# Patient Record
Sex: MALE | Race: WHITE | Employment: OTHER | URBAN - METROPOLITAN AREA
[De-identification: names, ages, dates, MRNs, and addresses within clinical notes are randomized per-mention and may not be internally consistent; named-entity substitution may affect disease eponyms.]

---

## 2017-07-13 DIAGNOSIS — I10 ESSENTIAL HYPERTENSION WITH GOAL BLOOD PRESSURE LESS THAN 140/90: ICD-10-CM

## 2017-07-13 DIAGNOSIS — I48.20 CHRONIC ATRIAL FIBRILLATION (HCC): ICD-10-CM

## 2017-07-17 RX ORDER — LISINOPRIL 5 MG/1
TABLET ORAL
Qty: 30 TAB | Refills: 10 | Status: SHIPPED | OUTPATIENT
Start: 2017-07-17 | End: 2017-11-09

## 2017-07-17 RX ORDER — NEBIVOLOL HYDROCHLORIDE 10 MG/1
TABLET ORAL
Qty: 30 TAB | Refills: 10 | Status: SHIPPED | OUTPATIENT
Start: 2017-07-17 | End: 2018-08-01 | Stop reason: SDUPTHER

## 2017-11-09 ENCOUNTER — OFFICE VISIT (OUTPATIENT)
Dept: FAMILY MEDICINE CLINIC | Age: 77
End: 2017-11-09

## 2017-11-09 VITALS
BODY MASS INDEX: 24 KG/M2 | DIASTOLIC BLOOD PRESSURE: 94 MMHG | SYSTOLIC BLOOD PRESSURE: 160 MMHG | HEART RATE: 54 BPM | WEIGHT: 187 LBS | OXYGEN SATURATION: 97 % | RESPIRATION RATE: 18 BRPM | TEMPERATURE: 97.9 F | HEIGHT: 74 IN

## 2017-11-09 DIAGNOSIS — Z13.39 SCREENING FOR ALCOHOLISM: ICD-10-CM

## 2017-11-09 DIAGNOSIS — R73.01 ELEVATED FASTING GLUCOSE: ICD-10-CM

## 2017-11-09 DIAGNOSIS — Z51.81 MEDICATION MONITORING ENCOUNTER: ICD-10-CM

## 2017-11-09 DIAGNOSIS — Z23 ENCOUNTER FOR IMMUNIZATION: ICD-10-CM

## 2017-11-09 DIAGNOSIS — Z00.00 WELCOME TO MEDICARE PREVENTIVE VISIT: Primary | ICD-10-CM

## 2017-11-09 DIAGNOSIS — I10 ESSENTIAL HYPERTENSION: ICD-10-CM

## 2017-11-09 DIAGNOSIS — I48.20 CHRONIC ATRIAL FIBRILLATION (HCC): ICD-10-CM

## 2017-11-09 RX ORDER — ASPIRIN 81 MG/1
81 TABLET ORAL DAILY
Status: ON HOLD | COMMUNITY
Start: 2017-11-09 | End: 2018-10-01 | Stop reason: CLARIF

## 2017-11-09 NOTE — MR AVS SNAPSHOT
Visit Information Date & Time Provider Department Dept. Phone Encounter #  
 11/9/2017  8:00 AM Ana Maria Prudence, Thee Haines [phone] [id number] Follow-up Instructions Return in about 6 months (around 5/9/2018), or if symptoms worsen or fail to improve. Upcoming Health Maintenance Date Due  
 GLAUCOMA SCREENING Q2Y 3/30/2004 Pneumococcal 65+ Low/Medium Risk (2 of 2 - PPSV23) 7/13/2017 MEDICARE YEARLY EXAM 7/14/2017 Influenza Age 5 to Adult 8/1/2017 DTaP/Tdap/Td series (2 - Td) 11/9/2027 Allergies as of 11/9/2017  Review Complete On: 11/9/2017 By: Ana Maria Foss, DO No Known Allergies Current Immunizations  Reviewed on 7/13/2016 Name Date Influenza Vaccine (Quad) PF  Incomplete Pneumococcal Conjugate (PCV-13) 7/13/2016 Pneumococcal Polysaccharide (PPSV-23)  Incomplete Zoster Vaccine, Live 10/1/2015 Not reviewed this visit You Were Diagnosed With   
  
 Codes Comments Welcome to Medicare preventive visit    -  Primary ICD-10-CM: Z00.00 ICD-9-CM: V70.0 Screening for alcoholism     ICD-10-CM: Z13.89 ICD-9-CM: V79.1 Encounter for immunization     ICD-10-CM: X30 ICD-9-CM: V03.89 Essential hypertension     ICD-10-CM: I10 
ICD-9-CM: 401.9 Medication monitoring encounter     ICD-10-CM: Z51.81 
ICD-9-CM: V58.83 Elevated fasting glucose     ICD-10-CM: R73.01 
ICD-9-CM: 790.21 Chronic atrial fibrillation (HCC)     ICD-10-CM: H85.6 ICD-9-CM: 427.31 Vitals BP Pulse Temp Resp Height(growth percentile) Weight(growth percentile) (!) 160/94 (!) 54 97.9 °F (36.6 °C) (Oral) 18 6' 2\" (1.88 m) 187 lb (84.8 kg) SpO2 BMI Smoking Status 97% 24.01 kg/m2 Never Smoker Vitals History BMI and BSA Data Body Mass Index Body Surface Area 24.01 kg/m 2 2.1 m 2 Preferred Pharmacy Pharmacy Name Phone  [facility] [address] [phone] Your Updated Medication List  
  
   
This list is accurate as of: 11/9/17  8:28 AM.  Always use your most recent med list.  
  
  
  
  
 aspirin delayed-release 81 mg tablet Take 1 Tab by mouth daily. BYSTOLIC 10 mg tablet Generic drug:  nebivolol TAKE ONE TABLET BY MOUTH DAILY COMPLETE SENIOR 0.4-300-250 mg-mcg-mcg Tab Generic drug:  multivit-min-FA-lycopen-lutein Take 1 Tab by mouth daily. We Performed the Following CBC WITH AUTOMATED DIFF [17449 CPT(R)] HEMOGLOBIN A1C WITH EAG [01023 CPT(R)] INFLUENZA VIRUS VAC QUAD,SPLIT,PRESV FREE SYRINGE IM [id number] CPT(R)] METABOLIC PANEL, COMPREHENSIVE [82685 CPT(R)] PNEUMOCOCCAL POLYSACCHARIDE VACCINE, 23-VALENT, ADULT OR IMMUNOSUPPRESSED PT DOSE, [37359 CPT(R)] [place] ANNUAL ALCOHOL SCREEN 15 MIN [id number] [facility]] [place] IMMUNIZ ADMIN,1 SINGLE/COMB VAC/TOXOID [id number] CPT(R)] TSH 3RD GENERATION [83298 CPT(R)] Follow-up Instructions Return in about 6 months (around 5/9/2018), or if symptoms worsen or fail to improve. Patient Instructions Medicare Wellness Visit, Male The best way to live healthy is to have a healthy lifestyle by eating a well-balanced diet, exercising regularly, limiting alcohol and stopping smoking. Regular physical exams and screening tests are another way to keep healthy. Preventive exams provided by your health care provider can find health problems before they become diseases or illnesses. Preventive services including immunizations, screening tests, monitoring and exams can help you take care of your own health. All people over age 72 should have a pneumovax  and and a prevnar shot to prevent pneumonia. These are once in a lifetime unless you and your provider decide differently. All people over 65 should have a yearly flu shot and a tetanus vaccine every 10 years.  
 
Screening for diabetes mellitus with a blood sugar test should be done every year. Glaucoma is a disease of the eye due to increased ocular pressure that can lead to blindness and it should be done every year by an eye professional. 
 
Cardiovascular screening tests that check for elevated lipids (fatty part of blood) which can lead to heart disease and strokes should be done every 5 years. Colorectal screening that evaluates for blood or polyps in your colon should be done yearly as a stool test or every five years as a flexible sigmoidoscope or every 10 years as a colonoscopy up to age 76. Men up to age 76 may need a screening blood test for prostate cancer at certain intervals, depending on their personal and family history. This decision is between the patient and his provider. If you have been a smoker or had family history of abdominal aortic aneurysms, you and your provider may decide to schedule an ultrasound test of your aorta. Hepatitis C screening is also recommended for anyone born between [birth year] through [address]. A shingles vaccine is also recommended once in a lifetime after age 61. Your Medicare Wellness Exam is recommended annually. Here is a list of your current Health Maintenance items with a due date: 
Health Maintenance Due Topic Date Due  Glaucoma Screening   03/30/2004  Pneumococcal Vaccine (2 of 2 - PPSV23) 07/13/2017 [address] [place] [address] Annual Well Visit  07/14/2017  Flu Vaccine  08/01/2017 Introducing [facility]! Jay Jay Zarco introduces [employer] patient portal. Now you can access parts of your medical record, email your doctor's office, and request medication refills online. 1. In your internet browser, go to [email or web address] 2. Click on the First Time User? Click Here link in the Sign In box. You will see the New Member Sign Up page. 3. Enter your [employer] Access Code exactly as it appears below. You will not need to use this code after youve completed the sign-up process.  If you do not sign up before the expiration date, you must request a new code. · [employer] Access Code: [email or web address] Expires: 2/7/2018  8:06 AM 
 
4. Enter the last four digits of your Social Security Number (xxxx) and Date of Birth (mm/dd/yyyy) as indicated and click Submit. You will be taken to the next sign-up page. 5. Create a [employer] ID. This will be your [employer] login ID and cannot be changed, so think of one that is secure and easy to remember. 6. Create a [employer] password. You can change your password at any time. 7. Enter your Password Reset Question and Answer. This can be used at a later time if you forget your password. 8. Enter your e-mail address. You will receive e-mail notification when new information is available in [address] 9. Click Sign Up. You can now view and download portions of your medical record. 10. Click the Download Summary menu link to download a portable copy of your medical information. If you have questions, please visit the Frequently Asked Questions section of the [employer] website. Remember, [employer] is NOT to be used for urgent needs. For medical emergencies, dial 911. Now available from your iPhone and Android! Please provide this summary of care documentation to your next provider. Your primary care clinician is listed as Adilene Thacker. If you have any questions after today's visit, please call [phone].

## 2017-11-09 NOTE — PROGRESS NOTES
Lorna Henderson is a 65 y.o. male   Chief Complaint   Patient presents with    Complete Physical    Pt here for BP check and his medicare wellness visit. Pt states he stopped the lisinopril felt like it was making him weak. Does continue to take the bystolic. [facility]. Pt is otherwise feeling well and no other complaints. [facility]. he is a 65y.o. year old male who presents for evalution. Reviewed PmHx, RxHx, FmHx, SocHx, AllgHx and updated and dated in the chart. Review of Systems - negative except as listed above in the HPI    Objective:     Vitals:    11/09/17 0807   BP: (!) 160/94   Pulse: (!) 54   Resp: 18   Temp: 97.9 °F (36.6 °C)   TempSrc: Oral   SpO2: 97%   Weight: 187 lb (84.8 kg)   Height: 6' 2\" (1.88 m)       Current Outpatient Prescriptions   Medication Sig    aspirin delayed-release 81 mg tablet Take 1 Tab by mouth daily.  BYSTOLIC 10 mg tablet TAKE ONE TABLET BY MOUTH DAILY    multivit-min-FA-lycopen-lutein (COMPLETE SENIOR) 0.4-300-250 mg-mcg-mcg tab Take 1 Tab by mouth daily. No current facility-administered medications for this visit. Physical Examination: General appearance - alert, well appearing, and in no distress  Mental status - alert, oriented to person, place, and time  Chest - clear to auscultation, no wheezes, rales or rhonchi, symmetric air entry  Heart - normal rate, regular rhythm, normal S1, S2, no murmurs, rubs, clicks or gallops, pt pulse feels to be in regular rhythm  Extremities - peripheral pulses normal, no pedal edema, no clubbing or cyanosis  Skin - normal coloration and turgor, no rashes, no suspicious skin lesions noted      Assessment/ Plan:   Diagnoses and all orders for this visit:    1. Welcome to Medicare preventive visit  -     Annual  Alcohol Screen 15 min ()    2. Screening for alcoholism  -     Annual  Alcohol Screen 15 min ()    3. Encounter for immunization  -     Pneumococcal polysaccharide vaccine, 23-valent, adult or immunosuppressed pt dose  -     [place] IMMUNIZ ADMIN,1 SINGLE/COMB VAC/TOXOID  -     Influenza virus vaccine (FLUZONE HIGH-DOSE) 65 years and older    4. Essential hypertension  -     METABOLIC PANEL, COMPREHENSIVE  -     TSH 3RD GENERATION    5. Medication monitoring encounter  -     CBC WITH AUTOMATED DIFF  -     METABOLIC PANEL, COMPREHENSIVE  -     TSH 3RD GENERATION    6. Elevated fasting glucose  -     METABOLIC PANEL, COMPREHENSIVE  -     HEMOGLOBIN A1C WITH EAG    7. Chronic atrial fibrillation (HCC)       Follow-up Disposition:  Return in about 6 months (around 5/9/2018), or if symptoms worsen or fail to improve. I have discussed the diagnosis with the patient and the intended plan as seen in the above orders. The patient has received an after-visit summary and questions were answered concerning future plans. Pt conveyed understanding of plan. Medication Side Effects and Warnings were discussed with patient      Toshia Jose, DO       This is a Subsequent Medicare Annual Wellness Exam (AWV) (Performed 12 months after IPPE or effective date of Medicare Part B enrollment)    I have reviewed the patient's medical history in detail and updated the computerized patient record. History     Past Medical History:   Diagnosis Date    A-fib [facility])     discovered 2008    Hypertension     Pacemaker     placed  Nov 2011 ([address])    Sick sinus syndrome [facility])     heart monitor showed long pauses (> 4 sec) in 2011      Past Surgical History:   Procedure Laterality Date    HX HERNIA REPAIR      HX PACEMAKER       Current Outpatient Prescriptions   Medication Sig Dispense Refill    aspirin delayed-release 81 mg tablet Take 1 Tab by mouth daily.       BYSTOLIC 10 mg tablet TAKE ONE TABLET BY MOUTH DAILY 30 Tab 10    multivit-min-FA-lycopen-lutein (COMPLETE SENIOR) 0.4-300-250 mg-mcg-mcg tab Take 1 Tab by mouth daily. No Known Allergies  Family History   Problem Relation Age of Onset    Diabetes Maternal Aunt      Social History   Substance Use Topics    Smoking status: Never Smoker    Smokeless tobacco: Never Used    Alcohol use No     Patient Active Problem List   Diagnosis Code    Essential hypertension I10    Chronic atrial fibrillation (HCC) I48.2    Pacemaker Z95.0    ACP (advance care planning) Z71.89       Depression Risk Factor Screening:     PHQ over the last two weeks 11/9/2017   Little interest or pleasure in doing things Not at all   Feeling down, depressed or hopeless Not at all   Total Score PHQ 2 0     Alcohol Risk Factor Screening: You do not drink alcohol or very rarely. Functional Ability and Level of Safety:   Hearing Loss  Hearing is good. Activities of Daily Living  The home contains: no safety equipment. Patient does total self care    Fall Risk  Fall Risk Assessment, last 12 mths 11/9/2017   Able to walk? Yes   Fall in past 12 months? No       Abuse Screen  Patient is not abused    Cognitive Screening   Evaluation of Cognitive Function:  Has your family/caregiver stated any concerns about your memory: no  Normal    Patient Care Team   Patient Care Team:  Hair Fields DO as PCP - General (Family Practice)  Xu Dennis MD (Cardiology)  Arnold Rees MD as Surgeon (Cardiothoracic Surgery)    Assessment/Plan   Education and counseling provided:  Are appropriate based on today's review and evaluation  End-of-Life planning (with patient's consent)  Pneumococcal Vaccine  Influenza Vaccine    Diagnoses and all orders for this visit:    1. Welcome to Medicare preventive visit  -     Annual  Alcohol Screen 15 min ()    2. Screening for alcoholism  -     Annual  Alcohol Screen 15 min ()    3.  Encounter for immunization  -     Pneumococcal polysaccharide vaccine, 23-valent, adult or immunosuppressed pt dose  -     [place] IMMUNIZ ADMIN,1 SINGLE/COMB VAC/TOXOID  -     Influenza virus vaccine (FLUZONE HIGH-DOSE) 72 years and older    4. Essential hypertension  -     METABOLIC PANEL, COMPREHENSIVE  -     TSH 3RD GENERATION    5. Medication monitoring encounter  -     CBC WITH AUTOMATED DIFF  -     METABOLIC PANEL, COMPREHENSIVE  -     TSH 3RD GENERATION    6. Elevated fasting glucose  -     METABOLIC PANEL, COMPREHENSIVE  -     HEMOGLOBIN A1C WITH EAG    7. Chronic atrial fibrillation [facility])        Health Maintenance Due   Topic Date Due    GLAUCOMA SCREENING Q2Y  03/30/2004    Pneumococcal 65+ Low/Medium Risk (2 of 2 - PPSV23) 07/13/2017    MEDICARE YEARLY EXAM  07/14/2017    Influenza Age 9 to Adult  08/01/2017     I have discussed the diagnosis with the patient and the intended plan as seen in the above orders. The patient has received an after-visit summary and questions were answered concerning future plans. Pt conveyed understanding of plan.       Dr Melanie Pérez

## 2017-11-09 NOTE — PATIENT INSTRUCTIONS

## 2017-11-10 LAB
ALBUMIN SERPL-MCNC: 4.5 G/DL (ref 3.5–4.8)
ALBUMIN/GLOB SERPL: 1.8 {RATIO} (ref 1.2–2.2)
ALP SERPL-CCNC: 85 IU/L (ref 39–117)
ALT SERPL-CCNC: 22 IU/L (ref 0–44)
AST SERPL-CCNC: 26 IU/L (ref 0–40)
BASOPHILS # BLD AUTO: 0 X10E3/UL (ref 0–0.2)
BASOPHILS NFR BLD AUTO: 0 %
BILIRUB SERPL-MCNC: 1.9 MG/DL (ref 0–1.2)
BUN SERPL-MCNC: 21 MG/DL (ref 8–27)
BUN/CREAT SERPL: 18 (ref 10–24)
CALCIUM SERPL-MCNC: 9.3 MG/DL (ref 8.6–10.2)
CHLORIDE SERPL-SCNC: 101 MMOL/L (ref 96–106)
CO2 SERPL-SCNC: 20 MMOL/L (ref 18–29)
CREAT SERPL-MCNC: 1.15 MG/DL (ref 0.76–1.27)
EOSINOPHIL # BLD AUTO: 0.2 X10E3/UL (ref 0–0.4)
EOSINOPHIL NFR BLD AUTO: 4 %
ERYTHROCYTE [DISTWIDTH] IN BLOOD BY AUTOMATED COUNT: 14.5 % (ref 12.3–15.4)
EST. AVERAGE GLUCOSE BLD GHB EST-MCNC: 126 MG/DL
GFR SERPLBLD CREATININE-BSD FMLA CKD-EPI: 61 ML/MIN/1.73
GFR SERPLBLD CREATININE-BSD FMLA CKD-EPI: 70 ML/MIN/1.73
GLOBULIN SER CALC-MCNC: 2.5 G/DL (ref 1.5–4.5)
GLUCOSE SERPL-MCNC: 119 MG/DL (ref 65–99)
HBA1C MFR BLD: 6 % (ref 4.8–5.6)
HCT VFR BLD AUTO: 44.5 % (ref 37.5–51)
HGB BLD-MCNC: 15.1 G/DL (ref 12.6–17.7)
IMM GRANULOCYTES # BLD: 0 X10E3/UL (ref 0–0.1)
IMM GRANULOCYTES NFR BLD: 0 %
LYMPHOCYTES # BLD AUTO: 1.7 X10E3/UL (ref 0.7–3.1)
LYMPHOCYTES NFR BLD AUTO: 31 %
MCH RBC QN AUTO: 31.6 PG (ref 26.6–33)
MCHC RBC AUTO-ENTMCNC: 33.9 G/DL (ref 31.5–35.7)
MCV RBC AUTO: 93 FL (ref 79–97)
MONOCYTES # BLD AUTO: 0.5 X10E3/UL (ref 0.1–0.9)
MONOCYTES NFR BLD AUTO: 9 %
NEUTROPHILS # BLD AUTO: 3 X10E3/UL (ref 1.4–7)
NEUTROPHILS NFR BLD AUTO: 56 %
PLATELET # BLD AUTO: 174 X10E3/UL (ref 150–379)
POTASSIUM SERPL-SCNC: 4.5 MMOL/L (ref 3.5–5.2)
PROT SERPL-MCNC: 7 G/DL (ref 6–8.5)
RBC # BLD AUTO: 4.78 X10E6/UL (ref 4.14–5.8)
SODIUM SERPL-SCNC: 140 MMOL/L (ref 134–144)
TSH SERPL DL<=0.005 MIU/L-ACNC: 1.85 UIU/ML (ref 0.45–4.5)
WBC # BLD AUTO: 5.3 X10E3/UL (ref 3.4–10.8)

## 2018-04-27 ENCOUNTER — DOCUMENTATION ONLY (OUTPATIENT)
Dept: CARDIOLOGY CLINIC | Age: 78
End: 2018-04-27

## 2018-04-27 NOTE — PROGRESS NOTES
Mailed pt a letter to inform him he is overdue for his pacer check and appt to see Dr Kalpana Dockery. Informed to ask for Candiss Sol to bere both appts together.

## 2018-05-10 ENCOUNTER — TELEPHONE (OUTPATIENT)
Dept: CARDIOLOGY CLINIC | Age: 78
End: 2018-05-10

## 2018-06-29 ENCOUNTER — OFFICE VISIT (OUTPATIENT)
Dept: CARDIOLOGY CLINIC | Age: 78
End: 2018-06-29

## 2018-06-29 ENCOUNTER — CLINICAL SUPPORT (OUTPATIENT)
Dept: CARDIOLOGY CLINIC | Age: 78
End: 2018-06-29

## 2018-06-29 VITALS
OXYGEN SATURATION: 99 % | HEIGHT: 74 IN | SYSTOLIC BLOOD PRESSURE: 140 MMHG | WEIGHT: 185.6 LBS | HEART RATE: 59 BPM | DIASTOLIC BLOOD PRESSURE: 98 MMHG | RESPIRATION RATE: 14 BRPM | BODY MASS INDEX: 23.82 KG/M2

## 2018-06-29 DIAGNOSIS — I48.20 CHRONIC ATRIAL FIBRILLATION (HCC): Primary | ICD-10-CM

## 2018-06-29 DIAGNOSIS — Z95.0 CARDIAC PACEMAKER IN SITU: Primary | ICD-10-CM

## 2018-06-29 NOTE — PROGRESS NOTES
HISTORY OF PRESENTING ILLNESS      Ruth Ann Saucedo is a 77 y.o. 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 Device interrogation reveals normal functioning. Denies chest pain, SOB, syncope, edema. ACTIVE PROBLEM LIST     Patient Active Problem List    Diagnosis Date Noted    ACP (advance care planning) 07/13/2016    Pacemaker 11/02/2015    Essential hypertension 07/20/2015    Chronic atrial fibrillation ([address] 07/20/2015           PAST MEDICAL HISTORY     Past Medical History:   Diagnosis Date    A-fib [facility])     discovered 2008    Hypertension     Pacemaker     placed  Nov 2011 ([address])    Sick sinus syndrome ([address]     heart monitor showed long pauses (> 4 sec) in 2011           PAST SURGICAL HISTORY     Past Surgical History:   Procedure Laterality Date    HX HERNIA REPAIR      HX PACEMAKER            ALLERGIES     No Known Allergies       FAMILY HISTORY     Family History   Problem Relation Age of Onset    Diabetes Maternal Aunt     negative for cardiac disease       SOCIAL HISTORY     Social History     Social History    Marital status:      Spouse name: N/A    Number of children: N/A    Years of education: N/A     Social History Main Topics    Smoking status: Never Smoker    Smokeless tobacco: Never Used    Alcohol use No    Drug use: No    Sexual activity: Not Asked     Other Topics Concern    None     Social History Narrative         MEDICATIONS     Current Outpatient Prescriptions   Medication Sig    aspirin delayed-release 81 mg tablet Take 1 Tab by mouth daily.  BYSTOLIC 10 mg tablet TAKE ONE TABLET BY MOUTH DAILY    multivit-min-FA-lycopen-lutein (COMPLETE SENIOR) 0.4-300-250 mg-mcg-mcg tab Take 1 Tab by mouth daily. No current facility-administered medications for this visit.         I have reviewed the nurses notes, vitals, problem list, allergy list, medical history, family, social history and medications. REVIEW OF SYMPTOMS      General: Pt denies excessive weight gain or loss. Pt is able to conduct ADL's  HEENT: Denies blurred vision, headaches, hearing loss, epistaxis and difficulty swallowing. Respiratory: Denies cough, congestion, shortness of breath, LOMELI, wheezing or stridor. Cardiovascular: Denies precordial pain, palpitations, edema or PND  Gastrointestinal: Denies poor appetite, indigestion, abdominal pain or blood in stool  Genitourinary: Denies hematuria, dysuria, increased urinary frequency  Musculoskeletal: Denies joint pain or swelling from muscles or joints  Neurologic: Denies tremor, paresthesias, headache, or sensory motor disturbance  Psychiatric: Denies confusion, insomnia, depression  Integumentray: Denies rash, itching or ulcers. Hematologic: Denies easy bruising, bleeding       PHYSICAL EXAMINATION      Vitals:    06/29/18 0924   BP: (!) 140/98   Pulse: (!) 59   Resp: 14   SpO2: 99%   Weight: 185 lb 9.6 oz (84.2 kg)   Height: 6' 2\" (1.88 m)     General: Well developed, in no acute distress. HEENT: No jaundice, oral mucosa moist, no oral ulcers  Neck: Supple, no stiffness, no lymphadenopathy, supple  Heart:  Normal S1/S2 negative S3 or S4. Regular, no murmur, gallop or rub, no jugular venous distention  Respiratory: Clear bilaterally x 4, no wheezing or rales  Abdomen:   Soft, non-tender, bowel sounds are active.   Extremities:  No edema, normal cap refill, no cyanosis. Musculoskeletal: No clubbing, no deformities  Neuro: A&Ox3, speech clear, gait stable, cooperative, no focal neurologic deficits  Skin: Skin color is normal. No rashes or lesions.  Non diaphoretic, moist.  Vascular: 2+ pulses symmetric in all extremities       DIAGNOSTIC DATA      EKG:        LABORATORY DATA      Lab Results   Component Value Date/Time    WBC 5.3 11/09/2017 08:24 AM    HGB 15.1 11/09/2017 08:24 AM    HCT 44.5 11/09/2017 08:24 AM PLATELET 295 56/10/7153 08:24 AM    MCV 93 11/09/2017 08:24 AM      Lab Results   Component Value Date/Time    Sodium 140 11/09/2017 08:24 AM    Potassium 4.5 11/09/2017 08:24 AM    Chloride 101 11/09/2017 08:24 AM    CO2 20 11/09/2017 08:24 AM    Glucose 119 (H) 11/09/2017 08:24 AM    BUN 21 11/09/2017 08:24 AM    Creatinine 1.15 11/09/2017 08:24 AM    BUN/Creatinine ratio 18 11/09/2017 08:24 AM    GFR est AA 70 11/09/2017 08:24 AM    GFR est non-AA 61 11/09/2017 08:24 AM    Calcium 9.3 11/09/2017 08:24 AM    Bilirubin, total 1.9 (H) 11/09/2017 08:24 AM    AST (SGOT) 26 11/09/2017 08:24 AM    Alk. phosphatase 85 11/09/2017 08:24 AM    Protein, total 7.0 11/09/2017 08:24 AM    Albumin 4.5 11/09/2017 08:24 AM    A-G Ratio 1.8 11/09/2017 08:24 AM    ALT (SGPT) 22 11/09/2017 08:24 AM           ASSESSMENT      1. Hypertension  2. Atrial fibrillation              A. Long-standing persistent              B. Declined anticoagulation  3. PPM                A. Single chamber              B. [place] [facility]  4. Lead malfunction              A. Insulation breath       PLAN     He wishes to defer WATCHMAN. Continue follow up in device clinic       FOLLOW-UP     1 year      Thank you, Denzel Epperson DO for allowing me to participate in the care of this extraordinarily pleasant male. Please do not hesitate to contact me for further questions/concerns.          Ignacio Zaragoza MD  Cardiac Electrophysiology / Cardiology    [address]  [address] [facility], [facility], [address] [place], [place] [address], [address]    [address]  [phone] / [phone] Fax   [phone] / [phone] Fax

## 2018-06-29 NOTE — PROGRESS NOTES
Chief Complaint   Patient presents with    Follow-up    Pacemaker Check    Irregular Heart Beat     1. Have you been to the ER, urgent care clinic since your last visit? Hospitalized since your last visit? No    2. Have you seen or consulted any other health care providers outside of the [address] [place] [address] since your last visit? Include any pap smears or colon screening.  No    Visit Vitals    BP (!) 140/98 (BP 1 Location: Left arm, BP Patient Position: Sitting)    Pulse (!) 59    Resp 14    Ht 6' 2\" (1.88 m)    Wt 185 lb 9.6 oz (84.2 kg)    SpO2 99%    BMI 23.83 kg/m2

## 2018-07-05 ENCOUNTER — TELEPHONE (OUTPATIENT)
Dept: CARDIOLOGY CLINIC | Age: 78
End: 2018-07-05

## 2018-07-05 NOTE — TELEPHONE ENCOUNTER
Patient has decided to go ahead with the procedure that he & Dr Kemal Stewart discussed. Patient would like a call back to set up.   Phone [phone]  Leyla Boyd

## 2018-07-09 NOTE — TELEPHONE ENCOUNTER
Spoke with patient and spouse. SUSHMA for this Wednesday, July 11th 9:30am arrival.  Reviewed preop over phone with spouse as patient was not available.

## 2018-07-11 ENCOUNTER — HOSPITAL ENCOUNTER (OUTPATIENT)
Dept: NON INVASIVE DIAGNOSTICS | Age: 78
Discharge: HOME | End: 2018-07-11
Attending: INTERNAL MEDICINE | Admitting: INTERNAL MEDICINE
Payer: MEDICARE

## 2018-07-11 VITALS
SYSTOLIC BLOOD PRESSURE: 136 MMHG | TEMPERATURE: 97.8 F | WEIGHT: 182.98 LBS | HEART RATE: 60 BPM | HEIGHT: 74 IN | BODY MASS INDEX: 23.48 KG/M2 | DIASTOLIC BLOOD PRESSURE: 74 MMHG | RESPIRATION RATE: 20 BRPM | OXYGEN SATURATION: 95 %

## 2018-07-11 PROCEDURE — 99152 MOD SED SAME PHYS/QHP 5/>YRS: CPT | Performed by: INTERNAL MEDICINE

## 2018-07-11 PROCEDURE — 74011250636 HC RX REV CODE- 250/636: Performed by: INTERNAL MEDICINE

## 2018-07-11 PROCEDURE — 74011000250 HC RX REV CODE- 250: Performed by: INTERNAL MEDICINE

## 2018-07-11 PROCEDURE — 93312 ECHO TRANSESOPHAGEAL: CPT

## 2018-07-11 RX ORDER — FENTANYL CITRATE 50 UG/ML
25-200 INJECTION, SOLUTION INTRAMUSCULAR; INTRAVENOUS
Status: DISCONTINUED | OUTPATIENT
Start: 2018-07-11 | End: 2018-07-11 | Stop reason: HOSPADM

## 2018-07-11 RX ORDER — SAW PALMETTO 160 MG
160 CAPSULE ORAL 3 TIMES DAILY
COMMUNITY

## 2018-07-11 RX ORDER — LIDOCAINE 50 MG/G
OINTMENT TOPICAL AS NEEDED
Status: DISCONTINUED | OUTPATIENT
Start: 2018-07-11 | End: 2018-07-11 | Stop reason: HOSPADM

## 2018-07-11 RX ORDER — LIDOCAINE HYDROCHLORIDE 20 MG/ML
JELLY TOPICAL ONCE
Status: COMPLETED | OUTPATIENT
Start: 2018-07-11 | End: 2018-07-11

## 2018-07-11 RX ORDER — LIDOCAINE HYDROCHLORIDE 20 MG/ML
15 SOLUTION OROPHARYNGEAL AS NEEDED
Status: DISCONTINUED | OUTPATIENT
Start: 2018-07-11 | End: 2018-07-11 | Stop reason: HOSPADM

## 2018-07-11 RX ORDER — DIPHENHYDRAMINE HYDROCHLORIDE 50 MG/ML
25-50 INJECTION, SOLUTION INTRAMUSCULAR; INTRAVENOUS ONCE
Status: COMPLETED | OUTPATIENT
Start: 2018-07-11 | End: 2018-07-11

## 2018-07-11 RX ORDER — MIDAZOLAM HYDROCHLORIDE 1 MG/ML
.5-1 INJECTION, SOLUTION INTRAMUSCULAR; INTRAVENOUS
Status: DISCONTINUED | OUTPATIENT
Start: 2018-07-11 | End: 2018-07-11 | Stop reason: HOSPADM

## 2018-07-11 RX ADMIN — FENTANYL CITRATE 25 MCG: 50 INJECTION, SOLUTION INTRAMUSCULAR; INTRAVENOUS at 12:22

## 2018-07-11 RX ADMIN — LIDOCAINE 1 EACH: 50 OINTMENT TOPICAL at 12:16

## 2018-07-11 RX ADMIN — LIDOCAINE HYDROCHLORIDE 3 ML: 20 JELLY TOPICAL at 12:15

## 2018-07-11 RX ADMIN — LIDOCAINE HYDROCHLORIDE 15 ML: 20 SOLUTION ORAL; TOPICAL at 12:16

## 2018-07-11 RX ADMIN — MIDAZOLAM HYDROCHLORIDE 1 MG: 1 INJECTION, SOLUTION INTRAMUSCULAR; INTRAVENOUS at 12:22

## 2018-07-11 RX ADMIN — DIPHENHYDRAMINE HYDROCHLORIDE 25 MG: 50 INJECTION, SOLUTION INTRAMUSCULAR; INTRAVENOUS at 12:15

## 2018-07-11 RX ADMIN — FENTANYL CITRATE 25 MCG: 50 INJECTION, SOLUTION INTRAMUSCULAR; INTRAVENOUS at 12:28

## 2018-07-11 NOTE — IP AVS SNAPSHOT
[address] [place] [address] 104 [address] [place] [phone] Patient: Diana Saldana MRN: [id number] HZN:3/[birth year]86 About your hospitalization You were admitted on:  July 11, 2018 You last received care in the:  [facility] ELECTROPHYSIOLOGY You were discharged on:  July 11, 2018 Why you were hospitalized Your primary diagnosis was:  Not on File Follow-up Information None Your Scheduled Appointments Thursday July 26, 2018  9:15 AM EDT  
EP 75 with CATH EP ROOM [facility]  
[facility] ELECTROPHYSIOLOGY ([address]) [facility]  
[phone] NPO AFTER MIDNIGHT! ROUTINE CASES:  Please arrive 2 hour prior to your scheduled appointment time. If your scheduled appointment is for 0730, 0800, 0815, please arrive by 0645. NON ROUTINE CASES:  PATIENTS WHO REQUIRE LABS, X-RAY, EKG, or MEDS:  PLEASE ARRIVE 3 HOURS PRIOR TO YOUR SCHEDULED APPOINTMENT. If you require hydration prior to your procedure, PLEASE ARRIVE 4 HOURS PRIOR TO YOUR APPOINTMENT  **** IT IS THE OFFICE SCHEDULARS RESPONSBILITY TO NOTIFY THE CATH LAB SCHEDULAR IF THE PATIENT WILL REQUIRE ANY ADDITIONAL TIME FOR PREP FROM ROUTINE CASE ***** Please report to [facility], 2nd Floor. Please report to the Main Registration area for [address] [place] [address]. Use the [address] [place] [address] entrance to the main hospital.  Enter [facility] and take gold elevators to the second floor. Sign in at [employer]. Discharge Orders None A check abida indicates which time of day the medication should be taken. My Medications ASK your doctor about these medications Instructions Each Dose to Equal  
 Morning Noon Evening Bedtime  
 aspirin delayed-release 81 mg tablet Your last dose was: Your next dose is: Take 1 Tab by mouth daily.   
 81 mg  
    
   
   
   
  
 BYSTOLIC 10 mg tablet Generic drug:  nebivolol Your last dose was: Your next dose is: TAKE ONE TABLET BY MOUTH DAILY COMPLETE SENIOR 0.4-300-250 mg-mcg-mcg Tab Generic drug:  multivit-min-FA-lycopen-lutein Your last dose was: Your next dose is: Take 1 Tab by mouth daily. 1 Tab  
    
   
   
   
  
 saw palmetto 160 mg Cap Your last dose was: Your next dose is: Take 160 mg by mouth three (3) times daily. 160 mg Discharge Instructions Chadd Gill YOU TRANSESOPHAGEAL ECHOCARDIOGRAM 
 
Be sure someone else drives you home. You may feel drowsy for several hours. Do not eat or drink for at least two hours after your procedure. Your throat will be numb and there is a risk you might have difficulty swallowing for a while. Be careful when you do eat or drink for the first time especially with hot fluids since you could easily burn your throat. Call your doctor if: 
 
· You are bleeding from your throat or mouth. · You have trouble breathing all of a sudden. · You have chest pain or any pain that spreads to your neck, jaw, or arms. · You have questions or concerns. · You have a fever greater than 101°F. 
 
Doctor: Candice Spine Special Instructions: 
 
No driving for 24 hours. *** Introducing Kenny Carrionmary anne As a Jeni Martínez patient, I wanted to make you aware of our electronic visit tool called Kenny Byrd. [facility] Mart[facility]nez 24/7 allows you to connect within minutes with a medical provider 24 hours a day, seven days a week via a mobile device or tablet or logging into a secure website from your computer. You can access Kenny Byrd from anywhere in the [place].  
 
A virtual visit might be right for you when you have a simple condition and feel like you just dont want to get out of bed, or cant get away from work for an appointment, when your regular [facility] provider is not available (evenings, weekends or holidays), or when youre out of town and need minor care. Electronic visits cost only $49 and if the [facility] 24/7 provider determines a prescription is needed to treat your condition, one can be electronically transmitted to a nearby pharmacy*. Please take a moment to enroll today if you have not already done so. The enrollment process is free and takes just a few minutes. To enroll, please download the [employer] sully to your tablet or phone, or visit [email or web address] org to enroll on your computer. And, as an [address] [place] [address] patient with a Leiyoo account, the results of your visits will be scanned into your electronic medical record and your primary care provider will be able to view the scanned results. We urge you to continue to see your regular [facility] provider for your ongoing medical care. And while your primary care provider may not be the one available when you seek a Passadomarnifin virtual visit, the peace of mind you get from getting a real diagnosis real time can be priceless. For more information on MatchMate.Me, view our Frequently Asked Questions (FAQs) at [email or web address]. Sincerely, 
 
Trena Venegas MD 
Chief Medical Officer Esthela Nalini Franklin *:  certain medications cannot be prescribed via MatchMate[employer]Me Providers Seen During Your Hospitalization Provider Specialty Primary office phone Nick Ivy MD Cardiology [phone] Your Primary Care Physician (PCP) Primary Care Physician Office Phone Office Fax Guera Scriver [phone] You are allergic to the following No active allergies Recent Documentation Height Weight BMI Smoking Status 1.88 m 83 kg 23.49 kg/m2 Never Smoker Emergency Contacts Name Discharge Info Relation Home Work Mobile Nata Bishop DISCHARGE CAREGIVER [3] Spouse [3] [phone] Patient Belongings The following personal items are in your possession at time of discharge: 
     Visual Aid: Glasses, At bedside Please provide this summary of care documentation to your next provider. Signatures-by signing, you are acknowledging that this After Visit Summary has been reviewed with you and you have received a copy. Patient Signature:  ____________________________________________________________ Date:  ____________________________________________________________  
  
[facility] Signature:  ____________________________________________________________ Date:  ____________________________________________________________

## 2018-07-11 NOTE — H&P
HISTORY OF PRESENTING ILLNESS       Zuhair Shipman is a 77 y.o. 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 Device interrogation reveals normal functioning. Denies chest pain, SOB, syncope, edema.          ACTIVE PROBLEM LIST           Patient Active Problem List     Diagnosis Date Noted    ACP (advance care planning) 07/13/2016    Pacemaker 11/02/2015    Essential hypertension 07/20/2015    Chronic atrial fibrillation ([address] 07/20/2015             PAST MEDICAL HISTORY           Past Medical History:   Diagnosis Date    A-fib [facility])       discovered 2008    Hypertension      Pacemaker       placed  Nov 2011 ([employer])    Sick sinus syndrome [facility])       heart monitor showed long pauses (> 4 sec) in 2011             PAST SURGICAL HISTORY            Past Surgical History:   Procedure Laterality Date    HX HERNIA REPAIR        HX PACEMAKER                 ALLERGIES      No Known Allergies         FAMILY HISTORY            Family History   Problem Relation Age of Onset    Diabetes Maternal Aunt      negative for cardiac disease         SOCIAL HISTORY       Social History                Social History    Marital status:        Spouse name: N/A    Number of children: N/A    Years of education: N/A           Social History Main Topics    Smoking status: Never Smoker    Smokeless tobacco: Never Used    Alcohol use No    Drug use: No    Sexual activity: Not Asked           Other Topics Concern    None      Social History Narrative               MEDICATIONS           Current Outpatient Prescriptions   Medication Sig    aspirin delayed-release 81 mg tablet Take 1 Tab by mouth daily.     BYSTOLIC 10 mg tablet TAKE ONE TABLET BY MOUTH DAILY    multivit-min-FA-lycopen-lutein (COMPLETE SENIOR) 0.4-300-250 mg-mcg-mcg tab Take 1 Tab by mouth daily.      No current facility-administered medications for this visit.          I have reviewed the nurses notes, vitals, problem list, allergy list, medical history, family, social history and medications.         REVIEW OF SYMPTOMS       General: Pt denies excessive weight gain or loss. Pt is able to conduct ADL's  HEENT: Denies blurred vision, headaches, hearing loss, epistaxis and difficulty swallowing. Respiratory: Denies cough, congestion, shortness of breath, LOMELI, wheezing or stridor. Cardiovascular: Denies precordial pain, palpitations, edema or PND  Gastrointestinal: Denies poor appetite, indigestion, abdominal pain or blood in stool  Genitourinary: Denies hematuria, dysuria, increased urinary frequency  Musculoskeletal: Denies joint pain or swelling from muscles or joints  Neurologic: Denies tremor, paresthesias, headache, or sensory motor disturbance  Psychiatric: Denies confusion, insomnia, depression  Integumentray: Denies rash, itching or ulcers. Hematologic: Denies easy bruising, bleeding         PHYSICAL EXAMINATION           Vitals:     06/29/18 0924   BP: (!) 140/98   Pulse: (!) 59   Resp: 14   SpO2: 99%   Weight: 185 lb 9.6 oz (84.2 kg)   Height: 6' 2\" (1.88 m)      General: Well developed, in no acute distress. HEENT: No jaundice, oral mucosa moist, no oral ulcers  Neck: Supple, no stiffness, no lymphadenopathy, supple  Heart:  Normal S1/S2 negative S3 or S4. Regular, no murmur, gallop or rub, no jugular venous distention  Respiratory: Clear bilaterally x 4, no wheezing or rales  Abdomen:   Soft, non-tender, bowel sounds are active.   Extremities:  No edema, normal cap refill, no cyanosis. Musculoskeletal: No clubbing, no deformities  Neuro: A&Ox3, speech clear, gait stable, cooperative, no focal neurologic deficits  Skin: Skin color is normal. No rashes or lesions.  Non diaphoretic, moist.  Vascular: 2+ pulses symmetric in all extremities         DIAGNOSTIC DATA       EKG:          LABORATORY DATA      Lab Results   Component Value Date/Time     WBC 5.3 11/09/2017 08:24 AM     HGB 15.1 11/09/2017 08:24 AM     HCT 44.5 11/09/2017 08:24 AM     PLATELET 764 14/05/6370 08:24 AM     MCV 93 11/09/2017 08:24 AM            Lab Results   Component Value Date/Time     Sodium 140 11/09/2017 08:24 AM     Potassium 4.5 11/09/2017 08:24 AM     Chloride 101 11/09/2017 08:24 AM     CO2 20 11/09/2017 08:24 AM     Glucose 119 (H) 11/09/2017 08:24 AM     BUN 21 11/09/2017 08:24 AM     Creatinine 1.15 11/09/2017 08:24 AM     BUN/Creatinine ratio 18 11/09/2017 08:24 AM     GFR est AA 70 11/09/2017 08:24 AM     GFR est non-AA 61 11/09/2017 08:24 AM     Calcium 9.3 11/09/2017 08:24 AM     Bilirubin, total 1.9 (H) 11/09/2017 08:24 AM     AST (SGOT) 26 11/09/2017 08:24 AM     Alk. phosphatase 85 11/09/2017 08:24 AM     Protein, total 7.0 11/09/2017 08:24 AM     Albumin 4.5 11/09/2017 08:24 AM     A-G Ratio 1.8 11/09/2017 08:24 AM     ALT (SGPT) 22 11/09/2017 08:24 AM             ASSESSMENT       1. Hypertension  2. Atrial fibrillation              A. Long-standing persistent              B. Declined anticoagulation  3. PPM                A. Single chamber              B. Black Sci  4. Lead malfunction              A.  Insulation breath         PLAN      SUSHMA for watchman evaluation.     Frederick Cook MD  Cardiac Electrophysiology / Cardiology     [address], [address], [address]  [place], [address] [place] [address]  [phone] / [phone] Fax                                                                  [phone] / [phone] Fax

## 2018-07-11 NOTE — DISCHARGE INSTRUCTIONS
Sridevi Gill YOU TRANSESOPHAGEAL ECHOCARDIOGRAM    Be sure someone else drives you home. You may feel drowsy for several hours. Do not eat or drink for at least two hours after your procedure. Your throat will be numb and there is a risk you might have difficulty swallowing for a while. Be careful when you do eat or drink for the first time especially with hot fluids since you could easily burn your throat. Call your doctor if:    · You are bleeding from your throat or mouth. · You have trouble breathing all of a sudden. · You have chest pain or any pain that spreads to your neck, jaw, or arms. · You have questions or concerns. · You have a fever greater than 101°F.    Doctor: ***    Special Instructions:    No driving for 24 hours.   ***

## 2018-07-11 NOTE — PROCEDURES
Cardiac Electrophysiology Report        PATIENT INFORMATION     Patient Name: Nandini Jaime  MRN: [id number]                  Study Date: 2018    YOB: 1940   Age: 77 y.o. Gender: male      Procedure:  Transesophageal EchocardiogramRa    Referring Physician:  Rayo Pedersen DO        STAFF     Duty Name   Electrophysiologist Jaimee Sidhu MD   Monitor Ashlee Beasley RN   Circulator Andres Lin RN; Babita Lima RN       PATIENT HISTORY     Jose Bishop is a 77 y. o. 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 PROCEDURE     The patient was brought to the Cardiac Electrophysiology laboratory in a post-absorptive, fasting state. Informed consent was obtained. A peripheral IV was in place. Continuous electrocardiographic, blood pressure, O2 saturation and  CO2 monitoring was initiated. Once conscious sedation was achieved, a multi-planar lubricated SUSHMA probe was advanced to the oropharynx and into the esophagus without difficulty. Limited views were obtained visualizing the left atrium and left atrial appendage. The emptying velocity of the LA appendage was 50 msec. The left atrial appendage was visualized in multiple views and demonstrated the LA appendage was free of visualized thrombus. The SUSHMA probe was then removed without difficulty. The patient remained hemodynamically stable, tolerated the procedure well and was transferred in stable condition. There were no immediate complications encountered during the procedure. MEDICATION SUMMARY     See anesthesia report      CONCLUSIONS     1. Limited SUSHMA demonstrating the left atrium and left atrial appendage were free of visualized thrombus  2. Will review images to determine whether candidate for TAYLOR.             Rachel Mary Laine Antunez MD  Cardiac Electrophysiology / Cardiology    [address], [address] [facility], [address]  [address]  [phone] / [phone] Fax [phone] / [phone] Fax

## 2018-07-11 NOTE — IP AVS SNAPSHOT
[address] 
[phone] Patient: Ash Melton MRN: [id number] AIV:3/15/6404 A check abida indicates which time of day the medication should be taken. My Medications ASK your doctor about these medications Instructions Each Dose to Equal  
 Morning Noon Evening Bedtime  
 aspirin delayed-release 81 mg tablet Your last dose was: Your next dose is: Take 1 Tab by mouth daily. 81 mg  
    
   
   
   
  
 BYSTOLIC 10 mg tablet Generic drug:  nebivolol Your last dose was: Your next dose is: TAKE ONE TABLET BY MOUTH DAILY COMPLETE SENIOR 0.4-300-250 mg-mcg-mcg Tab Generic drug:  multivit-min-FA-lycopen-lutein Your last dose was: Your next dose is: Take 1 Tab by mouth daily. 1 Tab  
    
   
   
   
  
 saw palmetto 160 mg Cap Your last dose was: Your next dose is: Take 160 mg by mouth three (3) times daily.   
 160 mg

## 2018-07-11 NOTE — PROGRESS NOTES
Patient arrived. ID and allergies verified verbally with patient. Pt voices understanding of procedure to be performed. Consent obtained. Pt prepped for procedure. TRANSFER - OUT REPORT:    Verbal report given to CARLA Lopez(name) on [facility]  being transferred to (unit) for routine progression of care       Report consisted of patients Situation, Background, Assessment and   Recommendations(SBAR). Information from the following report(s) Procedure Summary was reviewed with the receiving nurse. Lines:   Peripheral IV 07/11/18 Right Forearm (Active)        Opportunity for questions and clarification was provided. Patient transported with:   Registered Nurse  TRANSFER - IN REPORT:    Verbal report received from CARLA Pandya(name) on [facility]  being received from (unit) for routine progression of care      Report consisted of patients Situation, Background, Assessment and   Recommendations(SBAR). Information from the following report(s) Procedure Summary was reviewed with the receiving nurse. Opportunity for questions and clarification was provided. Assessment completed upon patients arrival to unit and care assumed. Discharge instructions reviewed with patient and family. Voiced understanding. Patient given copy of discharge instructions to take home.

## 2018-07-16 ENCOUNTER — TELEPHONE (OUTPATIENT)
Dept: CARDIOLOGY CLINIC | Age: 78
End: 2018-07-16

## 2018-07-16 ENCOUNTER — DOCUMENTATION ONLY (OUTPATIENT)
Dept: CARDIOLOGY CLINIC | Age: 78
End: 2018-07-16

## 2018-07-16 PROBLEM — Z53.09 MEDICAL CONTRAINDICATION TO ANTICOAGULANT MEDICATION: Status: ACTIVE | Noted: 2018-07-16

## 2018-07-16 PROBLEM — Z53.20 ANTICOAGULANT DRUG DECLINED: Status: ACTIVE | Noted: 2018-07-16

## 2018-07-16 PROBLEM — Z53.09 MEDICAL CONTRAINDICATION TO ANTICOAGULANT MEDICATION: Status: RESOLVED | Noted: 2018-07-16 | Resolved: 2018-07-16

## 2018-07-16 NOTE — TELEPHONE ENCOUNTER
Pt daughter Seven Chapin called with questions regarding her fathers upcoming procedure. (Per Tanisha Nurse,  Her father recently updated his PHI this morning). Pt daughter can be reached at #[phone].   Thanks

## 2018-07-16 NOTE — TELEPHONE ENCOUNTER
Pt's daughter following up on her call from earlier this afternoon. She states that her father is confused and she asked that you give her a call to explain the pt's procedure and pre op appointment that's scheduled for tomorrow. She can be reached @ [phone].     Thanks

## 2018-07-16 NOTE — TELEPHONE ENCOUNTER
Spoke with patient's daughter. Informed daughter, Juan Hillman, that patient has [employer], not traditional Medicare as initially reported. A prior authorization is needed prior to proceeding with Watchman device implant. Will cancel preop testing scheduled for tomorrow at [address] until authorization for procedure has been obtained. Pending note requirements to be completed by Dr. Magen Layton prior to initiating prior authorization. Will keep Watchman implant date scheduled for 7/26/18 for now.

## 2018-07-17 NOTE — PROGRESS NOTES
Mr. Reny Gutierrez underwent SUSHMA for evaluation of Watchman device. SUSHMA was performed and failed to demonstrate thrombus. The patient has a CHADSVASC 3. He has been non compliant with previous anticoagulation due to bleeding with aspirin therapy. The patient feels strongly that anticoagulation and documented stroke risk greatly impacts his quality of life. The patient is a candidate for Watchman, a left atrial appendage closure (LAAC) device to reduce risk of thromboembolism. The patient has need for anticoagulation and is considered a high risk for stroke, but has a relative contraindication for long term anticoagulation. The patient is suitable for short term warfarin but deemed unable to take long term oral anticoagulation following the conclusion of shared decision making interaction with the patient. I have discussed at length the risks/benefits and alternatives of this procedure with regards to stroke risk versus bleeding risk. The patient understands that anticoagulation will be maintained in a variety of forms during the first year and agrees with plan. Risks include but not limited to: infection, bleeding, vessel injury, cardiac perforation at times requiring drainage or surgery, heart failure, migration of the device, emergency surgery, myocardial infarction, stroke and death. Will plan for Watchman procedure for July 26, 2018.        Juliann Cheung MD

## 2018-08-01 ENCOUNTER — TELEPHONE (OUTPATIENT)
Dept: CARDIOLOGY CLINIC | Age: 78
End: 2018-08-01

## 2018-08-01 ENCOUNTER — DOCUMENTATION ONLY (OUTPATIENT)
Dept: CARDIOLOGY CLINIC | Age: 78
End: 2018-08-01

## 2018-08-01 NOTE — PROGRESS NOTES
Prior authorization for Watchman Implant: [id number]  Prior authorization for SUSHMA: [id number]  Good 30 days from August 16th.

## 2018-08-01 NOTE — TELEPHONE ENCOUNTER
Patients wife called to speak with someone regarding the pre op appointment.  She asked that you speak with there patient for this   Phone: [phone]

## 2018-08-06 ENCOUNTER — HOSPITAL ENCOUNTER (OUTPATIENT)
Dept: GENERAL RADIOLOGY | Age: 78
Discharge: HOME | End: 2018-08-06
Attending: INTERNAL MEDICINE
Payer: MEDICARE

## 2018-08-06 ENCOUNTER — HOSPITAL ENCOUNTER (OUTPATIENT)
Dept: PREADMISSION TESTING | Age: 78
Discharge: HOME | End: 2018-08-06
Attending: INTERNAL MEDICINE
Payer: MEDICARE

## 2018-08-06 VITALS
DIASTOLIC BLOOD PRESSURE: 91 MMHG | SYSTOLIC BLOOD PRESSURE: 176 MMHG | RESPIRATION RATE: 18 BRPM | HEART RATE: 59 BPM | TEMPERATURE: 97.6 F | BODY MASS INDEX: 23.54 KG/M2 | HEIGHT: 74 IN | OXYGEN SATURATION: 100 % | WEIGHT: 183.42 LBS

## 2018-08-06 LAB
ANION GAP SERPL CALC-SCNC: 9 MMOL/L (ref 5–15)
APPEARANCE UR: CLEAR
ATRIAL RATE: 197 BPM
BACTERIA URNS QL MICRO: NEGATIVE /HPF
BILIRUB UR QL: NEGATIVE
BUN SERPL-MCNC: 26 MG/DL (ref 6–20)
BUN/CREAT SERPL: 21 (ref 12–20)
CALCIUM SERPL-MCNC: 8.9 MG/DL (ref 8.5–10.1)
CALCULATED R AXIS, ECG10: -93 DEGREES
CALCULATED T AXIS, ECG11: 56 DEGREES
CHLORIDE SERPL-SCNC: 101 MMOL/L (ref 97–108)
CO2 SERPL-SCNC: 27 MMOL/L (ref 21–32)
COLOR UR: ABNORMAL
CREAT SERPL-MCNC: 1.26 MG/DL (ref 0.7–1.3)
DIAGNOSIS, 93000: NORMAL
EPITH CASTS URNS QL MICRO: ABNORMAL /LPF
ERYTHROCYTE [DISTWIDTH] IN BLOOD BY AUTOMATED COUNT: 13.2 % (ref 11.5–14.5)
GLUCOSE SERPL-MCNC: 183 MG/DL (ref 65–100)
GLUCOSE UR STRIP.AUTO-MCNC: NEGATIVE MG/DL
HCT VFR BLD AUTO: 45.1 % (ref 36.6–50.3)
HGB BLD-MCNC: 15.2 G/DL (ref 12.1–17)
HGB UR QL STRIP: NEGATIVE
KETONES UR QL STRIP.AUTO: NEGATIVE MG/DL
LEUKOCYTE ESTERASE UR QL STRIP.AUTO: ABNORMAL
MCH RBC QN AUTO: 32.1 PG (ref 26–34)
MCHC RBC AUTO-ENTMCNC: 33.7 G/DL (ref 30–36.5)
MCV RBC AUTO: 95.1 FL (ref 80–99)
NITRITE UR QL STRIP.AUTO: NEGATIVE
NRBC # BLD: 0 K/UL (ref 0–0.01)
NRBC BLD-RTO: 0 PER 100 WBC
PH UR STRIP: 5.5 [PH] (ref 5–8)
PLATELET # BLD AUTO: 168 K/UL (ref 150–400)
PMV BLD AUTO: 10.9 FL (ref 8.9–12.9)
POTASSIUM SERPL-SCNC: 3.9 MMOL/L (ref 3.5–5.1)
PROT UR STRIP-MCNC: NEGATIVE MG/DL
Q-T INTERVAL, ECG07: 470 MS
QRS DURATION, ECG06: 170 MS
QTC CALCULATION (BEZET), ECG08: 470 MS
RBC # BLD AUTO: 4.74 M/UL (ref 4.1–5.7)
RBC #/AREA URNS HPF: ABNORMAL /HPF (ref 0–5)
SODIUM SERPL-SCNC: 137 MMOL/L (ref 136–145)
SP GR UR REFRACTOMETRY: 1.02 (ref 1–1.03)
UA: UC IF INDICATED,UAUC: ABNORMAL
UROBILINOGEN UR QL STRIP.AUTO: 0.2 EU/DL (ref 0.2–1)
VENTRICULAR RATE, ECG03: 60 BPM
WBC # BLD AUTO: 5.6 K/UL (ref 4.1–11.1)
WBC URNS QL MICRO: ABNORMAL /HPF (ref 0–4)

## 2018-08-06 PROCEDURE — 85027 COMPLETE CBC AUTOMATED: CPT | Performed by: INTERNAL MEDICINE

## 2018-08-06 PROCEDURE — 80048 BASIC METABOLIC PNL TOTAL CA: CPT | Performed by: INTERNAL MEDICINE

## 2018-08-06 PROCEDURE — 81001 URINALYSIS AUTO W/SCOPE: CPT | Performed by: INTERNAL MEDICINE

## 2018-08-06 PROCEDURE — 71046 X-RAY EXAM CHEST 2 VIEWS: CPT

## 2018-08-06 PROCEDURE — 36415 COLL VENOUS BLD VENIPUNCTURE: CPT | Performed by: INTERNAL MEDICINE

## 2018-08-06 PROCEDURE — 93005 ELECTROCARDIOGRAM TRACING: CPT

## 2018-08-06 NOTE — ADVANCED PRACTICE NURSE
Lovely Crane, ALEIDA, watchman coordinator advised of pt's abnormal results. States she will forward results to Dr. Ramana Mo for review.

## 2018-08-07 ENCOUNTER — TELEPHONE (OUTPATIENT)
Dept: CARDIOLOGY CLINIC | Age: 78
End: 2018-08-07

## 2018-08-07 LAB
BACTERIA SPEC CULT: NORMAL
BACTERIA SPEC CULT: NORMAL
SERVICE CMNT-IMP: NORMAL

## 2018-08-07 NOTE — TELEPHONE ENCOUNTER
Omie Apley from [facility] called to confirm that Dr. Maykel Prasad received patient Chest xray done on August 6th.   Phone : [phone]  Thanks

## 2018-08-13 ENCOUNTER — TELEPHONE (OUTPATIENT)
Dept: CARDIOLOGY CLINIC | Age: 78
End: 2018-08-13

## 2018-08-13 RX ORDER — SODIUM CHLORIDE 0.9 % (FLUSH) 0.9 %
5-10 SYRINGE (ML) INJECTION AS NEEDED
Status: CANCELLED | OUTPATIENT
Start: 2018-08-16

## 2018-08-13 RX ORDER — SODIUM CHLORIDE 0.9 % (FLUSH) 0.9 %
5-10 SYRINGE (ML) INJECTION EVERY 8 HOURS
Status: CANCELLED | OUTPATIENT
Start: 2018-08-16

## 2018-08-13 NOTE — TELEPHONE ENCOUNTER
Pt is supposed to have surgery with Dr. Trish Rosales on 8/16/18 and is not sure where to go.    Phone: [phone]

## 2018-08-16 ENCOUNTER — HOSPITAL ENCOUNTER (INPATIENT)
Dept: NON INVASIVE DIAGNOSTICS | Age: 78
LOS: 1 days | Discharge: HOME | DRG: 274 | End: 2018-08-17
Attending: INTERNAL MEDICINE | Admitting: INTERNAL MEDICINE
Payer: MEDICARE

## 2018-08-16 ENCOUNTER — ANESTHESIA EVENT (OUTPATIENT)
Dept: NON INVASIVE DIAGNOSTICS | Age: 78
DRG: 274 | End: 2018-08-16
Payer: MEDICARE

## 2018-08-16 ENCOUNTER — ANESTHESIA (OUTPATIENT)
Dept: NON INVASIVE DIAGNOSTICS | Age: 78
DRG: 274 | End: 2018-08-16
Payer: MEDICARE

## 2018-08-16 PROBLEM — I48.91 ATRIAL FIBRILLATION (HCC): Status: ACTIVE | Noted: 2018-08-16

## 2018-08-16 LAB
ABO + RH BLD: NORMAL
BLOOD GROUP ANTIBODIES SERPL: NORMAL
SPECIMEN EXP DATE BLD: NORMAL

## 2018-08-16 PROCEDURE — 36415 COLL VENOUS BLD VENIPUNCTURE: CPT | Performed by: INTERNAL MEDICINE

## 2018-08-16 PROCEDURE — 85347 COAGULATION TIME ACTIVATED: CPT

## 2018-08-16 PROCEDURE — 76210000016 HC OR PH I REC 1 TO 1.5 HR

## 2018-08-16 PROCEDURE — 33340 PERQ CLSR TCAT L ATR APNDGE: CPT

## 2018-08-16 PROCEDURE — 77030018729 HC ELECTRD DEFIB PAD CARD -B

## 2018-08-16 PROCEDURE — 76060000035 HC ANESTHESIA 2 TO 2.5 HR

## 2018-08-16 PROCEDURE — 36620 INSERTION CATHETER ARTERY: CPT

## 2018-08-16 PROCEDURE — 03HY32Z INSERTION OF MONITORING DEVICE INTO UPPER ARTERY, PERCUTANEOUS APPROACH: ICD-10-PCS | Performed by: ANESTHESIOLOGY

## 2018-08-16 PROCEDURE — 77030026438 HC STYL ET INTUB CARD -A: Performed by: ANESTHESIOLOGY

## 2018-08-16 PROCEDURE — 93462 L HRT CATH TRNSPTL PUNCTURE: CPT

## 2018-08-16 PROCEDURE — 77030008684 HC TU ET CUF COVD -B: Performed by: ANESTHESIOLOGY

## 2018-08-16 PROCEDURE — 02L73DK OCCLUSION OF LEFT ATRIAL APPENDAGE WITH INTRALUMINAL DEVICE, PERCUTANEOUS APPROACH: ICD-10-PCS | Performed by: INTERNAL MEDICINE

## 2018-08-16 PROCEDURE — 77030013797 HC KT TRNSDUC PRSSR EDWD -A

## 2018-08-16 PROCEDURE — 77030020506 HC NDL TRNSPTL NRG BAYL -F

## 2018-08-16 PROCEDURE — C1894 INTRO/SHEATH, NON-LASER: HCPCS

## 2018-08-16 PROCEDURE — 5A2204Z RESTORATION OF CARDIAC RHYTHM, SINGLE: ICD-10-PCS | Performed by: INTERNAL MEDICINE

## 2018-08-16 PROCEDURE — 74011250636 HC RX REV CODE- 250/636

## 2018-08-16 PROCEDURE — 77030029065 HC DRSG HEMO QCLOT ZMED -B

## 2018-08-16 PROCEDURE — 74011250637 HC RX REV CODE- 250/637: Performed by: INTERNAL MEDICINE

## 2018-08-16 PROCEDURE — 77030008543 HC TBNG MON PRSS MRTM -A

## 2018-08-16 PROCEDURE — 74011250636 HC RX REV CODE- 250/636: Performed by: INTERNAL MEDICINE

## 2018-08-16 PROCEDURE — 77030038111 HC IMPL LAA WATCHMAN 27MM BSC -L

## 2018-08-16 PROCEDURE — 93312 ECHO TRANSESOPHAGEAL: CPT

## 2018-08-16 PROCEDURE — 65660000000 HC RM CCU STEPDOWN

## 2018-08-16 PROCEDURE — 77030016707 HC CATH ANGI DX SUPT1 CARD -B

## 2018-08-16 PROCEDURE — 86900 BLOOD TYPING SEROLOGIC ABO: CPT | Performed by: INTERNAL MEDICINE

## 2018-08-16 PROCEDURE — B246ZZ4 ULTRASONOGRAPHY OF RIGHT AND LEFT HEART, TRANSESOPHAGEAL: ICD-10-PCS | Performed by: ANESTHESIOLOGY

## 2018-08-16 PROCEDURE — C1769 GUIDE WIRE: HCPCS

## 2018-08-16 PROCEDURE — 74011000250 HC RX REV CODE- 250

## 2018-08-16 PROCEDURE — 74011636320 HC RX REV CODE- 636/320

## 2018-08-16 RX ORDER — SUCCINYLCHOLINE CHLORIDE 20 MG/ML
INJECTION INTRAMUSCULAR; INTRAVENOUS AS NEEDED
Status: DISCONTINUED | OUTPATIENT
Start: 2018-08-16 | End: 2018-08-16 | Stop reason: HOSPADM

## 2018-08-16 RX ORDER — PROPOFOL 10 MG/ML
INJECTION, EMULSION INTRAVENOUS AS NEEDED
Status: DISCONTINUED | OUTPATIENT
Start: 2018-08-16 | End: 2018-08-16 | Stop reason: HOSPADM

## 2018-08-16 RX ORDER — PROTAMINE SULFATE 10 MG/ML
25-100 INJECTION, SOLUTION INTRAVENOUS
Status: DISCONTINUED | OUTPATIENT
Start: 2018-08-16 | End: 2018-08-17 | Stop reason: HOSPADM

## 2018-08-16 RX ORDER — FENTANYL CITRATE 50 UG/ML
INJECTION, SOLUTION INTRAMUSCULAR; INTRAVENOUS
Status: COMPLETED
Start: 2018-08-16 | End: 2018-08-16

## 2018-08-16 RX ORDER — ONDANSETRON 2 MG/ML
4 INJECTION INTRAMUSCULAR; INTRAVENOUS
Status: DISCONTINUED | OUTPATIENT
Start: 2018-08-16 | End: 2018-08-17 | Stop reason: HOSPADM

## 2018-08-16 RX ORDER — CEFAZOLIN SODIUM 1 G/3ML
INJECTION, POWDER, FOR SOLUTION INTRAMUSCULAR; INTRAVENOUS AS NEEDED
Status: DISCONTINUED | OUTPATIENT
Start: 2018-08-16 | End: 2018-08-16 | Stop reason: HOSPADM

## 2018-08-16 RX ORDER — FENTANYL CITRATE 50 UG/ML
INJECTION, SOLUTION INTRAMUSCULAR; INTRAVENOUS AS NEEDED
Status: DISCONTINUED | OUTPATIENT
Start: 2018-08-16 | End: 2018-08-16 | Stop reason: HOSPADM

## 2018-08-16 RX ORDER — DIPHENHYDRAMINE HYDROCHLORIDE 50 MG/ML
12.5 INJECTION, SOLUTION INTRAMUSCULAR; INTRAVENOUS AS NEEDED
Status: DISCONTINUED | OUTPATIENT
Start: 2018-08-16 | End: 2018-08-16 | Stop reason: HOSPADM

## 2018-08-16 RX ORDER — MORPHINE SULFATE 10 MG/ML
2 INJECTION, SOLUTION INTRAMUSCULAR; INTRAVENOUS
Status: DISCONTINUED | OUTPATIENT
Start: 2018-08-16 | End: 2018-08-16 | Stop reason: HOSPADM

## 2018-08-16 RX ORDER — SODIUM CHLORIDE 0.9 % (FLUSH) 0.9 %
5-10 SYRINGE (ML) INJECTION AS NEEDED
Status: DISCONTINUED | OUTPATIENT
Start: 2018-08-16 | End: 2018-08-17 | Stop reason: HOSPADM

## 2018-08-16 RX ORDER — MIDAZOLAM HYDROCHLORIDE 1 MG/ML
INJECTION, SOLUTION INTRAMUSCULAR; INTRAVENOUS
Status: COMPLETED
Start: 2018-08-16 | End: 2018-08-16

## 2018-08-16 RX ORDER — SODIUM CHLORIDE, SODIUM LACTATE, POTASSIUM CHLORIDE, CALCIUM CHLORIDE 600; 310; 30; 20 MG/100ML; MG/100ML; MG/100ML; MG/100ML
25 INJECTION, SOLUTION INTRAVENOUS CONTINUOUS
Status: DISCONTINUED | OUTPATIENT
Start: 2018-08-16 | End: 2018-08-16 | Stop reason: HOSPADM

## 2018-08-16 RX ORDER — FENTANYL CITRATE 50 UG/ML
50 INJECTION, SOLUTION INTRAMUSCULAR; INTRAVENOUS AS NEEDED
Status: DISCONTINUED | OUTPATIENT
Start: 2018-08-16 | End: 2018-08-16 | Stop reason: HOSPADM

## 2018-08-16 RX ORDER — FENTANYL CITRATE 50 UG/ML
12.5-5 INJECTION, SOLUTION INTRAMUSCULAR; INTRAVENOUS
Status: DISCONTINUED | OUTPATIENT
Start: 2018-08-16 | End: 2018-08-17 | Stop reason: HOSPADM

## 2018-08-16 RX ORDER — HEPARIN SODIUM 1000 [USP'U]/ML
INJECTION, SOLUTION INTRAVENOUS; SUBCUTANEOUS AS NEEDED
Status: DISCONTINUED | OUTPATIENT
Start: 2018-08-16 | End: 2018-08-16 | Stop reason: HOSPADM

## 2018-08-16 RX ORDER — HEPARIN SODIUM 1000 [USP'U]/ML
30000 INJECTION, SOLUTION INTRAVENOUS; SUBCUTANEOUS ONCE
Status: DISCONTINUED | OUTPATIENT
Start: 2018-08-16 | End: 2018-08-16 | Stop reason: HOSPADM

## 2018-08-16 RX ORDER — MIDAZOLAM HYDROCHLORIDE 1 MG/ML
1 INJECTION, SOLUTION INTRAMUSCULAR; INTRAVENOUS AS NEEDED
Status: DISCONTINUED | OUTPATIENT
Start: 2018-08-16 | End: 2018-08-16 | Stop reason: HOSPADM

## 2018-08-16 RX ORDER — ROCURONIUM BROMIDE 10 MG/ML
INJECTION, SOLUTION INTRAVENOUS AS NEEDED
Status: DISCONTINUED | OUTPATIENT
Start: 2018-08-16 | End: 2018-08-16 | Stop reason: HOSPADM

## 2018-08-16 RX ORDER — ONDANSETRON 2 MG/ML
4 INJECTION INTRAMUSCULAR; INTRAVENOUS AS NEEDED
Status: DISCONTINUED | OUTPATIENT
Start: 2018-08-16 | End: 2018-08-16 | Stop reason: HOSPADM

## 2018-08-16 RX ORDER — LIDOCAINE HYDROCHLORIDE 20 MG/ML
INJECTION, SOLUTION EPIDURAL; INFILTRATION; INTRACAUDAL; PERINEURAL AS NEEDED
Status: DISCONTINUED | OUTPATIENT
Start: 2018-08-16 | End: 2018-08-16 | Stop reason: HOSPADM

## 2018-08-16 RX ORDER — WARFARIN SODIUM 5 MG/1
5 TABLET ORAL
Status: DISCONTINUED | OUTPATIENT
Start: 2018-08-16 | End: 2018-08-17 | Stop reason: HOSPADM

## 2018-08-16 RX ORDER — ASPIRIN 81 MG/1
81 TABLET ORAL DAILY
Status: DISCONTINUED | OUTPATIENT
Start: 2018-08-17 | End: 2018-08-17 | Stop reason: HOSPADM

## 2018-08-16 RX ORDER — CEFAZOLIN SODIUM/WATER 2 G/20 ML
SYRINGE (ML) INTRAVENOUS
Status: DISCONTINUED
Start: 2018-08-16 | End: 2018-08-17 | Stop reason: HOSPADM

## 2018-08-16 RX ORDER — HEPARIN SODIUM 200 [USP'U]/100ML
1500 INJECTION, SOLUTION INTRAVENOUS ONCE
Status: COMPLETED | OUTPATIENT
Start: 2018-08-16 | End: 2018-08-17

## 2018-08-16 RX ORDER — HYDROCODONE BITARTRATE AND ACETAMINOPHEN 5; 325 MG/1; MG/1
1 TABLET ORAL
Status: DISCONTINUED | OUTPATIENT
Start: 2018-08-16 | End: 2018-08-17 | Stop reason: HOSPADM

## 2018-08-16 RX ORDER — FENTANYL CITRATE 50 UG/ML
25 INJECTION, SOLUTION INTRAMUSCULAR; INTRAVENOUS
Status: DISCONTINUED | OUTPATIENT
Start: 2018-08-16 | End: 2018-08-16 | Stop reason: HOSPADM

## 2018-08-16 RX ORDER — HEPARIN SODIUM 200 [USP'U]/100ML
INJECTION, SOLUTION INTRAVENOUS
Status: DISPENSED
Start: 2018-08-16 | End: 2018-08-16

## 2018-08-16 RX ORDER — MIDAZOLAM HYDROCHLORIDE 1 MG/ML
INJECTION, SOLUTION INTRAMUSCULAR; INTRAVENOUS AS NEEDED
Status: DISCONTINUED | OUTPATIENT
Start: 2018-08-16 | End: 2018-08-16 | Stop reason: HOSPADM

## 2018-08-16 RX ORDER — PROTAMINE SULFATE 10 MG/ML
INJECTION, SOLUTION INTRAVENOUS AS NEEDED
Status: DISCONTINUED | OUTPATIENT
Start: 2018-08-16 | End: 2018-08-16 | Stop reason: HOSPADM

## 2018-08-16 RX ORDER — NEBIVOLOL 2.5 MG/1
10 TABLET ORAL DAILY
Status: DISCONTINUED | OUTPATIENT
Start: 2018-08-17 | End: 2018-08-17 | Stop reason: HOSPADM

## 2018-08-16 RX ORDER — PROTAMINE SULFATE 10 MG/ML
INJECTION, SOLUTION INTRAVENOUS
Status: COMPLETED
Start: 2018-08-16 | End: 2018-08-16

## 2018-08-16 RX ORDER — LIDOCAINE HYDROCHLORIDE 10 MG/ML
0.1 INJECTION, SOLUTION EPIDURAL; INFILTRATION; INTRACAUDAL; PERINEURAL AS NEEDED
Status: DISCONTINUED | OUTPATIENT
Start: 2018-08-16 | End: 2018-08-16 | Stop reason: HOSPADM

## 2018-08-16 RX ORDER — ACETAMINOPHEN 325 MG/1
650 TABLET ORAL
Status: DISCONTINUED | OUTPATIENT
Start: 2018-08-16 | End: 2018-08-17 | Stop reason: HOSPADM

## 2018-08-16 RX ORDER — SODIUM CHLORIDE 0.9 % (FLUSH) 0.9 %
5-10 SYRINGE (ML) INJECTION EVERY 8 HOURS
Status: DISCONTINUED | OUTPATIENT
Start: 2018-08-16 | End: 2018-08-17 | Stop reason: HOSPADM

## 2018-08-16 RX ORDER — SODIUM CHLORIDE 9 MG/ML
INJECTION, SOLUTION INTRAVENOUS
Status: DISCONTINUED | OUTPATIENT
Start: 2018-08-16 | End: 2018-08-16 | Stop reason: HOSPADM

## 2018-08-16 RX ORDER — MIDAZOLAM HYDROCHLORIDE 1 MG/ML
1-5 INJECTION, SOLUTION INTRAMUSCULAR; INTRAVENOUS
Status: DISCONTINUED | OUTPATIENT
Start: 2018-08-16 | End: 2018-08-16

## 2018-08-16 RX ADMIN — ROCURONIUM BROMIDE 20 MG: 10 INJECTION, SOLUTION INTRAVENOUS at 09:33

## 2018-08-16 RX ADMIN — HEPARIN SODIUM 8000 UNITS: 1000 INJECTION, SOLUTION INTRAVENOUS; SUBCUTANEOUS at 09:54

## 2018-08-16 RX ADMIN — PROTAMINE SULFATE 20 MG: 10 INJECTION, SOLUTION INTRAVENOUS at 10:43

## 2018-08-16 RX ADMIN — IOPAMIDOL 85 ML: 755 INJECTION, SOLUTION INTRAVENOUS at 11:18

## 2018-08-16 RX ADMIN — HEPARIN SODIUM 3000 UNITS: 200 INJECTION, SOLUTION INTRAVENOUS at 09:47

## 2018-08-16 RX ADMIN — SODIUM CHLORIDE: 9 INJECTION, SOLUTION INTRAVENOUS at 10:00

## 2018-08-16 RX ADMIN — PROTAMINE SULFATE 5 MG: 10 INJECTION, SOLUTION INTRAVENOUS at 10:39

## 2018-08-16 RX ADMIN — ROCURONIUM BROMIDE 10 MG: 10 INJECTION, SOLUTION INTRAVENOUS at 09:23

## 2018-08-16 RX ADMIN — FENTANYL CITRATE 100 MCG: 50 INJECTION, SOLUTION INTRAMUSCULAR; INTRAVENOUS at 09:23

## 2018-08-16 RX ADMIN — HEPARIN SODIUM 3000 UNITS: 1000 INJECTION, SOLUTION INTRAVENOUS; SUBCUTANEOUS at 10:16

## 2018-08-16 RX ADMIN — PROPOFOL 150 MG: 10 INJECTION, EMULSION INTRAVENOUS at 09:23

## 2018-08-16 RX ADMIN — WARFARIN SODIUM 5 MG: 5 TABLET ORAL at 20:31

## 2018-08-16 RX ADMIN — PROTAMINE SULFATE 25 MG: 10 INJECTION, SOLUTION INTRAVENOUS at 10:41

## 2018-08-16 RX ADMIN — Medication 10 ML: at 20:31

## 2018-08-16 RX ADMIN — LIDOCAINE HYDROCHLORIDE 40 MG: 20 INJECTION, SOLUTION EPIDURAL; INFILTRATION; INTRACAUDAL; PERINEURAL at 09:23

## 2018-08-16 RX ADMIN — SODIUM CHLORIDE: 9 INJECTION, SOLUTION INTRAVENOUS at 09:19

## 2018-08-16 RX ADMIN — SUCCINYLCHOLINE CHLORIDE 120 MG: 20 INJECTION INTRAMUSCULAR; INTRAVENOUS at 09:23

## 2018-08-16 RX ADMIN — MIDAZOLAM HYDROCHLORIDE 1 MG: 1 INJECTION, SOLUTION INTRAMUSCULAR; INTRAVENOUS at 09:23

## 2018-08-16 RX ADMIN — MIDAZOLAM HYDROCHLORIDE 1 MG: 1 INJECTION, SOLUTION INTRAMUSCULAR; INTRAVENOUS at 09:20

## 2018-08-16 RX ADMIN — CEFAZOLIN SODIUM 2 G: 1 INJECTION, POWDER, FOR SOLUTION INTRAMUSCULAR; INTRAVENOUS at 09:23

## 2018-08-16 NOTE — ANESTHESIA PREPROCEDURE EVALUATION
Anesthetic History   No history of anesthetic complications            Review of Systems / Medical History  Patient summary reviewed, nursing notes reviewed and pertinent labs reviewed    Pulmonary  Within defined limits                 Neuro/Psych   Within defined limits           Cardiovascular    Hypertension        Dysrhythmias : atrial fibrillation  Pacemaker         GI/Hepatic/Renal  Within defined limits              Endo/Other        Arthritis     Other Findings              Physical Exam    Airway  Mallampati: II  TM Distance: > 6 cm  Neck ROM: normal range of motion   Mouth opening: Normal     Cardiovascular    Rhythm: irregular  Rate: normal         Dental    Dentition: Caps/crowns     Pulmonary  Breath sounds clear to auscultation               Abdominal  GI exam deferred       Other Findings            Anesthetic Plan    ASA: 3  Anesthesia type: general    Monitoring Plan: Arterial line and SUSHMA      Induction: Intravenous  Anesthetic plan and risks discussed with: Patient

## 2018-08-16 NOTE — ANESTHESIA PROCEDURE NOTES
SUSHMA  Date/Time: 8/16/2018 10:45 AM      Procedure Details: probe placement, image aquisition & interpretation    Timeout performed  Risks and benefits discussed with the patient and plans are to proceed    Procedure Note    Performed by: Laura Trent  Authorized by: Glinda Hashimoto D       Indications: assessment of surgical repair  Modalities: CF, 2D (3D)  Probe Type: multiplane  Insertion: atraumatic  Patient Status: intubated and sedated    Echocardiographic and Doppler Measurements   Aorta  Size  Diam(cm)  Dissection PlaqueThick(mm)  Plaque Mobile    Ascending normal  No      Arch         Descending               Valves  Annulus  Stenosis  Area/Grad  Regurg  Leaflet   Morph  Leaflet   Motion    Aortic normal none  0 thickened normal    Mitral dilated none  1+ thickened normal    Tricuspid normal none  1+ thickened normal          Atria  Size  SEC (smoke)  Thrombus  Tumor  Device    Rt Atrium normal No No No     Lt Atrium dilated No No No      Interatrial Septum Morphology: normal    Interventricular Septum Morphology: normal    Ventricle  Cavity Size  Cavity Dimension Hypertrophy  Thrombus  Gloal FXN  EF    RV normal  No no normal     LV normal   No normal 55-60       Regional Function  (1 = normal, 2 = mildly hypokinetic, 3 = severely hypokinetic, 4 = akinetic, 5 = dyskinetic) LAV - [place]   ME LAV = 0  ME LAV = 90  ME LAV = 130   Basal Sept:1 Basal Ant:1 Basal Post:1   Mid Sept:1 Mid Ant:1 Mid Post:1   Apical Sept:1 Apical Ant:1 Basal Ant Sept:1   Basal Lat:1 Basal Inf:1 Mid Ant Sept:1   Mid Lat:1 Mid Inf:1    Apical Lat:1 Apical Inf:1        Pericardium: normal    Post Intervention Follow-up Study  Ventricular Global Function: unchanged  Ventricular Regional Function: unchanged     Valve  Function  Regurgitation  Area    Aortic       Mitral       Tricuspid       Prosthetic        Complications: None  Comments: Watchman device deployed, no color flow around device into appendage, no pericardial effusion

## 2018-08-16 NOTE — IP AVS SNAPSHOT
[address] [place] [address] 
[phone] Patient: Clementina Dandy MRN: [id number] FSA:[id number] About your hospitalization You were admitted on:  August 16, 2018 You last received care in the:  [facility] INTRVNTNL CARDIO You were discharged on:  August 17, 2018 Why you were hospitalized Your primary diagnosis was:  Not on File Your diagnoses also included:  Atrial Fibrillation (Hcc) Follow-up Information Follow up With Details Comments Contact Tito Laura, DO   [address] [address] [place] [id number] 
[phone] Your Scheduled Appointments Monday August 20, 2018 11:00 AM EDT  
COUMADIN CLINIC with VERONICA RANGEL  
[facility] ([facility]YA SCHEDULING) [address] [facility] [phone] [facility]  
[phone] Friday September 07, 2018  3:20 PM EDT  
ESTABLISHED PATIENT with Junior Schwartz MD  
[facility] [address]) [address] [facility] [address]  
[phone] Tuesday October 09, 2018 11:30 AM EDT  
REMOTE OFFICE VISIT with Sydney Jones [facility] ([facility] SCHEDULING) [address] [facility] 600 1007 [facility]  
[phone] Discharge Orders None A check abida indicates which time of day the medication should be taken. My Medications START taking these medications Instructions Each Dose to Equal  
 Morning Noon Evening Bedtime  
 warfarin 5 mg tablet Commonly known as:  COUMADIN Your last dose was: Your next dose is: Take 1 Tab by mouth nightly. 5 mg CONTINUE taking these medications Instructions Each Dose to Equal  
 Morning Noon Evening Bedtime  
 aspirin delayed-release 81 mg tablet Your last dose was: Your next dose is: Take 1 Tab by mouth daily. 81 mg  
    
   
   
   
  
 BYSTOLIC 10 mg tablet Generic drug:  nebivolol Your last dose was: Your next dose is: TAKE ONE TABLET BY MOUTH DAILY COMPLETE SENIOR 0.4-300-250 mg-mcg-mcg Tab Generic drug:  multivit-min-FA-lycopen-lutein Your last dose was: Your next dose is: Take 1 Tab by mouth daily. 1 Tab FISH -1,000 mg capsule Generic drug:  fish oil-omega-3 fatty acids Your last dose was: Your next dose is: Take 1 Cap by mouth daily. 1 Cap  
    
   
   
   
  
 saw palmetto 160 mg Cap Your last dose was: Your next dose is: Take 160 mg by mouth three (3) times daily. 160 mg Where to Get Your Medications These medications were sent to [facility] [address] , [address]  [address] [place] [address] Phone:  [phone]  
  warfarin 5 mg tablet Discharge Instructions Left Atrial Appendage Occlusion (WATCHMAN) Discharge Instructions You have just underwent left atrial appendage occlusion utilizing a WATCHMAN device deployment. There were catheters temporarily placed in your heart through a puncture in the Veins and/or Arteries in your groin. WHAT TO EXPECT  
 
? If you have had a WATCHMAN procedure please notify Dr. Nguyễn Ellison immediately if you experience chest discomfort, shortness of breath or feeling like you are going to pass out. If the symptoms becomes severe or breathing becomes difficult, call 911 or go to the closest emergency room. ? Mild to moderate, non-painful, bruising at the puncture site is not un-common, and will resolve in 7  10 days.  
 
? If a closure device was used to seal your artery or vein, a separate pamphlet will be given to you with these discharge instructions. It is very important that you review the information in the pamphlet for different restrictions and precautions. ? You have a small gauze dressing applied to the puncture site in your groin. You may remove this the following morning. MEDICATIONS ? Take only the medications prescribed to you at discharge. ACTIVITY ? A responsible adult must take you home. Do not drive a car for 72 hours. ? Rest quietly for the remainder of the day. ? Do not lift anything greater than 10 pounds for 3 days. ? Limit bending at the puncture site and use of stairs for at least 3 days. ? You may remove the bandage and shower the morning after the procedure. Do not take a bath for 3 days. SYMPTOMS THAT NEED TO BE REPORTED IMMEDIATELY  
 
? Bleeding at the puncture site. If there is bleeding, lie down and hold firm direct pressure for at least 5 minutes. If the bleeding does not stop, go to the closest emergency room, or call 911. ? Temperature more than 100.5 F. 
? Redness or warmth at the puncture site. ? Increasing pain, numbness, coolness or blue discoloration of the extremity where the puncture is located. ? Pulsating mass at the puncture site. ? A new lump at the puncture site, or increasing swelling at the site. ? Bruising at the puncture site that enlarges or becomes painful (some bruising at the site is common and will go away in 7  10 days). ? Rapid heart rate or palpitations. ? Dizziness, lightheadedness, fainting. ? REMEMBER: If you feel something is an emergency or cannot be handled over the phone, call 911 or go to the closest emergency room. FOLLOW UP CARE Dr. Patrick Espinal in 2 weeks Peter Jacobsen MD 
Cardiac Electrophysiology / Cardiology [address] , [address] Prisca Gallegospietromerle Randal Tamez 
[phone] / [phone] Fax   [phone] / [phone] Fax Introducing Kenny Byrd As a [facility] patient, I wanted to make you aware of our electronic visit tool called Kenny Carrionfin. [facility] 24/7 allows you to connect within minutes with a medical provider 24 hours a day, seven days a week via a mobile device or tablet or logging into a secure website from your computer. You can access Kenny Carrionfin from anywhere in the [place]. A virtual visit might be right for you when you have a simple condition and feel like you just dont want to get out of bed, or cant get away from work for an appointment, when your regular [facility] provider is not available (evenings, weekends or holidays), or when youre out of town and need minor care. Electronic visits cost only $49 and if the [facility] 24/7 provider determines a prescription is needed to treat your condition, one can be electronically transmitted to a nearby pharmacy*. Please take a moment to enroll today if you have not already done so. The enrollment process is free and takes just a few minutes. To enroll, please download the [employer]" sully to your tablet or phone, or visit [email or web address] to enroll on your computer. And, as an [address] [place] [address] patient with a [employer] account, the results of your visits will be scanned into your electronic medical record and your primary care provider will be able to view the scanned results. We urge you to continue to see your regular [facility] provider for your ongoing medical care. And while your primary care provider may not be the one available when you seek a Kenny Byrd virtual visit, the peace of mind you get from getting a real diagnosis real time can be priceless.    
 
For more information on Kenny Beauchampmarnifin, view our Frequently Asked Questions (FAQs) at [email or web address]. Sincerely, 
 
Orquidea Alarcon MD 
Chief Medical Officer [employer] *:  certain medications cannot be prescribed via Kenny Byrd Providers Seen During Your Hospitalization Provider Specialty Primary office phone Oli MD [facility] [phone] Your Primary Care Physician (PCP) Primary Care Physician Office Phone Office Fax Cailin Bradley [phone] You are allergic to the following No active allergies Recent Documentation Height Weight BMI Smoking Status 1.88 m 83.9 kg 23.75 kg/m2 Never Smoker Emergency Contacts Name Discharge Info Relation Home Work Mobile Nata Bishop DISCHARGE CAREGIVER [3] Spouse [3] [phone] Patient Belongings The following personal items are in your possession at time of discharge: 
  Dental Appliances: None  Visual Aid: None      Home Medications: None   Jewelry: None  Clothing: At bedside    Other Valuables: None Please provide this summary of care documentation to your next provider. Signatures-by signing, you are acknowledging that this After Visit Summary has been reviewed with you and you have received a copy. Patient Signature:  ____________________________________________________________ Date:  ____________________________________________________________  
  
Aloma Snellen Provider Signature:  ____________________________________________________________ Date:  ____________________________________________________________

## 2018-08-16 NOTE — ANESTHESIA POSTPROCEDURE EVALUATION
Post-Anesthesia Evaluation and Assessment    Patient: Jacqueline Wade MRN: [id number]  SSN: [id number]    YOB: 1940  Age: 77 y.o. Sex: male       Cardiovascular Function/Vital Signs  Visit Vitals    /79 (BP 1 Location: Right arm)    Pulse 60    Temp 36.3 °C (97.4 °F)    Resp 16    Ht 6' 2\" (1.88 m)    Wt 83.9 kg (185 lb)    SpO2 100%    BMI 23.75 kg/m2       Patient is status post general anesthesia for * No procedures listed *. Nausea/Vomiting: None    Postoperative hydration reviewed and adequate. Pain:  Pain Scale 1: Numeric (0 - 10) (08/16/18 1251)  Pain Intensity 1: 0 (08/16/18 1251)   Managed    Neurological Status:   Neuro (WDL): Within Defined Limits (08/16/18 1230)   At baseline    Mental Status and Level of Consciousness: Arousable    Pulmonary Status:   O2 Device: Room air (08/16/18 1251)   Adequate oxygenation and airway patent    Complications related to anesthesia: None    Post-anesthesia assessment completed.  No concerns    Signed By: Flory Garcia MD     August 16, 2018

## 2018-08-16 NOTE — ANESTHESIA PROCEDURE NOTES
Arterial Line Placement    Start time: 8/16/2018 8:35 AM  End time: 8/16/2018 8:42 AM  Performed by: Nieves Meng  Authorized by: Ruel GARRETT     Pre-Procedure  Indications:  Arterial pressure monitoring  Preanesthetic Checklist: patient identified, risks and benefits discussed, site marked, patient being monitored, timeout performed and patient being monitored      Procedure:   Prep:  Chlorhexidine  Seldinger Technique?: Yes    Orientation:  Right  Location:  Radial artery  Catheter size:  20 G  Number of attempts:  1  Cont Cardiac Output Sensor: No      Assessment:   Post-procedure:  Line secured and sterile dressing applied  Patient Tolerance:  Patient tolerated the procedure well with no immediate complications

## 2018-08-16 NOTE — PERIOP NOTES
TRANSFER - OUT REPORT:    Verbal report given to Flory Peterson (name) on Domingo Tejeda  being transferred to [facility](unit) for routine post - op       Report consisted of patients Situation, Background, Assessment and   Recommendations(SBAR). Information from the following report(s) SBAR, Kardex, OR Summary, Intake/Output, MAR and Cardiac Rhythm paced was reviewed with the receiving nurse. Opportunity for questions and clarification was provided.       Patient transported with:   Monitor  Registered Nurse  Tech   Dr. Josefa Torres called (anesthesia), discussed pts status, VSS, no c/o chest pain, no n/v, pt no c/o, stable for IVCU transfer, Christiana has been dcd, okd for transfer to [employer]

## 2018-08-16 NOTE — PROCEDURES
Cardiac Electrophysiology Report      PATIENT INFORMATION        Patient Name: Mayito Griffin  MRN: [id number]           Study Date: 2018    YOB: 1940   Age: 77 y.o. Gender: male      Procedure:  Endocardial Left Atrial Appendage Clarence Cabezas    Referring Physician:  Rizwana Velazquez DO and Dr. Jessica Tuttle     Duty Name   Electrophysiologist Garcia Bal MD   Monitor Anesthesia Service   Circulator Julienne Hoover RN; Ravi Harmon RN; Yesenia Meek, RT Ruth Ann Carmichael RN       PATIENT HISTORY     Mayito Griffin is a 77 y.o. male with hypertension, long-standing persistent atrial fibrillation, single-chamber PPM who has been intolerant of aspirin in the past due to bruising/bleeding and refuses anticoagulation as a result as well. He presents for left atrial appendage occlusion to lower CVA risk. PROCEDURE     The patient was brought to the Cardiac Electrophysiology laboratory in a post-absorptive, fasting state. Informed consent was obtained. A peripheral IV was in place. Continuous electrocardiographic, blood pressure, O2 saturation and  CO2 monitoring was initiated. Self-adhesive cardioversion patches were positioned on the chest. 500 cc of normal saline was administered intravenously. General anesthesia was effectuated by the anesthesia service. Intraoperative transesophageal imaging was performed by the anesthesia service. There was no evidence of visualized thrombus in the left atrial appendage. Measurements of the BRITTNEE ostium width and BRITTNEE depth were performed. The patient was then prepped and draped in the usual sterile fashion. Both groins were infiltrated with a 50/50 mixture of Lidocaine (1%) and bupivicaine (0.5%). Intravenous antibiotics were infused. Vascular access was obtained and an SL1 sheath and an 8F sheath were placed in the right common femoral vein using the modified Seldinger technique.  Through the SL1 sheath, a 0.032, 145-cm Ermias Seen wire was advanced to the level of the superior vena cava. After the guidewire was withdrawn, a Hermila transseptal needle was introduced into the sheath. The needle/sheath assembly was withdrawn under fluoroscopic and intraoperative SUSHMA guidance until the tip of the sheath prolapsed into the fossa ovalis. Appropriate positioning was confirmed with multiple fluoroscopic views and SUSHMA imaging. Transseptal access was obtained following delivery of RF energy with the [facility] needle. Systemic heparinization was initiated and the sheath was flushed continuously with heparinized saline. Anticoagulation status was monitored with frequent ACT measurements. Heparin was given in interrupted doses to maintain an ACT > 300 sec. The dilator was advanced over the wire, followed by the sheath. Mean left arterial pressure was measured and was found to be 20 mm Hg. The 0.032 wire was then exchanged for an 0.032 mm Hermila wire which was positioned the left atrium. The dilator was then removed over the wire. A [employer] double curve access sheath was then advanced over the wire into the left atrium. A 6F pigtail was then advanced over the wire and was then positioned into the left atrial appendage. Arteriogram of the left atrial appendage was performed. The pigtail was then removed. The WATCHMAN device delivery system was then advanced to the tip of the access sheath. The WATCHMAN device was then deployed. Device release criteria were met and the device was deployed. The access sheath/device delivery system was then withdrawn to the right atrium. There was no pericardial effusion noted at the conclusion of the procedure. Following a test dose, protamine 50 mg was administered and once the ACT was found to be < 180 seconds, all catheters and sheaths were removed and hemostasis obtained by direct manual compression.  The patient was extubated, hemodynamically stable, tolerated the procedure well and was transferred in stable condition. There were no immediate complications encountered during the procedure. There was minimal blood loss and no specimen were removed. WATCHMAN DEVICE DATA      Size Serial #   [employer] 27 mm [id number]       FINDINGS     1. The baseline ECG revealed atrial fibrillation  2. Endocardial left atrial appendage occlusion was performed utilizing a 27 mm WATCHMAN device. 3. All pass criteria for position, anchor, size and seal were met (positive tug test, no evidence of para-device leak by color flow Doppler ultrasound imaging on SUSHMA, no evidence of para-device flow leak on arteriogram on fluoroscopy and there was > 8% compression of the device on SUSHMA). 4. No evidence of early pericardial effusion on SUSHMA post-device deployment. RADIOLOGY SUMMARY     Total    Fluoro Time (minutes)  8.1    Dose Area Product (mGy)  739        CONCLUSIONS      1. Successful left atrial appendage occlusion utilizing WATCHMAN device. 2. Monitor on telemetry overnight. 3. Start coumadin 5 mg oral daily x 45 days. Follow up in coumadin clinic. 4. Limited echocardiogram tomorrow to evaluate for late pericardial effusion. 5. Resume all other home medications  6. Follow up in 2 weeks in EP clinic with Dr. Se hCa   7. Repeat SUSHMA in 45 days. 8. Follow up with Sweetie Pinto DO and Dr. Giancarlo Brooks as scheduled. Thank you, Sweetie Pinto DO and Dr. Giancarlo Brooks for allowing me to participate in the care of this extraordinarily pleasant male.        Cha An MD  Cardiac Electrophysiology / Cardiology    [facility].  [address] [place] [address], [address] [place] [address], [address] [place] [address]  [phone] / [phone] Fax   [phone] / [phone] Fax

## 2018-08-16 NOTE — H&P
HISTORY OF PRESENTING ILLNESS      Heather Palafox is a 77 y.o. male with hypertension, long-standing persistent atrial fibrillation, single-chamber PPM who has been intolerant of aspirin in the past due to bruising/bleeding and refuses anticoagulation as a result as well. He presents for left atrial appendage occlusion to lower CVA risk.         ACTIVE PROBLEM LIST     Patient Active Problem List    Diagnosis Date Noted    Atrial fibrillation ([facility] 08/16/2018     Priority: 1 - One    Anticoagulant drug declined 07/16/2018    ACP (advance care planning) 07/13/2016    Pacemaker 11/02/2015    Essential hypertension 07/20/2015    Chronic atrial fibrillation ([facility] 07/20/2015           PAST MEDICAL HISTORY     Past Medical History:   Diagnosis Date    A-fib [facility])     discovered 2008    Arthritis     Knees    Hypertension     Pacemaker     placed  Nov 2011 ([address])    Sick sinus syndrome ([facility] [address] [facility]     heart monitor showed long pauses (> 4 sec) in 2011           PAST SURGICAL HISTORY     Past Surgical History:   Procedure Laterality Date    HX HERNIA REPAIR      HX PACEMAKER            ALLERGIES     No Known Allergies       FAMILY HISTORY     Family History   Problem Relation Age of Onset    Diabetes Maternal Aunt     No Known Problems Mother     Dementia Father     negative for cardiac disease       SOCIAL HISTORY     Social History     Social History    Marital status:      Spouse name: N/A    Number of children: N/A    Years of education: N/A     Social History Main Topics    Smoking status: Never Smoker    Smokeless tobacco: Never Used    Alcohol use No    Drug use: No    Sexual activity: Not Asked     Other Topics Concern    None     Social History Narrative         MEDICATIONS     Current Facility-Administered Medications   Medication Dose Route Frequency    fentaNYL citrate (PF) 50 mcg/mL injection        midazolam (VERSED) 1 mg/mL injection        heparin (porcine) 1,000 unit/mL injection 30,000 Units  30,000 Units IntraVENous ONCE    fentaNYL citrate (PF) injection 12.5-50 mcg  12.5-50 mcg IntraVENous Multiple    midazolam (VERSED) injection 1-5 mg  1-5 mg IntraVENous Multiple    protamine injection  mg   mg IntraVENous Multiple    heparinized saline 2 units/mL infusion 3,000 Units  1,500 mL Irrigation ONCE    iopamidol (ISOVUE-370) 76 % injection 50 mL  50 mL IntraVENous ONCE    protamine 10 mg/mL injection        iopamidol (ISOVUE-370) 76 % injection        ceFAZolin (ANCEF) in sterile water 2 gram/20 mL IV syringe           I have reviewed the nurses notes, vitals, problem list, allergy list, medical history, family, social history and medications. REVIEW OF SYMPTOMS      General: Pt denies excessive weight gain or loss. Pt is able to conduct ADL's  HEENT: Denies blurred vision, headaches, hearing loss, epistaxis and difficulty swallowing. Respiratory: Denies cough, congestion, shortness of breath, LOMELI, wheezing or stridor. Cardiovascular: Denies precordial pain, palpitations, edema or PND  Gastrointestinal: Denies poor appetite, indigestion, abdominal pain or blood in stool  Genitourinary: Denies hematuria, dysuria, increased urinary frequency  Musculoskeletal: Denies joint pain or swelling from muscles or joints  Neurologic: Denies tremor, paresthesias, headache, or sensory motor disturbance  Psychiatric: Denies confusion, insomnia, depression  Integumentray: Denies rash, itching or ulcers. Hematologic: Denies easy bruising, bleeding       PHYSICAL EXAMINATION      Vitals:    08/16/18 0804 08/16/18 0826   BP: (!) 183/95    Pulse: 69    Resp: 15    Temp: 97.5 °F (36.4 °C)    SpO2: 97%    Weight:  185 lb (83.9 kg)   Height:  6' 2\" (1.88 m)     General: Well developed, in no acute distress.   HEENT: No jaundice, oral mucosa moist, no oral ulcers  Neck: Supple, no stiffness, no lymphadenopathy, supple  Heart:  Normal S1/S2 negative S3 or S4. Regular, no murmur, gallop or rub, no jugular venous distention  Respiratory: Clear bilaterally x 4, no wheezing or rales  Abdomen:   Soft, non-tender, bowel sounds are active.   Extremities:  No edema, normal cap refill, no cyanosis. Musculoskeletal: No clubbing, no deformities  Neuro: A&Ox3, speech clear, gait stable, cooperative, no focal neurologic deficits  Skin: Skin color is normal. No rashes or lesions. Non diaphoretic, moist.  Vascular: 2+ pulses symmetric in all extremities       DIAGNOSTIC DATA      EKG:        LABORATORY DATA      Lab Results   Component Value Date/Time    WBC 5.6 08/06/2018 08:13 AM    HGB 15.2 08/06/2018 08:13 AM    HCT 45.1 08/06/2018 08:13 AM    PLATELET 089 47/58/2888 08:13 AM    MCV 95.1 08/06/2018 08:13 AM      Lab Results   Component Value Date/Time    Sodium 137 08/06/2018 08:13 AM    Potassium 3.9 08/06/2018 08:13 AM    Chloride 101 08/06/2018 08:13 AM    CO2 27 08/06/2018 08:13 AM    Anion gap 9 08/06/2018 08:13 AM    Glucose 183 (H) 08/06/2018 08:13 AM    BUN 26 (H) 08/06/2018 08:13 AM    Creatinine 1.26 08/06/2018 08:13 AM    BUN/Creatinine ratio 21 (H) 08/06/2018 08:13 AM    GFR est AA >60 08/06/2018 08:13 AM    GFR est non-AA 55 (L) 08/06/2018 08:13 AM    Calcium 8.9 08/06/2018 08:13 AM    Bilirubin, total 1.9 (H) 11/09/2017 08:24 AM    AST (SGOT) 26 11/09/2017 08:24 AM    Alk. phosphatase 85 11/09/2017 08:24 AM    Protein, total 7.0 11/09/2017 08:24 AM    Albumin 4.5 11/09/2017 08:24 AM    A-G Ratio 1.8 11/09/2017 08:24 AM    ALT (SGPT) 22 11/09/2017 08:24 AM           ASSESSMENT      1. Atrial fibrillation              A. Long-standing persistent              B. Poorly tolerant of antiplatelet therapy  2. PPM                A. Single chamber              B. [employer]  3. Lead malfunction              A.  Insulation breath       PLAN     WATCHMAN            Tim Mendoza MD  Cardiac Electrophysiology / Cardiology    [address] [place] [address], [address] [place] [address] Drive    [facility]  [phone] / [phone] Fax   [phone] / [phone] Fax

## 2018-08-16 NOTE — PROGRESS NOTES
Cardiac Cath Lab Recovery Arrival Note:      Edna Nolasco arrived to Cardiac Cath Lab, Recovery Area. Staff introduced to patient. Patient identifiers verified with NAME and DATE OF BIRTH. Procedure verified with patient. Consent forms reviewed and signed by patient or authorized representative and verified. Allergies verified. Patient and family oriented to department. Patient and family informed of procedure and plan of care. Questions answered with review. Patient prepped for procedure, per orders from physician, prior to arrival.    Patient on cardiac monitor, non-invasive blood pressure, SPO2 monitor. On room air. Patient is A&Ox 3. Patient reports no compliants. Patient in stretcher, in low position, with side rails up, call bell within reach, patient instructed to call if assistance as needed. Patient prep in: [address], [place] [address]. Family in: waiting area.    Prep by: Tom Lara RN

## 2018-08-16 NOTE — DISCHARGE INSTRUCTIONS
Left Atrial Appendage Occlusion (WATCHMAN)   Discharge Instructions      You have just underwent left atrial appendage occlusion utilizing a WATCHMAN device deployment. There were catheters temporarily placed in your heart through a puncture in the Veins and/or Arteries in your groin. WHAT TO EXPECT      If you have had a WATCHMAN procedure please notify Dr. Mukesh Jackson immediately if you experience chest discomfort, shortness of breath or feeling like you are going to pass out. If the symptoms becomes severe or breathing becomes difficult, call 911 or go to the closest emergency room.  Mild to moderate, non-painful, bruising at the puncture site is not un-common, and will resolve in 7 - 10 days.  If a closure device was used to seal your artery or vein, a separate pamphlet will be given to you with these discharge instructions. It is very important that you review the information in the pamphlet for different restrictions and precautions.  You have a small gauze dressing applied to the puncture site in your groin. You may remove this the following morning. MEDICATIONS      Take only the medications prescribed to you at discharge. ACTIVITY      A responsible adult must take you home. Do not drive a car for 72 hours.  Rest quietly for the remainder of the day.  Do not lift anything greater than 10 pounds for 3 days.  Limit bending at the puncture site and use of stairs for at least 3 days.  You may remove the bandage and shower the morning after the procedure. Do not take a bath for 3 days. SYMPTOMS THAT NEED TO BE REPORTED IMMEDIATELY      Bleeding at the puncture site. If there is bleeding, lie down and hold firm direct pressure for at least 5 minutes. If the bleeding does not stop, go to the closest emergency room, or call 911.  Temperature more than 100.5 F.   Redness or warmth at the puncture site.    Increasing pain, numbness, coolness or blue discoloration of the extremity where the puncture is located.  Pulsating mass at the puncture site.  A new lump at the puncture site, or increasing swelling at the site.  Bruising at the puncture site that enlarges or becomes painful (some bruising at the site is common and will go away in 7 - 10 days).  Rapid heart rate or palpitations.  Dizziness, lightheadedness, fainting.  REMEMBER: If you feel something is an emergency or cannot be handled over the phone, call 911 or go to the closest emergency room.       Ihsan Robertson in 2 weeks          Ryan Dimas MD  Cardiac Electrophysiology / Cardiology    [facility].  [address]  [phone] / [phone] Fax   [phone] / [phone] Fax

## 2018-08-16 NOTE — PERIOP NOTES
Handoff Report from Operating Room to PACU    Report received from [facility]  and [address]  regarding Martin Francisco. Surgeon(s):  Case Anesthesia  And Procedure(s) (LRB):  PACU/RECOVERY                EP/LAB (N/A)  confirmed   with dressings discussed. Anesthesia type, drugs, patient history, complications, estimated blood loss, vital signs, intake and output, and last pain medication were reviewed.

## 2018-08-16 NOTE — PROGRESS NOTES
1250-pt arrived to room, VSS, right groin site has small hematoma, resolved after 10min hand held pressure, no complaints from pt.  1545-pt resting in bed, no complaints

## 2018-08-17 VITALS
RESPIRATION RATE: 16 BRPM | OXYGEN SATURATION: 100 % | HEIGHT: 74 IN | DIASTOLIC BLOOD PRESSURE: 75 MMHG | TEMPERATURE: 97.5 F | WEIGHT: 185 LBS | SYSTOLIC BLOOD PRESSURE: 123 MMHG | HEART RATE: 60 BPM | BODY MASS INDEX: 23.74 KG/M2

## 2018-08-17 LAB
ACT BLD: 142 SECS (ref 79–138)
INR PPP: 1.2 (ref 0.9–1.1)
PROTHROMBIN TIME: 11.8 SEC (ref 9–11.1)

## 2018-08-17 PROCEDURE — 93308 TTE F-UP OR LMTD: CPT

## 2018-08-17 PROCEDURE — 36415 COLL VENOUS BLD VENIPUNCTURE: CPT | Performed by: INTERNAL MEDICINE

## 2018-08-17 PROCEDURE — 85610 PROTHROMBIN TIME: CPT | Performed by: INTERNAL MEDICINE

## 2018-08-17 PROCEDURE — 74011250637 HC RX REV CODE- 250/637: Performed by: INTERNAL MEDICINE

## 2018-08-17 RX ORDER — WARFARIN SODIUM 5 MG/1
5 TABLET ORAL
Qty: 30 TAB | Refills: 5 | Status: SHIPPED | OUTPATIENT
Start: 2018-08-17 | End: 2018-10-01

## 2018-08-17 RX ADMIN — NEBIVOLOL HYDROCHLORIDE 10 MG: 2.5 TABLET ORAL at 09:04

## 2018-08-17 RX ADMIN — ASPIRIN 81 MG: 81 TABLET ORAL at 09:04

## 2018-08-17 RX ADMIN — Medication 10 ML: at 06:10

## 2018-08-17 NOTE — PROGRESS NOTES
[address] [place] [address]  [phone]      Cardiology Progress Note      8/17/2018 10:15 AM    Admit Date: 8/16/2018    Admit Diagnosis:   cath  Atrial fibrillation (HCC)  WATCHMAN    Subjective: Shashi Gupta has no complaints s/p Watchman device implant. Visit Vitals    /75 (BP 1 Location: Right arm, BP Patient Position: At rest)    Pulse 60    Temp 97.5 °F (36.4 °C)    Resp 16    Ht 6' 2\" (1.88 m)    Wt 83.9 kg (185 lb)    SpO2 100%    BMI 23.75 kg/m2       Current Facility-Administered Medications   Medication Dose Route Frequency    fentaNYL citrate (PF) injection 12.5-50 mcg  12.5-50 mcg IntraVENous Multiple    protamine injection  mg   mg IntraVENous Multiple    ceFAZolin (ANCEF) in sterile water 2 gram/20 mL IV syringe        sodium chloride (NS) flush 5-10 mL  5-10 mL IntraVENous Q8H    sodium chloride (NS) flush 5-10 mL  5-10 mL IntraVENous PRN    acetaminophen (TYLENOL) tablet 650 mg  650 mg Oral Q4H PRN    HYDROcodone-acetaminophen (NORCO) 5-325 mg per tablet 1 Tab  1 Tab Oral Q4H PRN    ondansetron (ZOFRAN) injection 4 mg  4 mg IntraVENous Q4H PRN    warfarin (COUMADIN) tablet 5 mg  5 mg Oral QHS    nebivolol (BYSTOLIC) tablet 10 mg  10 mg Oral DAILY    aspirin delayed-release tablet 81 mg  81 mg Oral DAILY       Objective:      Physical Exam:  General Appearance:  elderly  male in no acute distress  Chest:   Clear  Cardiovascular:  paced, no murmur.   Abdomen:   Soft, non-tender, bowel sounds are active.   Extremities: right groin site D/I, no hematoma, +DP/PT  Skin:  Warm and dry.     Data Review:   No results for input(s): WBC, HGB, HCT, PLT, HGBEXT, HCTEXT, PLTEXT in the last 72 hours. Recent Labs      08/17/18   0519   INR  1.2*       No results for input(s): TROIQ, CPK, CKMB in the last 72 hours.       Intake/Output Summary (Last 24 hours) at 08/17/18 1015  Last data filed at 08/16/18 2328   Gross per 24 hour Intake              540 ml   Output              450 ml   Net               90 ml        Telemetry: paced    Assessment:     Active Problems:    Atrial fibrillation (HCC) (8/16/2018)        Plan:     Afib:  Paced rhythm, s/p Watchman device implant  Start on Coumadin 5mg daily with f/u to coumadin clinic next week  F/u with Dr. Magen Layton 2 weeks.     Discharge to home      [facility]ela Garcia [facility]  Cardiology

## 2018-08-17 NOTE — PROGRESS NOTES
First Initial PCP JC appt scheduled with Dr. Bruna Bosworth on 8/24/2018 at 11:10am. Appt added to [address] [facility] Specialist suite

## 2018-08-17 NOTE — PROGRESS NOTES
Bedside report received from Chris Martin RN                  Assessment, Background, Procedure summary, Intake/Output, MAR, and recent results discussed. Care assumed. Echo complete    Clotting pad and Tegaderm removed. No suture present to R groin . Discharge instructions reviewed with patient and family. Allowed adequate time to ask questions, all questions answered. Printed copy of AVS given to patient. All belongings gathered, IV and tele discontinued. Transported via wheelchair by volunteer to main entrance and into care of family.

## 2018-08-20 ENCOUNTER — CLINICAL SUPPORT (OUTPATIENT)
Dept: CARDIOLOGY CLINIC | Age: 78
End: 2018-08-20

## 2018-08-20 DIAGNOSIS — Z79.01 CHRONIC ANTICOAGULATION: Primary | ICD-10-CM

## 2018-08-20 DIAGNOSIS — I48.91 ATRIAL FIBRILLATION, UNSPECIFIED TYPE (HCC): ICD-10-CM

## 2018-08-20 LAB
ACT BLD: 224 SECS (ref 79–138)
ACT BLD: 252 SECS (ref 79–138)

## 2018-08-20 NOTE — PROGRESS NOTES
Coumadin Flowsheet Values Recorded Today: Date: 08/20/18  DX:  Atrial Fibrillation  Dosage: 5 mg  Sunday: 5  Monday: 5  Tuesday: 5  Wednesday: 5  Thursday: 5  Friday: 5  Saturday: 5  PROTIME results: 21.7  INR results: 1.8  INR Results Ranges: 2-3  Denies missing dose or extra dose?: No  Bleeding or unusual bruising?: No  Blood in stool or urine?: No  Coughing up blood?: No  Same dose?: No  Change dose to: 2.5mg on Monday and Tuesday, 5 mg other days  Next Check: 08/27/18  Comments: Pt has been on Warfarin x 3 doses

## 2018-08-24 NOTE — DISCHARGE SUMMARY
Cardiac Electrophysiology Discharge Summary       Patient ID:  Christina Rubin  [id number]  79 y.o.  1940    Admit Date: 8/16/2018    Discharge Date: 8/17/2018    Admitting Physician: Angel Rivera MD     Discharge Physician: Angel Rivera MD    Admission Diagnoses: cath;Atrial fibrillation (HCC);WATCHMAN    Discharge Diagnoses: Active Problems:    Atrial fibrillation ([address] (8/16/2018)        Discharge Condition: stable    Cardiology Procedures this Admission:      Hospital Course: Patient underwent above procedure without complication and remained stable without acute events. Discharge Exam:  Visit Vitals    /75 (BP 1 Location: Right arm, BP Patient Position: At rest)    Pulse 60    Temp 97.5 °F (36.4 °C)    Resp 16    Ht 6' 2\" (1.88 m)    Wt 185 lb (83.9 kg)    SpO2 100%    BMI 23.75 kg/m2       Abdomen: soft, non-tender  Extremities: extremities normal  Heart: regular rate and rhythm  Lungs: clear to auscultation bilaterally  Neck: no JVD  Neurologic: Grossly normal  Pulses: 2+ and symmetric    Disposition: Home    Patient Instructions:   Cannot display discharge medications since this patient is not currently admitted. Referenced discharge instructions provided by nursing for diet and activity.     Follow-up with Angel Rivera MD             Signed:  Ruma Knight MD  Cardiac Electrophysiology  8/17/2018  1019 AM

## 2018-08-27 ENCOUNTER — CLINICAL SUPPORT (OUTPATIENT)
Dept: CARDIOLOGY CLINIC | Age: 78
End: 2018-08-27

## 2018-08-27 DIAGNOSIS — I48.20 CHRONIC ATRIAL FIBRILLATION (HCC): ICD-10-CM

## 2018-08-27 DIAGNOSIS — Z79.01 LONG TERM (CURRENT) USE OF ANTICOAGULANTS: Primary | ICD-10-CM

## 2018-08-27 LAB
INR BLD: NORMAL
PT POC: NORMAL SECONDS
VALID INTERNAL CONTROL?: YES

## 2018-09-04 ENCOUNTER — CLINICAL SUPPORT (OUTPATIENT)
Dept: CARDIOLOGY CLINIC | Age: 78
End: 2018-09-04

## 2018-09-04 DIAGNOSIS — I10 ESSENTIAL HYPERTENSION: ICD-10-CM

## 2018-09-04 DIAGNOSIS — Z79.01 LONG TERM (CURRENT) USE OF ANTICOAGULANTS: Primary | ICD-10-CM

## 2018-09-04 DIAGNOSIS — I48.91 ATRIAL FIBRILLATION, UNSPECIFIED TYPE (HCC): ICD-10-CM

## 2018-09-04 LAB
INR BLD: 2.6
INR BLD: 2.8
INR, EXTERNAL: 2.6 (ref 2–3)
PT POC: 31.4 SECONDS
PT POC: 33.2 SECONDS
VALID INTERNAL CONTROL?: YES
VALID INTERNAL CONTROL?: YES

## 2018-09-06 ENCOUNTER — OFFICE VISIT (OUTPATIENT)
Dept: FAMILY MEDICINE CLINIC | Age: 78
End: 2018-09-06

## 2018-09-06 VITALS
TEMPERATURE: 98 F | WEIGHT: 183.2 LBS | HEIGHT: 74 IN | DIASTOLIC BLOOD PRESSURE: 72 MMHG | SYSTOLIC BLOOD PRESSURE: 120 MMHG | HEART RATE: 60 BPM | RESPIRATION RATE: 14 BRPM | OXYGEN SATURATION: 96 % | BODY MASS INDEX: 23.51 KG/M2

## 2018-09-06 DIAGNOSIS — I10 ESSENTIAL HYPERTENSION: Primary | ICD-10-CM

## 2018-09-06 DIAGNOSIS — Z23 ENCOUNTER FOR IMMUNIZATION: ICD-10-CM

## 2018-09-06 NOTE — MR AVS SNAPSHOT
[address] [address] 
[phone] Patient: Lidia Arriaza MRN: [id number] Visit Information Date & Time Provider Department Dept. Phone Encounter #  
 9/6/2018  2:00 PM [facility], [address] SARAH Haines [phone] [id number] Follow-up Instructions Return in about 6 weeks (around 10/18/2018), or if symptoms worsen or fail to improve. Your Appointments 9/7/2018  3:20 PM  
ESTABLISHED PATIENT with Magdalena Hankins MD  
[facility]) Appt Note: 2wk hosp fu appt [facility] [place] [address] [id number]  
[address] [address] [address]  
  
    
 7/15/2019  9:15 AM  
PACEMAKER with PACEMAKER, STFRANCES  
[facility] ([facility] SCHEDULING) Appt Note: med/icd/stf  
 [address] [facility] [address] [facility]  
[phone]  
  
   
 [address] [place] [address]  
  
    
 7/15/2019 10:00 AM  
ESTABLISHED PATIENT with Magdalena Hankins MD  
[facility] ([facility]) Appt Note: annual  pacer also [address] [facility] [address] [facility] [address]  
[phone]  
  
   
 [address] [facility] [address] [place] [phone]  
  
    
  
 10/9/2018 11:30 AM  
REMOTE OFFICE VISIT with Bhanu Kennedy [facility] ([facility] SCHEDULING) Appt Note: cl/icd/stf  
 [address] [facility] [address] [address] [address] [facility]  
[phone]  
  
   
 [address] [facility] [place] [address] [address] [place]  
  
    
 1/10/2019 11:15 AM  
REMOTE OFFICE VISIT with Bhanu Kennedy [facility] ([facility] SCHEDULING) Appt Note: cl/icd/stf  
 [address] [facility] [address] [address] [address] [facility]  
[phone]  
  
    
 4/11/2019 11:30 AM  
 REMOTE OFFICE VISIT with Coral Feliciano [facility] ([facility] SCHEDULING) Appt Note: cl/icd/stf  
 [address] [facility] [address] [facility]  
[phone] Upcoming Health Maintenance Date Due  
 GLAUCOMA SCREENING Q2Y 3/30/2004 Influenza Age 5 to Adult 8/1/2018 MEDICARE YEARLY EXAM 11/10/2018 DTaP/Tdap/Td series (2 - Td) 11/9/2027 Allergies as of 9/6/2018  Review Complete On: 9/6/2018 By: Abiodun Taylor, DO No Known Allergies Current Immunizations  Reviewed on 6/29/2018 Name Date Influenza High Dose Vaccine PF 11/9/2017 Influenza Vaccine (Tri) Adjuvanted  Incomplete Pneumococcal Conjugate (PCV-13) 7/13/2016 Pneumococcal Polysaccharide (PPSV-23) 11/9/2017 Zoster Vaccine, Live 10/1/2015 Not reviewed this visit You Were Diagnosed With   
  
 Codes Comments Essential hypertension    -  Primary ICD-10-CM: I10 
ICD-9-CM: 401.9 Encounter for immunization     ICD-10-CM: R95 ICD-9-CM: V03.89 Vitals BP Pulse Temp Resp Height(growth percentile) Weight(growth percentile) 120/72 (BP 1 Location: Left arm, BP Patient Position: Sitting) 60 98 °F (36.7 °C) (Oral) 14 6' 2\" (1.88 m) 183 lb 3.2 oz (83.1 kg) SpO2 BMI Smoking Status 96% 23.52 kg/m2 Never Smoker Vitals History BMI and BSA Data Body Mass Index Body Surface Area  
 23.52 kg/m 2 2.08 m 2 Preferred Pharmacy Pharmacy Name Phone [facility] [address], [address] [phone] Your Updated Medication List  
  
   
This list is accurate as of 9/6/18  2:38 PM.  Always use your most recent med list.  
  
  
  
  
 aspirin delayed-release 81 mg tablet Take 1 Tab by mouth daily. BYSTOLIC 10 mg tablet Generic drug:  nebivolol TAKE ONE TABLET BY MOUTH DAILY COMPLETE SENIOR 0.4-300-250 mg-mcg-mcg Tab Generic drug:  multivit-min-FA-lycopen-lutein Take 1 Tab by mouth daily. FISH -1,000 mg capsule Generic drug:  fish oil-omega-3 fatty acids Take 1 Cap by mouth daily. saw palmetto 160 mg Cap Take 160 mg by mouth three (3) times daily. warfarin 5 mg tablet Commonly known as:  COUMADIN Take 1 Tab by mouth nightly. We Performed the Following ADMIN INFLUENZA VIRUS VAC [ [facility]] INFLUENZA VACCINE INACTIVATED (IIV), SUBUNIT, ADJUVANTED, IM [id number] CPT(R)] Follow-up Instructions Return in about 6 weeks (around 10/18/2018), or if symptoms worsen or fail to improve. Patient Instructions Influenza (Flu) Vaccine (Inactivated or Recombinant): What You Need to Know Why get vaccinated? Influenza (\"flu\") is a contagious disease that spreads around the [place] every winter, usually between October and May. Flu is caused by influenza viruses and is spread mainly by coughing, sneezing, and close contact. Anyone can get flu. Flu strikes suddenly and can last several days. Symptoms vary by age, but can include: · Fever/chills. · Sore throat. · Muscle aches. · Fatigue. · Cough. · Headache. · Runny or stuffy nose. Flu can also lead to pneumonia and blood infections, and cause diarrhea and seizures in children. If you have a medical condition, such as heart or lung disease, flu can make it worse. Flu is more dangerous for some people. Infants and young children, people 72years of age and older, pregnant women, and people with certain health conditions or a weakened immune system are at greatest risk. Each year thousands of people in the [facility] die from flu, and many more are hospitalized. Flu vaccine can: · Keep you from getting flu. · Make flu less severe if you do get it. · Keep you from spreading flu to your family and other people. Inactivated and recombinant flu vaccines A dose of flu vaccine is recommended every flu season.  Children 6 months through 6years of age may need two doses during the same flu season. Everyone else needs only one dose each flu season. Some inactivated flu vaccines contain a very small amount of a mercury-based preservative called thimerosal. Studies have not shown thimerosal in vaccines to be harmful, but flu vaccines that do not contain thimerosal are available. There is no live flu virus in flu shots. They cannot cause the flu. There are many flu viruses, and they are always changing. Each year a new flu vaccine is made to protect against three or four viruses that are likely to cause disease in the upcoming flu season. But even when the vaccine doesn't exactly match these viruses, it may still provide some protection. Flu vaccine cannot prevent: · Flu that is caused by a virus not covered by the vaccine. · Illnesses that look like flu but are not. Some people should not get this vaccine Tell the person who is giving you the vaccine: · If you have any severe (life-threatening) allergies. If you ever had a life-threatening allergic reaction after a dose of flu vaccine, or have a severe allergy to any part of this vaccine, you may be advised not to get vaccinated. Most, but not all, types of flu vaccine contain a small amount of egg protein. · If you ever had Guillain-Barré syndrome (also called GBS) Some people with a history of GBS should not get this vaccine. This should be discussed with your doctor. · If you are not feeling well. It is usually okay to get flu vaccine when you have a mild illness, but you might be asked to come back when you feel better. Risks of a vaccine reaction With any medicine, including vaccines, there is a chance of reactions. These are usually mild and go away on their own, but serious reactions are also possible. Most people who get a flu shot do not have any problems with it. Minor problems following a flu shot include: · Soreness, redness, or swelling where the shot was given · Hoarseness · Sore, red or itchy eyes · Cough · Fever · Aches · Headache · Itching · Fatigue If these problems occur, they usually begin soon after the shot and last 1 or 2 days. More serious problems following a flu shot can include the following: · There may be a small increased risk of Guillain-Barré Syndrome (GBS) after inactivated flu vaccine. This risk has been estimated at 1 or 2 additional cases per million people vaccinated. This is much lower than the risk of severe complications from flu, which can be prevented by flu vaccine. · [place] children who get the flu shot along with pneumococcal vaccine (PCV13) and/or DTaP vaccine at the same time might be slightly more likely to have a seizure caused by fever. Ask your doctor for more information. Tell your doctor if a child who is getting flu vaccine has ever had a seizure Problems that could happen after any injected vaccine: · People sometimes faint after a medical procedure, including vaccination. Sitting or lying down for about 15 minutes can help prevent fainting, and injuries caused by a fall. Tell your doctor if you feel dizzy, or have vision changes or ringing in the ears. · Some people get severe pain in the shoulder and have difficulty moving the arm where a shot was given. This happens very rarely. · Any medication can cause a severe allergic reaction. Such reactions from a vaccine are very rare, estimated at about 1 in a million doses, and would happen within a few minutes to a few hours after the vaccination. As with any medicine, there is a very remote chance of a vaccine causing a serious injury or death. The safety of vaccines is always being monitored. For more information, visit: www.cdc.gov/vaccinesafety/. What if there is a serious reaction? What should I look for? · Look for anything that concerns you, such as signs of a severe allergic reaction, very high fever, or unusual behavior. Signs of a severe allergic reaction can include hives, swelling of the face and throat, difficulty breathing, a fast heartbeat, dizziness, and weakness - usually within a few minutes to a few hours after the vaccination. What should I do? · If you think it is a severe allergic reaction or other emergency that can't wait, call 9-1-1 and get the person to the nearest hospital. Otherwise, call your doctor. · Reactions should be reported to the \"Vaccine Adverse Event Reporting System\" (VAERS). Your doctor should file this report, or you can do it yourself through the VAERS website at www.vaers. [facility].gov, or by calling 6-634.820.6171. [employer] does not give medical advice. The National Vaccine Injury Compensation Program 
The National Vaccine Injury Compensation Program (VICP) is a federal program that was created to compensate people who may have been injured by certain vaccines. Persons who believe they may have been injured by a vaccine can learn about the program and about filing a claim by calling 1-378.960.7403 or visiting the [employer] website at www.[facility].gov/vaccinecompensation. There is a time limit to file a claim for compensation. How can I learn more? · Ask your healthcare provider. He or she can give you the vaccine package insert or suggest other sources of information. · Call your local or state health department. · Contact the Centers for Disease Control and Prevention (CDC): 
¨ Call 4-914.742.6036 (1-800-CDC-INFO) or ¨ Visit CDC's website at www.cdc.gov/flu Vaccine Information Statement Inactivated Influenza Vaccine 8/7/2015) 42 . [facility] [id number] [facility] and [employer] Centers for Disease Control and Prevention Many Vaccine Information Statements are available in Telugu and other languages. See www.immunize.org/vis. Muchas hojas de información sobre vacunas están disponibles en español y en otros idiomas. Visite www.immunize.org/vis. Care instructions adapted under license by [employer] (which disclaims liability or warranty for this information). If you have questions about a medical condition or this instruction, always ask your healthcare professional. [address] any warranty or liability for your use of this information. Please provide this summary of care documentation to your next provider. Your primary care clinician is listed as Celia Ready. If you have any questions after today's visit, please call [phone].

## 2018-09-06 NOTE — PROGRESS NOTES
Gabby Roman is a 77 y.o. male had concerns including Complete Physical. Pt here for [address] and advised he is not due until 11/10/18 and will just get a flu shot today. Coumadin is currently managed by cardiology. Pt BP remains well controlled currently on medication. Pt also recently had a watchman procedure done with Dr Mukesh Jackson and this was successful and pt is being followed for this by Dr Mukesh Jackson. he is a 77y.o. year old male who presents for evalution. Reviewed PmHx, RxHx, FmHx, SocHx, AllgHx and updated and dated in the chart. Review of Systems - negative except as listed above in the HPI Objective:  
 
Vitals:  
 09/06/18 1400 BP: 120/72 Pulse: 60 Resp: 14 Temp: 98 °F (36.7 °C) TempSrc: Oral  
SpO2: 96% Weight: 183 lb 3.2 oz (83.1 kg) Height: 6' 2\" (1.88 m) Current Outpatient Prescriptions Medication Sig  warfarin (COUMADIN) 5 mg tablet Take 1 Tab by mouth nightly.  fish oil-omega-3 fatty acids (FISH OIL) 340-1,000 mg capsule Take 1 Cap by mouth daily.  BYSTOLIC 10 mg tablet TAKE ONE TABLET BY MOUTH DAILY  saw palmetto 160 mg cap Take 160 mg by mouth three (3) times daily.  aspirin delayed-release 81 mg tablet Take 1 Tab by mouth daily.  multivit-min-FA-lycopen-lutein (COMPLETE SENIOR) 0.4-300-250 mg-mcg-mcg tab Take 1 Tab by mouth daily. No current facility-administered medications for this visit. Physical Examination: General appearance - alert, well appearing, and in no distress Chest - clear to auscultation, no wheezes, rales or rhonchi, symmetric air entry Heart - irregularly irregular rhythm with rate 60's Assessment/ Plan:  
Diagnoses and all orders for this visit: 1. Essential hypertension 
@ goal on medicaiton 2. Encounter for immunization -     Influenza Vaccine Inactivated (IIV)(FLUAD), Subunit, Adjuvanted, IM, (17417) -     Administration fee () for Medicare insured patients Follow-up Disposition: Return in about 10 weeks (around 11/15/2018), or if symptoms worsen or fail to improve. I have discussed the diagnosis with the patient and the intended plan as seen in the above orders. The patient has received an after-visit summary and questions were answered concerning future plans. Pt conveyed understanding of plan. Medication Side Effects and Warnings were discussed with patient [address] [facility], DO

## 2018-09-06 NOTE — PROGRESS NOTES
Chief Complaint Patient presents with  Complete Physical  
  Would like labs A copy of labs 1. Have you been to the ER, urgent care clinic since your last visit? Hospitalized since your last visit? No 
 
2. Have you seen or consulted any other health care providers outside of the [address] [place] [address] since your last visit? Include any pap smears or colon screening. No 
 
After obtaining consent, and per verbal order from Dr. Mannie Shields, patient received influenza vaccine given by Randy Holder LPN in Left Deltoid. Influenza Vaccine 0.5 mL IM now. Patient was observed for 10 minutes post injection. Patient tolerated injection well. VIS given.

## 2018-09-06 NOTE — PATIENT INSTRUCTIONS

## 2018-09-07 ENCOUNTER — OFFICE VISIT (OUTPATIENT)
Dept: CARDIOLOGY CLINIC | Age: 78
End: 2018-09-07

## 2018-09-07 ENCOUNTER — CLINICAL SUPPORT (OUTPATIENT)
Dept: CARDIOLOGY CLINIC | Age: 78
End: 2018-09-07

## 2018-09-07 VITALS
RESPIRATION RATE: 18 BRPM | WEIGHT: 184.8 LBS | SYSTOLIC BLOOD PRESSURE: 140 MMHG | BODY MASS INDEX: 23.72 KG/M2 | HEIGHT: 74 IN | OXYGEN SATURATION: 98 % | DIASTOLIC BLOOD PRESSURE: 72 MMHG | HEART RATE: 64 BPM

## 2018-09-07 DIAGNOSIS — I48.91 ATRIAL FIBRILLATION, UNSPECIFIED TYPE (HCC): Primary | ICD-10-CM

## 2018-09-07 DIAGNOSIS — Z95.0 CARDIAC PACEMAKER IN SITU: Primary | ICD-10-CM

## 2018-09-07 NOTE — PROGRESS NOTES
Visit Vitals    /72 (BP 1 Location: Left arm, BP Patient Position: Sitting)    Pulse 64    Resp 18    Ht 6' 2\" (1.88 m)    Wt 184 lb 12.8 oz (83.8 kg)    SpO2 98%    BMI 23.73 kg/m2

## 2018-09-07 NOTE — MR AVS SNAPSHOT
[address] [place] [address] 
[phone] Patient: Zuhair Shipman MRN: [id number] DTJ:1/39/1866 Visit Information Date & Time Provider Department Dept. Phone Encounter #  
 9/7/2018  3:20 PM Oli Daly MD [facility] [place] [phone] Your Appointments 9/10/2018 10:20 AM  
COUMADIN CLINIC with COUMADINVERONICA  
[facility] ([place] SCHEDULING) Appt Note: 2 day fu from dr hamm [address] [facility] [address] [facility] [address]  
[phone]  
  
   
 [address] [facility] [facility] [address] [place] [address] [address]  
  
    
 7/15/2019  9:15 AM  
PACEMAKER with PACEMAKERRAY  
[facility] ([place] SCHEDULING) Appt Note: med/icd/stf  
 [address] [facility] [address] [address] [facility]  
[phone]  
  
   
 [address] [facility] [address]  
  
    
 7/15/2019 10:00 AM  
ESTABLISHED PATIENT with Oli Daly MD  
[facility] ([facility]) Appt Note: annual  pacer also [address] [facility] [address]  
[phone]  
  
   
 [address] [facility] [address] [place] [address]  
  
    
  
 10/9/2018 11:30 AM  
REMOTE OFFICE VISIT with Ayad Mahmood [facility] ([facility] SCHEDULING) Appt Note: cl/icd/stf  
 [address] [address] [facility]  
[phone]  
  
   
 [address] [facility] [address] [address] [address]  
  
    
 1/10/2019 11:15 AM  
REMOTE OFFICE VISIT with Ayad Mahmood [facility] ([facility] SCHEDULING) Appt Note: cl/icd/stf  
 [address] [facility] [address] [place] [address] [facility]  
[phone]  
  
    
 4/11/2019 11:30 AM  
REMOTE OFFICE VISIT with Ayad Mahmood  
 [facility] ([facility]) Appt Note: cl/icd/stf  
 [address] [facility] [address] [address] [facility]  
[phone] Upcoming Health Maintenance Date Due  
 GLAUCOMA SCREENING Q2Y 3/30/2004 MEDICARE YEARLY EXAM 11/10/2018 DTaP/Tdap/Td series (2 - Td) 11/9/2027 Allergies as of 9/7/2018  Review Complete On: 9/7/2018 By: Amparo Robertson NP No Known Allergies Current Immunizations  Reviewed on 6/29/2018 Name Date Influenza High Dose Vaccine PF 11/9/2017 Influenza Vaccine (Tri) Adjuvanted 9/6/2018 Pneumococcal Conjugate (PCV-13) 7/13/2016 Pneumococcal Polysaccharide (PPSV-23) 11/9/2017 Zoster Vaccine, Live 10/1/2015 Not reviewed this visit You Were Diagnosed With   
  
 Codes Comments Atrial fibrillation, unspecified type ([facility]    -  Primary ICD-10-CM: I48.91 
ICD-9-CM: 427.31 Vitals BP Pulse Resp Height(growth percentile) Weight(growth percentile) SpO2  
 140/72 (BP 1 Location: Left arm, BP Patient Position: Sitting) 64 18 6' 2\" (1.88 m) 184 lb 12.8 oz (83.8 kg) 98% BMI Smoking Status 23.73 kg/m2 Never Smoker Vitals History BMI and BSA Data Body Mass Index Body Surface Area  
 23.73 kg/m 2 2.09 m 2 Preferred Pharmacy Pharmacy Name Phone Sonja Sanchez [address], [address] [phone] Your Updated Medication List  
  
   
This list is accurate as of 9/7/18  3:43 PM.  Always use your most recent med list.  
  
  
  
  
 aspirin delayed-release 81 mg tablet Take 1 Tab by mouth daily. BYSTOLIC 10 mg tablet Generic drug:  nebivolol TAKE ONE TABLET BY MOUTH DAILY COMPLETE SENIOR 0.4-300-250 mg-mcg-mcg Tab Generic drug:  multivit-min-FA-lycopen-lutein Take 1 Tab by mouth daily. FISH -1,000 mg capsule Generic drug:  fish oil-omega-3 fatty acids Take 1 Cap by mouth daily. saw palmetto 160 mg Cap Take 160 mg by mouth three (3) times daily. warfarin 5 mg tablet Commonly known as:  COUMADIN Take 1 Tab by mouth nightly. We Performed the Following AMB POC EKG ROUTINE W/ 12 LEADS, INTER & REP [81523 CPT(R)] Please provide this summary of care documentation to your next provider. Your primary care clinician is listed as Sweetie Pinto. If you have any questions after today's visit, please call [phone].

## 2018-09-07 NOTE — PROGRESS NOTES
HISTORY OF PRESENTING ILLNESS      Domingo Tejeda is a 77 y.o. male with hypertension, long-standing persistent atrial fibrillation, single-chamber PPM who has been intolerant of aspirin in the past due to bruising/bleeding and underwent left atrial appendage occlusion and now presents for follow-up. He denies groin pain, chest pain, syncope. He is tolerating his Coumadin thus far. ACTIVE PROBLEM LIST     Patient Active Problem List    Diagnosis Date Noted    Atrial fibrillation ([facility] 08/16/2018    Anticoagulant drug declined 07/16/2018    ACP (advance care planning) 07/13/2016    Pacemaker 11/02/2015    Essential hypertension 07/20/2015    Chronic atrial fibrillation ([facility] 07/20/2015           PAST MEDICAL HISTORY     Past Medical History:   Diagnosis Date    A-[facility])     discovered 2008    Arthritis     Knees    Hypertension     Pacemaker     placed  Nov 2011 ([address])    Sick sinus syndrome ([place] [address]     heart monitor showed long pauses (> 4 sec) in 2011           PAST SURGICAL HISTORY     Past Surgical History:   Procedure Laterality Date    HX HERNIA REPAIR      HX PACEMAKER            ALLERGIES     No Known Allergies       FAMILY HISTORY     Family History   Problem Relation Age of Onset    Diabetes Maternal Aunt     Hypertension Mother     Dementia Father     negative for cardiac disease       SOCIAL HISTORY     Social History     Social History    Marital status:      Spouse name: N/A    Number of children: N/A    Years of education: N/A     Social History Main Topics    Smoking status: Never Smoker    Smokeless tobacco: Never Used    Alcohol use No    Drug use: No    Sexual activity: Not Asked     Other Topics Concern    None     Social History Narrative         MEDICATIONS     Current Outpatient Prescriptions   Medication Sig    warfarin (COUMADIN) 5 mg tablet Take 1 Tab by mouth nightly.     fish oil-omega-3 fatty acids (FISH OIL) 340-1,000 mg capsule Take 1 Cap by mouth daily.  BYSTOLIC 10 mg tablet TAKE ONE TABLET BY MOUTH DAILY    saw palmetto 160 mg cap Take 160 mg by mouth three (3) times daily.  aspirin delayed-release 81 mg tablet Take 1 Tab by mouth daily.  multivit-min-FA-lycopen-lutein (COMPLETE SENIOR) 0.4-300-250 mg-mcg-mcg tab Take 1 Tab by mouth daily. No current facility-administered medications for this visit. I have reviewed the nurses notes, vitals, problem list, allergy list, medical history, family, social history and medications. REVIEW OF SYMPTOMS      General: Pt denies excessive weight gain or loss. Pt is able to conduct ADL's  HEENT: Denies blurred vision, headaches, hearing loss, epistaxis and difficulty swallowing. Respiratory: Denies cough, congestion, shortness of breath, LOMELI, wheezing or stridor. Cardiovascular: Denies precordial pain, palpitations, edema or PND  Gastrointestinal: Denies poor appetite, indigestion, abdominal pain or blood in stool  Genitourinary: Denies hematuria, dysuria, increased urinary frequency  Musculoskeletal: Denies joint pain or swelling from muscles or joints  Neurologic: Denies tremor, paresthesias, headache, or sensory motor disturbance  Psychiatric: Denies confusion, insomnia, depression  Integumentray: Denies rash, itching or ulcers. Hematologic: Denies easy bruising, bleeding       PHYSICAL EXAMINATION      Vitals:    09/07/18 1505   BP: 140/72   Pulse: 64   Resp: 18   SpO2: 98%   Weight: 184 lb 12.8 oz (83.8 kg)   Height: 6' 2\" (1.88 m)     General: Well developed, in no acute distress. HEENT: No jaundice, oral mucosa moist, no oral ulcers  Neck: Supple, no stiffness, no lymphadenopathy, supple  Heart:  Normal S1/S2 negative S3 or S4.  Regular, no murmur, gallop or rub, no jugular venous distention  Respiratory: Clear bilaterally x 4, no wheezing or rales  Abdomen:   Soft, non-tender, bowel sounds are active.   Extremities:  No edema, normal cap refill, no cyanosis. Musculoskeletal: No clubbing, no deformities  Neuro: A&Ox3, speech clear, gait stable, cooperative, no focal neurologic deficits  Skin: Skin color is normal. No rashes or lesions. Non diaphoretic, moist.  Vascular: 2+ pulses symmetric in all extremities       DIAGNOSTIC DATA      EKG:        LABORATORY DATA      Lab Results   Component Value Date/Time    WBC 5.6 08/06/2018 08:13 AM    HGB 15.2 08/06/2018 08:13 AM    HCT 45.1 08/06/2018 08:13 AM    PLATELET 333 90/07/4817 08:13 AM    MCV 95.1 08/06/2018 08:13 AM      Lab Results   Component Value Date/Time    Sodium 137 08/06/2018 08:13 AM    Potassium 3.9 08/06/2018 08:13 AM    Chloride 101 08/06/2018 08:13 AM    CO2 27 08/06/2018 08:13 AM    Anion gap 9 08/06/2018 08:13 AM    Glucose 183 (H) 08/06/2018 08:13 AM    BUN 26 (H) 08/06/2018 08:13 AM    Creatinine 1.26 08/06/2018 08:13 AM    BUN/Creatinine ratio 21 (H) 08/06/2018 08:13 AM    GFR est AA >60 08/06/2018 08:13 AM    GFR est non-AA 55 (L) 08/06/2018 08:13 AM    Calcium 8.9 08/06/2018 08:13 AM    Bilirubin, total 1.9 (H) 11/09/2017 08:24 AM    AST (SGOT) 26 11/09/2017 08:24 AM    Alk. phosphatase 85 11/09/2017 08:24 AM    Protein, total 7.0 11/09/2017 08:24 AM    Albumin 4.5 11/09/2017 08:24 AM    A-G Ratio 1.8 11/09/2017 08:24 AM    ALT (SGPT) 22 11/09/2017 08:24 AM           ASSESSMENT      1. Atrial fibrillation              A. Long-standing persistent              B. Poorly tolerant of antiplatelet therapy  2. PPM                A. Single chamber              B. Challenge Sci  3. Lead malfunction              A. Insulation breath       PLAN     Follow-up SUSHMA for 45 days post [employer], [address] [facility] [address], DO for allowing me to participate in the care of this extraordinarily pleasant male. Please do not hesitate to contact me for further questions/concerns.          Tristen Bernabe MD  Cardiac Electrophysiology / Cardiology    [address] [facility] [address], [address], [address] [place] [address], [facility]  [phone] / [phone] Fax   [phone] / [phone] Fax

## 2018-09-10 ENCOUNTER — CLINICAL SUPPORT (OUTPATIENT)
Dept: CARDIOLOGY CLINIC | Age: 78
End: 2018-09-10

## 2018-09-10 DIAGNOSIS — I48.91 ATRIAL FIBRILLATION, UNSPECIFIED TYPE (HCC): ICD-10-CM

## 2018-09-10 DIAGNOSIS — Z79.01 LONG TERM (CURRENT) USE OF ANTICOAGULANTS: Primary | ICD-10-CM

## 2018-09-10 DIAGNOSIS — I48.20 CHRONIC ATRIAL FIBRILLATION (HCC): ICD-10-CM

## 2018-09-10 LAB
INR BLD: 2
INR, EXTERNAL: 2 (ref 2–3)
PT POC: 24.4 SECONDS
VALID INTERNAL CONTROL?: YES

## 2018-09-13 ENCOUNTER — TELEPHONE (OUTPATIENT)
Dept: CARDIOLOGY CLINIC | Age: 78
End: 2018-09-13

## 2018-09-17 ENCOUNTER — CLINICAL SUPPORT (OUTPATIENT)
Dept: CARDIOLOGY CLINIC | Age: 78
End: 2018-09-17

## 2018-09-17 DIAGNOSIS — Z79.01 LONG TERM (CURRENT) USE OF ANTICOAGULANTS: Primary | ICD-10-CM

## 2018-09-17 DIAGNOSIS — I48.91 ATRIAL FIBRILLATION, UNSPECIFIED TYPE (HCC): ICD-10-CM

## 2018-09-17 DIAGNOSIS — I48.20 CHRONIC ATRIAL FIBRILLATION (HCC): ICD-10-CM

## 2018-09-17 LAB
INR BLD: 1.4
INR, EXTERNAL: 1.4 (ref 2–3)
PT POC: 17.3 SECONDS
VALID INTERNAL CONTROL?: YES

## 2018-09-21 ENCOUNTER — TELEPHONE (OUTPATIENT)
Dept: CARDIOLOGY CLINIC | Age: 78
End: 2018-09-21

## 2018-09-21 NOTE — TELEPHONE ENCOUNTER
Call returned. Voicemail message left. Patient should make sure he has enough coumadin untile 10/1. If needed can reorder at that time.

## 2018-09-21 NOTE — TELEPHONE ENCOUNTER
Patient stated he is to have a procedure 10/1 but he wants to know if he should refill his warfrin or not.   Phone: [phone]

## 2018-09-24 ENCOUNTER — CLINICAL SUPPORT (OUTPATIENT)
Dept: CARDIOLOGY CLINIC | Age: 78
End: 2018-09-24

## 2018-09-24 DIAGNOSIS — I48.20 CHRONIC ATRIAL FIBRILLATION (HCC): ICD-10-CM

## 2018-09-24 DIAGNOSIS — I48.91 ATRIAL FIBRILLATION, UNSPECIFIED TYPE (HCC): ICD-10-CM

## 2018-09-24 DIAGNOSIS — Z79.01 LONG TERM (CURRENT) USE OF ANTICOAGULANTS: Primary | ICD-10-CM

## 2018-09-24 LAB
INR BLD: 2.1
INR, EXTERNAL: 2.1 (ref 2–3)
PT POC: 25.6 SECONDS
VALID INTERNAL CONTROL?: YES

## 2018-09-28 RX ORDER — SODIUM CHLORIDE 0.9 % (FLUSH) 0.9 %
5-10 SYRINGE (ML) INJECTION EVERY 8 HOURS
Status: CANCELLED | OUTPATIENT
Start: 2018-09-28

## 2018-09-28 RX ORDER — SODIUM CHLORIDE 0.9 % (FLUSH) 0.9 %
5-10 SYRINGE (ML) INJECTION AS NEEDED
Status: CANCELLED | OUTPATIENT
Start: 2018-09-28

## 2018-10-01 ENCOUNTER — HOSPITAL ENCOUNTER (OUTPATIENT)
Dept: NON INVASIVE DIAGNOSTICS | Age: 78
Discharge: HOME | End: 2018-10-01
Attending: INTERNAL MEDICINE | Admitting: INTERNAL MEDICINE
Payer: MEDICARE

## 2018-10-01 VITALS
HEART RATE: 60 BPM | OXYGEN SATURATION: 97 % | RESPIRATION RATE: 17 BRPM | HEIGHT: 74 IN | SYSTOLIC BLOOD PRESSURE: 125 MMHG | TEMPERATURE: 97.5 F | WEIGHT: 178.35 LBS | BODY MASS INDEX: 22.89 KG/M2 | DIASTOLIC BLOOD PRESSURE: 68 MMHG

## 2018-10-01 LAB — INR BLD: 1.4 (ref 0.9–1.2)

## 2018-10-01 PROCEDURE — 99152 MOD SED SAME PHYS/QHP 5/>YRS: CPT | Performed by: INTERNAL MEDICINE

## 2018-10-01 PROCEDURE — 74011250636 HC RX REV CODE- 250/636: Performed by: INTERNAL MEDICINE

## 2018-10-01 PROCEDURE — 93325 DOPPLER ECHO COLOR FLOW MAPG: CPT

## 2018-10-01 PROCEDURE — 74011000250 HC RX REV CODE- 250: Performed by: INTERNAL MEDICINE

## 2018-10-01 PROCEDURE — 85610 PROTHROMBIN TIME: CPT

## 2018-10-01 RX ORDER — ASPIRIN 81 MG/1
81 TABLET ORAL DAILY
Qty: 60 TAB | Refills: 0 | Status: SHIPPED
Start: 2018-10-01

## 2018-10-01 RX ORDER — PANTOPRAZOLE SODIUM 20 MG/1
40 TABLET, DELAYED RELEASE ORAL DAILY
Qty: 30 TAB | Refills: 6 | Status: SHIPPED | OUTPATIENT
Start: 2018-10-01 | End: 2018-11-09

## 2018-10-01 RX ORDER — CLOPIDOGREL BISULFATE 75 MG/1
75 TABLET ORAL DAILY
Qty: 30 TAB | Refills: 6 | Status: SHIPPED | OUTPATIENT
Start: 2018-10-01 | End: 2018-11-09

## 2018-10-01 RX ORDER — LIDOCAINE HYDROCHLORIDE 20 MG/ML
JELLY TOPICAL ONCE
Status: COMPLETED | OUTPATIENT
Start: 2018-10-01 | End: 2018-10-01

## 2018-10-01 RX ORDER — LIDOCAINE 50 MG/G
OINTMENT TOPICAL AS NEEDED
Status: DISCONTINUED | OUTPATIENT
Start: 2018-10-01 | End: 2018-10-01 | Stop reason: HOSPADM

## 2018-10-01 RX ORDER — DIPHENHYDRAMINE HYDROCHLORIDE 50 MG/ML
25-50 INJECTION, SOLUTION INTRAMUSCULAR; INTRAVENOUS ONCE
Status: COMPLETED | OUTPATIENT
Start: 2018-10-01 | End: 2018-10-01

## 2018-10-01 RX ORDER — MIDAZOLAM HYDROCHLORIDE 1 MG/ML
.5-1 INJECTION, SOLUTION INTRAMUSCULAR; INTRAVENOUS
Status: DISCONTINUED | OUTPATIENT
Start: 2018-10-01 | End: 2018-10-01 | Stop reason: HOSPADM

## 2018-10-01 RX ORDER — SODIUM CHLORIDE 0.9 % (FLUSH) 0.9 %
5-10 SYRINGE (ML) INJECTION AS NEEDED
Status: DISCONTINUED | OUTPATIENT
Start: 2018-10-01 | End: 2018-10-01 | Stop reason: HOSPADM

## 2018-10-01 RX ORDER — SODIUM CHLORIDE 0.9 % (FLUSH) 0.9 %
5-10 SYRINGE (ML) INJECTION EVERY 8 HOURS
Status: DISCONTINUED | OUTPATIENT
Start: 2018-10-01 | End: 2018-10-01 | Stop reason: HOSPADM

## 2018-10-01 RX ORDER — FENTANYL CITRATE 50 UG/ML
25-200 INJECTION, SOLUTION INTRAMUSCULAR; INTRAVENOUS
Status: DISCONTINUED | OUTPATIENT
Start: 2018-10-01 | End: 2018-10-01 | Stop reason: HOSPADM

## 2018-10-01 RX ORDER — LIDOCAINE HYDROCHLORIDE 20 MG/ML
15 SOLUTION OROPHARYNGEAL AS NEEDED
Status: DISCONTINUED | OUTPATIENT
Start: 2018-10-01 | End: 2018-10-01 | Stop reason: HOSPADM

## 2018-10-01 RX ADMIN — LIDOCAINE HYDROCHLORIDE 15 ML: 20 SOLUTION ORAL; TOPICAL at 10:44

## 2018-10-01 RX ADMIN — LIDOCAINE: 50 OINTMENT TOPICAL at 10:45

## 2018-10-01 RX ADMIN — DIPHENHYDRAMINE HYDROCHLORIDE 25 MG: 50 INJECTION, SOLUTION INTRAMUSCULAR; INTRAVENOUS at 10:45

## 2018-10-01 RX ADMIN — MIDAZOLAM HYDROCHLORIDE 1 MG: 1 INJECTION, SOLUTION INTRAMUSCULAR; INTRAVENOUS at 10:55

## 2018-10-01 RX ADMIN — FENTANYL CITRATE 25 MCG: 50 INJECTION, SOLUTION INTRAMUSCULAR; INTRAVENOUS at 10:54

## 2018-10-01 RX ADMIN — LIDOCAINE HYDROCHLORIDE: 20 JELLY TOPICAL at 10:55

## 2018-10-01 RX ADMIN — FENTANYL CITRATE 25 MCG: 50 INJECTION, SOLUTION INTRAMUSCULAR; INTRAVENOUS at 10:55

## 2018-10-01 RX ADMIN — MIDAZOLAM HYDROCHLORIDE 2 MG: 1 INJECTION, SOLUTION INTRAMUSCULAR; INTRAVENOUS at 10:54

## 2018-10-01 RX ADMIN — BENZOCAINE 1 SPRAY: 200 SPRAY DENTAL; ORAL; PERIODONTAL at 10:52

## 2018-10-01 NOTE — PROCEDURES
Cardiac Electrophysiology Report        PATIENT INFORMATION     Patient Name: Be Urrutia   MRN: [id number]                  Study Date: 10/1/2018    YOB: 1940    Age: 77 y.o. Gender: male      Procedure:  Transesophageal EchocardiogramRa    Referring Physician:  Nika Teresa DO        STAFF     Duty Name   Electrophysiologist Crystal iN MD   Monitor Mayi Garcia RN   Circulator Crista Zapien RN; Tony Casarez, RT       PATIENT HISTORY     Belinda Bishop is a 77 y. o. male with hypertension, long-standing persistent atrial fibrillation, single-chamber PPM who has been intolerant of aspirin in the past due to bruising/bleeding and underwent left atrial appendage occlusion and now presents for SUSHMA evaluation of his WATCHMAN device.         PROCEDURE     The patient was brought to the Cardiac Electrophysiology laboratory in a post-absorptive, fasting state. Informed consent was obtained. A peripheral IV was in place. Continuous electrocardiographic, blood pressure, O2 saturation and  CO2 monitoring was initiated. Once conscious sedation was achieved, a multi-planar lubricated SUSHMA probe was advanced to the oropharynx and into the esophagus without difficulty. Limited views were obtained visualizing the left atrium and left atrial appendage. The emptying velocity of the LA appendage was 0. m/s. The left atrial appendage was visualized in multiple views and demonstrated the LA appendage was free of visualized thrombus. The SUSHMA probe was then removed without difficulty. The patient remained hemodynamically stable, tolerated the procedure well and was transferred in stable condition. There were no immediate complications encountered during the procedure. MEDICATION SUMMARY     See anesthesia report      CONCLUSIONS     1. Limited SUSHMA demonstrating the WATCHMAN device is well seated without flow leaks. 2.  Discontinue Coumadin  3.   Start aspirin 81 mg daily and Plavix 75 mg daily  4. Start PPI  5.   Follow-up 6 months           Brianda Sanchez MD  Cardiac Electrophysiology / Cardiology    [address], [address] [facility], [address]  Emigdio Gallegos [place]  [phone] / [phone] Fax [phone] / [phone] Fax

## 2018-10-01 NOTE — H&P
HISTORY OF PRESENTING ILLNESS  
   
Mariann Sampson is a 77 y.o. male with hypertension, long-standing persistent atrial fibrillation, single-chamber PPM who has been intolerant of aspirin in the past due to bruising/bleeding and underwent left atrial appendage occlusion and now presents for follow-up. He denies groin pain, chest pain, syncope. He is tolerating his Coumadin thus far. 
  
  
 ACTIVE PROBLEM LIST  
  
    
Patient Active Problem List  
  Diagnosis Date Noted  Atrial fibrillation ([facility] 08/16/2018  Anticoagulant drug declined 07/16/2018  ACP (advance care planning) 07/13/2016  Pacemaker 11/02/2015  Essential hypertension 07/20/2015  Chronic atrial fibrillation ([facility] 07/20/2015  
  
  
  
 PAST MEDICAL HISTORY  
  
    
Past Medical History:  
Diagnosis Date  A-[facility])    
  discovered 2008  Arthritis    
  Knees  Hypertension    
 Pacemaker    
  placed  Nov 2011 ([address])  Sick sinus syndrome ([facility])    
  heart monitor showed long pauses (> 4 sec) in 2011  
  
  
  
 PAST SURGICAL HISTORY  
  
     
Past Surgical History:  
Procedure Laterality Date  HX HERNIA REPAIR      
 HX PACEMAKER      
  
  
  
 ALLERGIES  
  
No Known Allergies  
  
  
FAMILY HISTORY  
  
     
Family History Problem Relation Age of Onset  Diabetes Maternal Aunt    
 Hypertension Mother    
 Dementia Father    
 negative for cardiac disease 
  
  
 SOCIAL HISTORY  
  
 Social History  
  
   
     
Social History  Marital status:   
    Spouse name: N/A  
 Number of children: N/A  
 Years of education: N/A  
  
Social History Main Topics  Smoking status: Never Smoker  Smokeless tobacco: Never Used  Alcohol use No  
 Drug use: No  
 Sexual activity: Not Asked  
  
    
Other Topics Concern  None  
  
Social History Narrative   
  
  
MEDICATIONS  
  
    
Current Outpatient Prescriptions Medication Sig  
  warfarin (COUMADIN) 5 mg tablet Take 1 Tab by mouth nightly.  fish oil-omega-3 fatty acids (FISH OIL) 340-1,000 mg capsule Take 1 Cap by mouth daily.  BYSTOLIC 10 mg tablet TAKE ONE TABLET BY MOUTH DAILY  saw palmetto 160 mg cap Take 160 mg by mouth three (3) times daily.  aspirin delayed-release 81 mg tablet Take 1 Tab by mouth daily.  multivit-min-FA-lycopen-lutein (COMPLETE SENIOR) 0.4-300-250 mg-mcg-mcg tab Take 1 Tab by mouth daily.  
  
No current facility-administered medications for this visit.   
  
  
I have reviewed the nurses notes, vitals, problem list, allergy list, medical history, family, social history and medications. 
  
  
 REVIEW OF SYMPTOMS  
   
General: Pt denies excessive weight gain or loss. Pt is able to conduct ADL's HEENT: Denies blurred vision, headaches, hearing loss, epistaxis and difficulty swallowing. Respiratory: Denies cough, congestion, shortness of breath, LOMELI, wheezing or stridor. Cardiovascular: Denies precordial pain, palpitations, edema or PND Gastrointestinal: Denies poor appetite, indigestion, abdominal pain or blood in stool Genitourinary: Denies hematuria, dysuria, increased urinary frequency Musculoskeletal: Denies joint pain or swelling from muscles or joints Neurologic: Denies tremor, paresthesias, headache, or sensory motor disturbance Psychiatric: Denies confusion, insomnia, depression Integumentray: Denies rash, itching or ulcers. Hematologic: Denies easy bruising, bleeding 
  
  
 PHYSICAL EXAMINATION  
   
   
Vitals:  
  09/07/18 1505 BP: 140/72 Pulse: 64 Resp: 18 SpO2: 98% Weight: 184 lb 12.8 oz (83.8 kg) Height: 6' 2\" (1.88 m)  
  
General: Well developed, in no acute distress. HEENT: No jaundice, oral mucosa moist, no oral ulcers Neck: Supple, no stiffness, no lymphadenopathy, supple Heart:  Normal S1/S2 negative S3 or S4. Regular, no murmur, gallop or rub, no jugular venous distention Respiratory: Clear bilaterally x 4, no wheezing or rales Abdomen:   Soft, non-tender, bowel sounds are active.  
Extremities:  No edema, normal cap refill, no cyanosis. Musculoskeletal: No clubbing, no deformities Neuro: A&Ox3, speech clear, gait stable, cooperative, no focal neurologic deficits Skin: Skin color is normal. No rashes or lesions. Non diaphoretic, moist. 
Vascular: 2+ pulses symmetric in all extremities 
  
  
 DIAGNOSTIC DATA  
   
EKG:  
  
  
 LABORATORY DATA  
   
     
Lab Results Component Value Date/Time  
  WBC 5.6 08/06/2018 08:13 AM  
  HGB 15.2 08/06/2018 08:13 AM  
  HCT 45.1 08/06/2018 08:13 AM  
  PLATELET 853 64/16/8930 08:13 AM  
  MCV 95.1 08/06/2018 08:13 AM  
  
     
Lab Results Component Value Date/Time  
  Sodium 137 08/06/2018 08:13 AM  
  Potassium 3.9 08/06/2018 08:13 AM  
  Chloride 101 08/06/2018 08:13 AM  
  CO2 27 08/06/2018 08:13 AM  
  Anion gap 9 08/06/2018 08:13 AM  
  Glucose 183 (H) 08/06/2018 08:13 AM  
  BUN 26 (H) 08/06/2018 08:13 AM  
  Creatinine 1.26 08/06/2018 08:13 AM  
  BUN/Creatinine ratio 21 (H) 08/06/2018 08:13 AM  
  GFR est AA >60 08/06/2018 08:13 AM  
  GFR est non-AA 55 (L) 08/06/2018 08:13 AM  
  Calcium 8.9 08/06/2018 08:13 AM  
  Bilirubin, total 1.9 (H) 11/09/2017 08:24 AM  
  AST (SGOT) 26 11/09/2017 08:24 AM  
  Alk. phosphatase 85 11/09/2017 08:24 AM  
  Protein, total 7.0 11/09/2017 08:24 AM  
  Albumin 4.5 11/09/2017 08:24 AM  
  A-G Ratio 1.8 11/09/2017 08:24 AM  
  ALT (SGPT) 22 11/09/2017 08:24 AM  
  
  
  
 ASSESSMENT  
   
1. Atrial fibrillation 
            A. Long-standing persistent 
            B. Poorly tolerant of antiplatelet therapy 2. PPM   
            A. Single chamber 
            B. [employer] 3. Lead malfunction             Q.  Insulation breath 
  
  
 PLAN  
  
 SUSHMA 
  
  
  
Thank you, Angelito Davies DO for allowing me to participate in the care of this extraordinarily pleasant male. Please do not hesitate to contact me for further questions/concerns.  
  
  
  
Betito Muñoz MD 
Cardiac Electrophysiology / Cardiology 
  
[facility] [address] [facility] 
[address] [place] [address], [address] [place] [address] Rd, [address] 
[phone] / [phone] Fax                                                                  [phone] / [phone] Fax

## 2018-10-01 NOTE — IP AVS SNAPSHOT
[address] 
[phone] Patient: Kerline Corea MRN: [id number] CHUCKIE:0[birth year]94/[birth year]36 A check abida indicates which time of day the medication should be taken. My Medications START taking these medications Instructions Each Dose to Equal  
 Morning Noon Evening Bedtime  
 aspirin delayed-release 81 mg tablet Your last dose was: Your next dose is: Take 1 Tab by mouth daily. 81 mg  
    
   
   
   
  
 clopidogrel 75 mg Tab Commonly known as:  PLAVIX Your last dose was: Your next dose is: Take 1 Tab by mouth daily. 75 mg  
    
   
   
   
  
 pantoprazole 20 mg tablet Commonly known as:  PROTONIX Your last dose was: Your next dose is: Take 2 Tabs by mouth daily. 40 mg CONTINUE taking these medications Instructions Each Dose to Equal  
 Morning Noon Evening Bedtime BYSTOLIC 10 mg tablet Generic drug:  nebivolol Your last dose was: Your next dose is: TAKE ONE TABLET BY MOUTH DAILY FISH -1,000 mg capsule Generic drug:  fish oil-omega-3 fatty acids Your last dose was: Your next dose is: Take 1 Cap by mouth daily. 1 Cap  
    
   
   
   
  
 saw palmetto 160 mg Cap Your last dose was: Your next dose is: Take 160 mg by mouth three (3) times daily. 160 mg  
    
   
   
   
  
  
STOP taking these medications COMPLETE SENIOR 0.4-300-250 mg-mcg-mcg Tab Generic drug:  multivit-min-FA-lycopen-lutein  
   
  
 warfarin 5 mg tablet Commonly known as:  COUMADIN Where to Get Your Medications These medications were sent to [facility] [address] , [address]   [address] [place] [address] Phone:  [phone]  
  clopidogrel 75 mg Tab  
 pantoprazole 20 mg tablet Information on where to get these meds will be given to you by the nurse or doctor. ! Ask your nurse or doctor about these medications  
  aspirin delayed-release 81 mg tablet

## 2018-10-01 NOTE — PROGRESS NOTES
Patient arrived. ID and allergies verified verbally with patient. Pt voices understanding of procedure to be performed. Consent obtained. Pt prepped for procedure. INR is 1.4. TRANSFER - OUT REPORT: 
 
Verbal report given to CARLA Lopez(name) on [facility]  being transferred to (unit) for routine progression of care Report consisted of patients Situation, Background, Assessment and  
Recommendations(SBAR). Information from the following report(s) Procedure Summary was reviewed with the receiving nurse. Lines:  
Peripheral IV 10/01/18 Left Forearm (Active) Site Assessment Clean, dry, & intact 10/1/2018 10:02 AM  
  
 
Opportunity for questions and clarification was provided. Patient transported with: 
 Registered Nurse TRANSFER - IN REPORT: 
 
Verbal report received from CARLA Lopez(name) on [facility]  being received from (unit) for routine progression of care Report consisted of patients Situation, Background, Assessment and  
Recommendations(SBAR). Information from the following report(s) Procedure Summary was reviewed with the receiving nurse. Opportunity for questions and clarification was provided. Assessment completed upon patients arrival to unit and care assumed. 12:30,ambulated to  with assistance. Discharge instructions reviewed with pt and wife. 12:45,pt discharged via [facility].

## 2018-10-01 NOTE — IP AVS SNAPSHOT
[address] [facility] 
 
 
 [address] 
[phone] Patient: Mariann Sampson MRN: [id number]:[id number] About your hospitalization You were admitted on:  October 1, 2018 You last received care in the:  [facility] PACU You were discharged on:  October 1, 2018 Why you were hospitalized Your primary diagnosis was:  Not on File Follow-up Information Follow up With Details Comments Contact Info Eli Gomez DO   [address] [address] 
[phone] Your Scheduled Appointments Monday October 01, 2018 12:00 PM EDT  
EP 75 with CATH EP ROOM [facility], CATH EP ROOM [facility] VIRTUAL  
[facility] ELECTROPHYSIOLOGY ([address] [place] [address] Dr) [address]  
[phone] NPO AFTER MIDNIGHT! ROUTINE CASES:  Please arrive 2 hour prior to your scheduled appointment time. If your scheduled appointment is for 0730, 0800, 0815, please arrive by 0645. NON ROUTINE CASES:  PATIENTS WHO REQUIRE LABS, X-RAY, EKG, or MEDS:  PLEASE ARRIVE 3 HOURS PRIOR TO YOUR SCHEDULED APPOINTMENT. If you require hydration prior to your procedure, PLEASE ARRIVE 4 HOURS PRIOR TO YOUR APPOINTMENT  **** IT IS THE OFFICE SCHEDULARS RESPONSBILITY TO NOTIFY THE CATH LAB SCHEDULAR IF THE PATIENT WILL REQUIRE ANY ADDITIONAL TIME FOR PREP FROM ROUTINE CASE ***** Park in designated visitor/patient parking. Enter through the main entrance, which is just to the left of the fountain. Once inside, go around the corner to the left. You will register in Outpatient Registration. Tuesday October 09, 2018 11:30 AM EDT  
REMOTE OFFICE VISIT with Doc Galileo [facility] SCHEDUL[facility]) 320 [facility] 600 1900 [facility]  
[phone] Discharge Orders None A check abida indicates which time of day the medication should be taken. My Medications START taking these medications Instructions Each Dose to Equal  
 Morning Noon Evening Bedtime  
 aspirin delayed-release 81 mg tablet Your last dose was: Your next dose is: Take 1 Tab by mouth daily. 81 mg  
    
   
   
   
  
 clopidogrel 75 mg Tab Commonly known as:  PLAVIX Your last dose was: Your next dose is: Take 1 Tab by mouth daily. 75 mg  
    
   
   
   
  
 pantoprazole 20 mg tablet Commonly known as:  PROTONIX Your last dose was: Your next dose is: Take 2 Tabs by mouth daily. 40 mg CONTINUE taking these medications Instructions Each Dose to Equal  
 Morning Noon Evening Bedtime BYSTOLIC 10 mg tablet Generic drug:  nebivolol Your last dose was: Your next dose is: TAKE ONE TABLET BY MOUTH DAILY FISH -1,000 mg capsule Generic drug:  fish oil-omega-3 fatty acids Your last dose was: Your next dose is: Take 1 Cap by mouth daily. 1 Cap  
    
   
   
   
  
 saw palmetto 160 mg Cap Your last dose was: Your next dose is: Take 160 mg by mouth three (3) times daily. 160 mg  
    
   
   
   
  
  
STOP taking these medications COMPLETE SENIOR 0.4-300-250 mg-mcg-mcg Tab Generic drug:  multivit-min-FA-lycopen-lutein  
   
  
 warfarin 5 mg tablet Commonly known as:  COUMADIN Where to Get Your Medications These [facility] [address] [place] [address] Phone:  [phone]  
  clopidogrel 75 mg Tab  
 pantoprazole 20 mg tablet Information on where to get these meds will be given to you by the nurse or doctor. ! Ask your nurse or doctor about these medications  
  aspirin delayed-release 81 mg tablet Discharge Instructions Transesophageal Echocardiogram: What to Expect at [facility] Your Recovery A transesophageal echocardiogram is a test to help your doctor look at the inside of your heart. A small device called a transducer directs sound waves toward your heart. The sound waves make a picture of the heart's valves and chambers. Before the test, your throat was sprayed with medicine to numb it. You won't be able to eat or drink until the numbness wears off. Your throat may be sore for a few days. You may have had a sedative to help you relax. You may be unsteady after having sedation. It can take a few hours for the medicine's effects to wear off. Common side effects include nausea, vomiting, and feeling sleepy or tired. This care sheet gives you a general idea about how long it will take for you to recover. But each person recovers at a different pace. Follow the steps below to feel better as quickly as possible. How can you care for yourself at home? Activity 
  · If a sedative was used, your doctor will tell you when it is safe for you to do your normal activities.  
  · For your safety, do not drive or operate any machinery that could be dangerous. Wait until the medicine wears off and you can think clearly and react easily. Diet 
  · You can eat your normal diet. Follow-up care is a key part of your treatment and safety. Be sure to make and go to all appointments, and call your doctor if you are having problems. It's also a good idea to know your test results and keep a list of the medicines you take. When should you call for help? Watch closely for changes in your health, and be sure to contact your doctor if you have any problems. Where can you learn more? Go to http://frances-abby.info/.  
Enter W373 in the search box to learn more about \"Transesophageal Echocardiogram: What to Expect at Home. \" Current as of: December 6, 2017 Content Version: 11.7 © 4762-9189 [employer], Inc[employer]. Care instructions adapted under license by [employer] (which disclaims liability or warranty for this information). If you have questions about a medical condition or this instruction, always ask your healthcare professional. [address]  any warranty or liability for your use of this information. Introducing Kenny Byrd As a [employer] patient, I wanted to make you aware of our electronic visit tool called Kenny Byrd. [employer] 24/7 allows you to connect within minutes with a medical provider 24 hours a day, seven days a week via a mobile device or tablet or logging into a secure website from your computer. You can access Kenny Byrd from anywhere in the [place]. A virtual visit might be right for you when you have a simple condition and feel like you just dont want to get out of bed, or cant get away from work for an appointment, when your regular [employer] provider is not available (evenings, weekends or holidays), or when youre out of town and need minor care. Electronic visits cost only $49 and if the [employer] 24/7 provider determines a prescription is needed to treat your condition, one can be electronically transmitted to a nearby pharmacy*. Please take a moment to enroll today if you have not already done so. The enrollment process is free and takes just a few minutes. To enroll, please download the [employer] 24/7 sully to your tablet or phone, or visit [email or web address] to enroll on your computer. And, as an [address] [place] [address] patient with a [employer] account, the results of your visits will be scanned into your electronic medical record and your primary care provider will be able to view the scanned results. We urge you to continue to see your regular Arcenio Rutherford provider for your ongoing medical care. And while your primary care provider may not be the one available when you seek a Kenny Byrd virtual visit, the peace of mind you get from getting a real diagnosis real time can be priceless. For more information on Kenny Byrd, view our Frequently Asked Questions (FAQs) at [email or web address]. Sincerely, 
 
Sunil Lin MD 
Chief Medical Officer [facility] Nalini Franklin *:  certain medications cannot be prescribed via Kenny Byrd Providers Seen During Your Hospitalization Provider Specialty Primary office phone Ke Blair MD Cardiology [phone] Your Primary Care Physician (PCP) Primary Care Physician Office Phone Office Fax Llano Stains [phone] You are allergic to the following No active allergies Recent Documentation Height Weight BMI Smoking Status 1.88 m 80.9 kg 22.9 kg/m2 Never Smoker Emergency Contacts Name Discharge Info Relation Home Work Mobile Nata Bishop DISCHARGE CAREGIVER [3] Spouse [3] [phone] Patient Belongings The following personal items are in your possession at time of discharge: 
     Visual Aid: Glasses Please provide this summary of care documentation to your next provider. Signatures-by signing, you are acknowledging that this After Visit Summary has been reviewed with you and you have received a copy. Patient Signature:  ____________________________________________________________ Date:  ____________________________________________________________  
  
Magdalene Miguel Provider Signature:  ____________________________________________________________ Date:  ____________________________________________________________

## 2018-10-01 NOTE — DISCHARGE INSTRUCTIONS
Transesophageal Echocardiogram: What to Expect at [address] [facility]  A transesophageal echocardiogram is a test to help your doctor look at the inside of your heart. A small device called a transducer directs sound waves toward your heart. The sound waves make a picture of the heart's valves and chambers. Before the test, your throat was sprayed with medicine to numb it. You won't be able to eat or drink until the numbness wears off. Your throat may be sore for a few days. You may have had a sedative to help you relax. You may be unsteady after having sedation. It can take a few hours for the medicine's effects to wear off. Common side effects include nausea, vomiting, and feeling sleepy or tired. This care sheet gives you a general idea about how long it will take for you to recover. But each person recovers at a different pace. Follow the steps below to feel better as quickly as possible. How can you care for yourself at home? Activity    · If a sedative was used, your doctor will tell you when it is safe for you to do your normal activities.     · For your safety, do not drive or operate any machinery that could be dangerous. Wait until the medicine wears off and you can think clearly and react easily. Diet    · You can eat your normal diet. Follow-up care is a key part of your treatment and safety. Be sure to make and go to all appointments, and call your doctor if you are having problems. It's also a good idea to know your test results and keep a list of the medicines you take. When should you call for help? Watch closely for changes in your health, and be sure to contact your doctor if you have any problems. Where can you learn more? Go to http://frances-abby.info/. Enter C279 in the search box to learn more about \"Transesophageal Echocardiogram: What to Expect at Home. \"  Current as of: December 6, 2017  Content Version: 11.7  © 5471-1993 Healthwise, [facility].  Care instructions adapted under license by [employer] (which disclaims liability or warranty for this information). If you have questions about a medical condition or this instruction, always ask your healthcare professional. [address] any warranty or liability for your use of this information.

## 2018-10-04 ENCOUNTER — TELEPHONE (OUTPATIENT)
Dept: CARDIOLOGY CLINIC | Age: 78
End: 2018-10-04

## 2018-10-04 NOTE — TELEPHONE ENCOUNTER
Returned call. Spoke with patient. States was feeling sick yesterday. Has been coughing up a substance he describes \"looks like the stuff they put down my throat for the SUSHMA\". States he felt as if he had a fever last night but did not take his temperature. Instructed patient to take temperature upon return call. Strongly reinforced patient not to discontinue Plavix as it is needed for the Watchman 6 months post procedure. Is hold protonix until further notice. Stated no one in his house is ill but did mention his  is sick, but he states he did not shake his hand. Please advise.

## 2018-10-04 NOTE — TELEPHONE ENCOUNTER
Pt called to discuss ill feeling he has been experiencing since starting new pantoprazole 20mg and clopidogrel 75mg medication.   Phone # [phone]  Thanks

## 2018-10-08 ENCOUNTER — OFFICE VISIT (OUTPATIENT)
Dept: FAMILY MEDICINE CLINIC | Age: 78
End: 2018-10-08

## 2018-10-08 VITALS
SYSTOLIC BLOOD PRESSURE: 143 MMHG | WEIGHT: 180 LBS | HEIGHT: 74 IN | HEART RATE: 65 BPM | RESPIRATION RATE: 20 BRPM | BODY MASS INDEX: 23.1 KG/M2 | OXYGEN SATURATION: 98 % | DIASTOLIC BLOOD PRESSURE: 79 MMHG | TEMPERATURE: 97.9 F

## 2018-10-08 DIAGNOSIS — J06.9 UPPER RESPIRATORY TRACT INFECTION, UNSPECIFIED TYPE: Primary | ICD-10-CM

## 2018-10-08 DIAGNOSIS — I10 ESSENTIAL HYPERTENSION: ICD-10-CM

## 2018-10-08 RX ORDER — BENZONATATE 100 MG/1
100 CAPSULE ORAL
Qty: 60 CAP | Refills: 1 | Status: SHIPPED | OUTPATIENT
Start: 2018-10-08 | End: 2018-10-15

## 2018-10-08 RX ORDER — AZITHROMYCIN 250 MG/1
TABLET, FILM COATED ORAL
Qty: 6 TAB | Refills: 0 | Status: SHIPPED | OUTPATIENT
Start: 2018-10-08 | End: 2018-11-09 | Stop reason: ALTCHOICE

## 2018-10-08 RX ORDER — GLUCOSAM/CHONDRO/HERB 149/HYAL 750-100 MG
1 TABLET ORAL 3 TIMES DAILY
COMMUNITY
End: 2018-11-09 | Stop reason: SDUPTHER

## 2018-10-08 NOTE — PROGRESS NOTES
Patient here for cough, congestion x 1 week. 1. Have you been to the ER, urgent care clinic since your last visit? Hospitalized since your last visit? No    2. Have you seen or consulted any other health care providers outside of the [address] [place] [address] since your last visit? Include any pap smears or colon screening. No\      Chief Complaint   Patient presents with    Cough     cough, congestion , sinus drainage since last week     He is a 77 y.o. male who presents for evalution. Reviewed PmHx, RxHx, FmHx, SocHx, AllgHx and updated and dated in the chart. Patient Active Problem List    Diagnosis    Atrial fibrillation ([facility]    Anticoagulant drug declined    ACP (advance care planning)     discussed      Pacemaker    Essential hypertension    Chronic atrial fibrillation ([facility]       Review of Systems - negative except as listed above in the HPI    Objective:     Vitals:    10/08/18 1106   BP: 143/79   Pulse: 65   Resp: 20   Temp: 97.9 °F (36.6 °C)   SpO2: 98%   Weight: 180 lb (81.6 kg)   Height: 6' 2\" (1.88 m)     Physical Examination: General appearance - alert, well appearing, and in no distress  Neck - supple, no significant adenopathy  Chest - rhonchi noted left side  Heart - normal rate, regular rhythm, normal S1, S2, no murmurs, rubs, clicks or gallops      Assessment/ Plan:   Diagnoses and all orders for this visit:    1. Upper respiratory tract infection, unspecified type  -     azithromycin (ZITHROMAX) 250 mg tablet; Take two tablets today then one tablet daily  -     benzonatate (TESSALON PERLES) 100 mg capsule; Take 1 Cap by mouth three (3) times daily as needed for Cough for up to 7 days. 2. Essential hypertension  -at goal       Follow-up Disposition:  Return if symptoms worsen or fail to improve. I have discussed the diagnosis with the patient and the intended plan as seen in the above orders. The patient understands and agrees with the plan.  The patient has received an after-visit summary and questions were answered concerning future plans. Medication Side Effects and Warnings were discussed with patient  Patient Labs were reviewed and or requested:  Patient Past Records were reviewed and or requested    Mariia Camacho M.D. There are no Patient Instructions on file for this visit.

## 2018-10-08 NOTE — MR AVS SNAPSHOT
[address] [address] [place] [address] 
[phone] Patient: Bryanna Erickson MRN: [id number] WTU:5/25/1879 Visit Information Date & Time Provider Department Dept. Phone Encounter #  
 10/8/2018 10:45 AM Juany Wells MD [address] [facility] [phone] [id number] Follow-up Instructions Return if symptoms worsen or fail to improve. Your Appointments 4/12/2019  1:20 PM  
ESTABLISHED PATIENT with Patrick Main MD  
[facility] at [address] [facility]) Appt Note: 6 mo fu appt  [facility] 71 [address]  
[address] [address] [address]  
  
    
 7/15/2019  9:15 AM  
PACEMAKER with RAY FIELD  
[facility] ([facility] SCHEDULING) Appt Note: med/icd/stf  
 [address] 100[address] [facility]  
[phone]  
  
   
 401 [facility] [address]  
  
    
 7/15/2019 10:00 AM  
ESTABLISHED PATIENT with Patrick Main MD  
[facility] ([facility]) Appt Note: annual  pacer also [address] [place] [address] [facility] [address]  
[phone]  
  
   
 [address] [facility] [address]  
  
    
  
 10/9/2018 11:30 AM  
REMOTE OFFICE VISIT with Shahnaz Echavarria [facility] ([facility] SCHEDULING) Appt Note: cl/icd/stf  
 [address] [facility] [facility]  
[phone]  
  
   
 [address] [address] [address] [place]  
  
    
 1/10/2019 11:15 AM  
REMOTE OFFICE VISIT with Shahnaz Echavarria [facility] ([facility] SCHEDULING) Appt Note: cl/icd/stf  
 [address] [facility] [facility]  
[phone]  
  
    
 4/11/2019 11:30 AM  
 REMOTE OFFICE VISIT with Justa Guadarrama [facility] ([facility]AR) Appt Note: cl/icd/stf  
 320 [facility] 600 9900 [facility]  
[phone] Upcoming Health Maintenance Date Due Shingrix Vaccine Age 50> (1 of 2) 3/30/1989 GLAUCOMA SCREENING Q2Y 3/30/2004 MEDICARE YEARLY EXAM 11/10/2018 DTaP/Tdap/Td series (2 - Td) 11/9/2027 Allergies as of 10/8/2018  Review Complete On: 10/8/2018 By: Francis Osorio MD  
 No Known Allergies Current Immunizations  Reviewed on 6/29/2018 Name Date Influenza High Dose Vaccine PF 11/9/2017 Influenza Vaccine (Tri) Adjuvanted 9/6/2018 Pneumococcal Conjugate (PCV-13) 7/13/2016 Pneumococcal Polysaccharide (PPSV-23) 11/9/2017 Zoster Vaccine, Live 10/1/2015 Not reviewed this visit You Were Diagnosed With   
  
 Codes Comments Upper respiratory tract infection, unspecified type    -  Primary ICD-10-CM: J06.9 ICD-9-CM: 465.9 Essential hypertension     ICD-10-CM: I10 
ICD-9-CM: 401.9 Vitals BP Pulse Temp Resp Height(growth percentile) Weight(growth percentile) 143/79 65 97.9 °F (36.6 °C) 20 6' 2\" (1.88 m) 180 lb (81.6 kg) SpO2 BMI Smoking Status 98% 23.11 kg/m2 Never Smoker Vitals History BMI and BSA Data Body Mass Index Body Surface Area  
 23.11 kg/m 2 2.06 m 2 Preferred Pharmacy Pharmacy Name Phone [facility] [address], [address] [phone] Your Updated Medication List  
  
   
This list is accurate as of 10/8/18 11:28 AM.  Always use your most recent med list.  
  
  
  
  
 aspirin delayed-release 81 mg tablet Take 1 Tab by mouth daily. azithromycin 250 mg tablet Commonly known as:  Muriel Casillas Take two tablets today then one tablet daily  
  
 benzonatate 100 mg capsule Commonly known as:  TESSALON PERLES  
 Take 1 Cap by mouth three (3) times daily as needed for Cough for up to 7 days. BYSTOLIC 10 mg tablet Generic drug:  nebivolol TAKE ONE TABLET BY MOUTH DAILY  
  
 clopidogrel 75 mg Tab Commonly known as:  PLAVIX Take 1 Tab by mouth daily. FISH -1,000 mg capsule Generic drug:  fish oil-omega-3 fatty acids Take 1 Cap by mouth daily. omega 3-DHA-EPA-fish oil 1,000 mg (120 mg-180 mg) capsule Take 1 Cap by mouth two (2) times a day. pantoprazole 20 mg tablet Commonly known as:  PROTONIX Take 2 Tabs by mouth daily. saw palmetto 160 mg Cap Take 160 mg by mouth three (3) times daily. Prescriptions Sent to Pharmacy Refills  
 azithromycin (ZITHROMAX) 250 mg tablet 0 Sig: Take two tablets today then one tablet daily Class: Normal  
 Pharmacy: [facility] [address] [facility] [address], [address] Ph #: [phone]  
 benzonatate (TESSALON PERLES) 100 mg capsule 1 Sig: Take 1 Cap by mouth three (3) times daily as needed for Cough for up to 7 days. Class: Normal  
 Pharmacy: [facility] [address], [address] Ph #: [phone] Route: Oral  
  
Follow-up Instructions Return if symptoms worsen or fail to improve. Please provide this summary of care documentation to your next provider. Your primary care clinician is listed as Isabel Caceres. If you have any questions after today's visit, please call [phone].

## 2018-10-10 DIAGNOSIS — I10 ESSENTIAL HYPERTENSION WITH GOAL BLOOD PRESSURE LESS THAN 140/90: ICD-10-CM

## 2018-10-10 DIAGNOSIS — I48.20 CHRONIC ATRIAL FIBRILLATION (HCC): ICD-10-CM

## 2018-10-10 RX ORDER — NEBIVOLOL 10 MG/1
TABLET ORAL
Qty: 30 TAB | Refills: 1 | Status: SHIPPED | OUTPATIENT
Start: 2018-10-10 | End: 2018-12-28 | Stop reason: SDUPTHER

## 2018-10-10 NOTE — TELEPHONE ENCOUNTER
----- Message from Son Askew sent at 10/10/2018  7:58 AM EDT -----  Regarding: Dr. Gaxiola/Rx  Patient is requesting a refill on his Rx Bystolic , he is completley out, to be called into his pharmacy;    [address] [place] [address]  (phone) [phone] ·  Mon - Fri: 9:00 AM - 9:00 PM  Sat: 9:00 AM - 6:00 PM

## 2018-10-17 ENCOUNTER — TELEPHONE (OUTPATIENT)
Dept: CARDIOLOGY CLINIC | Age: 78
End: 2018-10-17

## 2018-10-17 NOTE — TELEPHONE ENCOUNTER
Pt daughter called back following up on call she placed this morning and didn't want to leave another msg. Call passed to [facility] to give to nurse.   Thanks

## 2018-10-17 NOTE — TELEPHONE ENCOUNTER
Pt daughter is calling in regards to patients medications that Dr. Adriana Mora has placed patient on. Family has some concerns. medication names: clopidogrel (PLAVIX) 75 mg tab, pantoprazole (PROTONIX) 20 mg tablet. Causing the patient to feel sick.    Phone# [phone]

## 2018-10-18 NOTE — TELEPHONE ENCOUNTER
Spoke with patient's daughter. C:Patient daughter states that Mr. Caro Maxwell has not been feeling, disoriented, forgetful. O: SUSHMA on 10/1 post Watchman implant. Started Plavix and Protonix on 10/15. Patient's daughter was concerned his illness was related to these medications. Also mentioned he recently had a sinus infection and was treated with antibiotics and steroids. Those medications have been completed. No residual fever, or cough. Also inquired how long he needs to continue Protonix. Informed her Plavix is for 6 months post Watchman implant. I: Informed patient's daughter he may need to see his pcp for labs and further work up as his symptoms aren't normally associated to those medications. She states they are out of town on vacation and will wait for recommendation from our office. Please advise.

## 2018-10-19 NOTE — TELEPHONE ENCOUNTER
Spoke with patient's daughter. Informed her patient will take a PPI for the 6 months post Watchman along with Plavix. Per EDWIN Rainey NP, if necessary Protonix can be changed to an alternative PPI if unable to tolerate.

## 2018-10-29 ENCOUNTER — TELEPHONE (OUTPATIENT)
Dept: CARDIOLOGY CLINIC | Age: 78
End: 2018-10-29

## 2018-10-29 NOTE — TELEPHONE ENCOUNTER
Pt called to discuss medication that he is on, that he believes is messing with his eyes.     Phone # [phone]  Thanks

## 2018-10-31 ENCOUNTER — TELEPHONE (OUTPATIENT)
Dept: CARDIOLOGY CLINIC | Age: 78
End: 2018-10-31

## 2018-10-31 NOTE — TELEPHONE ENCOUNTER
Spoke with patient's spouse. Informed her Dr. Ulysses Davis recommended for patient to restart Plavix to see if he could tolerate that medication by itself. If not to discontinue if intolerable. Patient spouse stated she will speak with him about restarting medication and contact our office if he has any concerns.

## 2018-11-01 ENCOUNTER — TELEPHONE (OUTPATIENT)
Dept: CARDIOLOGY CLINIC | Age: 78
End: 2018-11-01

## 2018-11-01 NOTE — TELEPHONE ENCOUNTER
federico-daughter called, she stated the patient is refusing to take any medicine and she also needs to explain what happened this morning.    Phone: [phone]

## 2018-11-01 NOTE — TELEPHONE ENCOUNTER
Spoke with patient's daughter. Stated Mr Janelle Palumbo took Plavix yesterday and reported problems with eye focus, toe bleeding and overall not feeling well. Julio Dominguez, patient's daughter requested for patient to be seen in office to discuss his symptoms directly to Dr. Les Saab. Will have our  reach out to Mr Janelle Palumbo for appointment. Please contact patient for scheduling.

## 2018-11-02 NOTE — PROGRESS NOTES
Room # 2    Visit Vitals  /82 (BP 1 Location: Left arm, BP Patient Position: Sitting)   Pulse 64   Resp 16   Ht 6' 2\" (1.88 m)   Wt 185 lb 9.6 oz (84.2 kg)   SpO2 96%   BMI 23.83 kg/m²     No complaints at this time.

## 2018-11-09 ENCOUNTER — CLINICAL SUPPORT (OUTPATIENT)
Dept: CARDIOLOGY CLINIC | Age: 78
End: 2018-11-09

## 2018-11-09 ENCOUNTER — OFFICE VISIT (OUTPATIENT)
Dept: CARDIOLOGY CLINIC | Age: 78
End: 2018-11-09

## 2018-11-09 VITALS
WEIGHT: 185.6 LBS | RESPIRATION RATE: 16 BRPM | DIASTOLIC BLOOD PRESSURE: 82 MMHG | OXYGEN SATURATION: 96 % | HEIGHT: 74 IN | BODY MASS INDEX: 23.82 KG/M2 | SYSTOLIC BLOOD PRESSURE: 130 MMHG | HEART RATE: 64 BPM

## 2018-11-09 DIAGNOSIS — I48.91 ATRIAL FIBRILLATION, UNSPECIFIED TYPE (HCC): Primary | ICD-10-CM

## 2018-11-09 DIAGNOSIS — Z95.0 CARDIAC PACEMAKER IN SITU: Primary | ICD-10-CM

## 2018-11-09 RX ORDER — BISMUTH SUBSALICYLATE 262 MG
1 TABLET,CHEWABLE ORAL DAILY
COMMUNITY

## 2018-11-09 NOTE — PROGRESS NOTES
HISTORY OF PRESENTING ILLNESS      Kerline Corea is a 77 y.o. male with hypertension, long-standing persistent atrial fibrillation, single-chamber PPM s/p WATCHMAN who has had intolerance to plavix/protonix and self-discontinued these meds. PPM interrogation reveals normal functioning.           ACTIVE PROBLEM LIST     Patient Active Problem List    Diagnosis Date Noted    Atrial fibrillation ([facility] [address] 08/16/2018     Priority: 1 - One    Anticoagulant drug declined 07/16/2018    ACP (advance care planning) 07/13/2016    Pacemaker 11/02/2015    Essential hypertension 07/20/2015    Chronic atrial fibrillation ([facility] [address] 07/20/2015           PAST MEDICAL HISTORY     Past Medical History:   Diagnosis Date    A-fib [facility])     discovered 2008    Arthritis     Knees    Hypertension     Pacemaker     placed  Nov 2011 ([employer])    Sick sinus syndrome ([facility] [address]     heart monitor showed long pauses (> 4 sec) in 2011           PAST SURGICAL HISTORY     Past Surgical History:   Procedure Laterality Date    HX HERNIA REPAIR      HX PACEMAKER            ALLERGIES     No Known Allergies       FAMILY HISTORY     Family History   Problem Relation Age of Onset    Diabetes Maternal Aunt     Hypertension Mother     Dementia Father     negative for cardiac disease       SOCIAL HISTORY     Social History     Socioeconomic History    Marital status:      Spouse name: Not on file    Number of children: Not on file    Years of education: Not on file    Highest education level: Not on file   Social Needs    Financial resource strain: Not on file    Food insecurity - worry: Not on file    Food insecurity - inability: Not on file   OutTrippin needs - medical: Not on file   OutTrippin needs - non-medical: Not on file   Occupational History    Not on file   Tobacco Use    Smoking status: Never Smoker    Smokeless tobacco: Never Used   Substance and Sexual Activity    Alcohol use:  No Alcohol/week: 0.0 oz    Drug use: No    Sexual activity: Not on file   Other Topics Concern    Not on file   Social History Narrative    Not on file         MEDICATIONS     Current Outpatient Medications   Medication Sig    multivitamin (ONE A DAY) tablet Take 1 Tab by mouth daily.  nebivolol (BYSTOLIC) 10 mg tablet TAKE ONE TABLET BY MOUTH DAILY    aspirin delayed-release 81 mg tablet Take 1 Tab by mouth daily. (Patient taking differently: Take 81 mg by mouth.)    fish oil-omega-3 fatty acids (FISH OIL) 340-1,000 mg capsule Take 1 Cap by mouth daily.  saw palmetto 160 mg cap Take 160 mg by mouth three (3) times daily. No current facility-administered medications for this visit. I have reviewed the nurses notes, vitals, problem list, allergy list, medical history, family, social history and medications. REVIEW OF SYMPTOMS      General: Pt denies excessive weight gain or loss. Pt is able to conduct ADL's  HEENT: Denies blurred vision, headaches, hearing loss, epistaxis and difficulty swallowing. Respiratory: Denies cough, congestion, shortness of breath, LOMELI, wheezing or stridor. Cardiovascular: Denies precordial pain, palpitations, edema or PND  Gastrointestinal: Denies poor appetite, indigestion, abdominal pain or blood in stool  Genitourinary: Denies hematuria, dysuria, increased urinary frequency  Musculoskeletal: Denies joint pain or swelling from muscles or joints  Neurologic: Denies tremor, paresthesias, headache, or sensory motor disturbance  Psychiatric: Denies confusion, insomnia, depression  Integumentray: Denies rash, itching or ulcers. Hematologic: Denies easy bruising, bleeding       PHYSICAL EXAMINATION      Vitals:    11/09/18 1402   BP: 130/82   Pulse: 64   Resp: 16   SpO2: 96%   Weight: 185 lb 9.6 oz (84.2 kg)   Height: 6' 2\" (1.88 m)     General: Well developed, in no acute distress.   HEENT: No jaundice, oral mucosa moist, no oral ulcers  Neck: Supple, no stiffness, no lymphadenopathy, supple  Heart:  Normal S1/S2 negative S3 or S4. Regular, no murmur, gallop or rub, no jugular venous distention  Respiratory: Clear bilaterally x 4, no wheezing or rales  Abdomen:   Soft, non-tender, bowel sounds are active.   Extremities:  No edema, normal cap refill, no cyanosis. Musculoskeletal: No clubbing, no deformities  Neuro: A&Ox3, speech clear, gait stable, cooperative, no focal neurologic deficits  Skin: Skin color is normal. No rashes or lesions. Non diaphoretic, moist.  Vascular: 2+ pulses symmetric in all extremities       DIAGNOSTIC DATA      EKG: Sinus rhythm       LABORATORY DATA      Lab Results   Component Value Date/Time    WBC 5.6 08/06/2018 08:13 AM    HGB 15.2 08/06/2018 08:13 AM    HCT 45.1 08/06/2018 08:13 AM    PLATELET 127 72/99/0584 08:13 AM    MCV 95.1 08/06/2018 08:13 AM      Lab Results   Component Value Date/Time    Sodium 137 08/06/2018 08:13 AM    Potassium 3.9 08/06/2018 08:13 AM    Chloride 101 08/06/2018 08:13 AM    CO2 27 08/06/2018 08:13 AM    Anion gap 9 08/06/2018 08:13 AM    Glucose 183 (H) 08/06/2018 08:13 AM    BUN 26 (H) 08/06/2018 08:13 AM    Creatinine 1.26 08/06/2018 08:13 AM    BUN/Creatinine ratio 21 (H) 08/06/2018 08:13 AM    GFR est AA >60 08/06/2018 08:13 AM    GFR est non-AA 55 (L) 08/06/2018 08:13 AM    Calcium 8.9 08/06/2018 08:13 AM    Bilirubin, total 1.9 (H) 11/09/2017 08:24 AM    AST (SGOT) 26 11/09/2017 08:24 AM    Alk. phosphatase 85 11/09/2017 08:24 AM    Protein, total 7.0 11/09/2017 08:24 AM    Albumin 4.5 11/09/2017 08:24 AM    A-G Ratio 1.8 11/09/2017 08:24 AM    ALT (SGPT) 22 11/09/2017 08:24 AM           ASSESSMENT      1. Atrial fibrillation              A. Long-standing persistent              B. Poorly tolerant of antiplatelet therapy                 C. WATCHMAN  2. PPM                A. Single chamber              B. [place] [facility]  3. Lead malfunction              A.  Insulation breath         PLAN     Continue aspirin alone due to easy-bruising/bleeding. DC protonix due to intolerance. FOLLOW-UP     1 years      Thank you, Ninoska Sheth DO for allowing me to participate in the care of this extraordinarily pleasant male. Please do not hesitate to contact me for further questions/concerns.          Violet Leung MD  Cardiac Electrophysiology / Cardiology    [address]  [address] [facility], [facility], [place] [address]  [facility]    [address]  [phone] / [phone] Fax   [phone] / [phone] Fax

## 2018-11-13 ENCOUNTER — OFFICE VISIT (OUTPATIENT)
Dept: FAMILY MEDICINE CLINIC | Age: 78
End: 2018-11-13

## 2018-11-13 VITALS
HEART RATE: 60 BPM | DIASTOLIC BLOOD PRESSURE: 82 MMHG | TEMPERATURE: 97.4 F | BODY MASS INDEX: 23.23 KG/M2 | OXYGEN SATURATION: 98 % | WEIGHT: 181 LBS | RESPIRATION RATE: 16 BRPM | HEIGHT: 74 IN | SYSTOLIC BLOOD PRESSURE: 145 MMHG

## 2018-11-13 DIAGNOSIS — R35.1 BENIGN PROSTATIC HYPERPLASIA WITH NOCTURIA: ICD-10-CM

## 2018-11-13 DIAGNOSIS — Z00.00 MEDICARE ANNUAL WELLNESS VISIT, SUBSEQUENT: ICD-10-CM

## 2018-11-13 DIAGNOSIS — R73.03 PREDIABETES: Primary | ICD-10-CM

## 2018-11-13 DIAGNOSIS — N40.1 BENIGN PROSTATIC HYPERPLASIA WITH NOCTURIA: ICD-10-CM

## 2018-11-13 RX ORDER — TAMSULOSIN HYDROCHLORIDE 0.4 MG/1
0.4 CAPSULE ORAL DAILY
Qty: 90 CAP | Refills: 3 | Status: SHIPPED | OUTPATIENT
Start: 2018-11-13 | End: 2019-03-29 | Stop reason: SDUPTHER

## 2018-11-13 NOTE — PROGRESS NOTES
Graham Rowe is a 77 y.o. male Chief Complaint Patient presents with  Complete Physical  
 Labs  
 pt here for his medicare well ness visit and on last year labs was found to be prediabetic will recheck today. Pt does get his BG checked and states it was good at 99. Last A1C 6. Pt does exercise everyday. Pt does not overeat Pt also reportswakes up 6x at night to use bathroom. Pt would like something to help with this and will start on flomax. Pt is already on saw palmetto. Pt feels like does not empty completely. Pt is off the coumadin from Dr Boni Bains and will f/u in 1 year with him. he is a 77y.o. year old male who presents for evalution. Reviewed PmHx, RxHx, FmHx, SocHx, AllgHx and updated and dated in the chart. Review of Systems - negative except as listed above in the HPI Objective:  
 
Vitals:  
 11/13/18 0944 BP: 145/82 Pulse: 60 Resp: 16 Temp: 97.4 °F (36.3 °C) TempSrc: Oral  
SpO2: 98% Weight: 181 lb (82.1 kg) Height: 6' 2\" (1.88 m) Current Outpatient Medications Medication Sig  tamsulosin (FLOMAX) 0.4 mg capsule Take 1 Cap by mouth daily.  multivitamin (ONE A DAY) tablet Take 1 Tab by mouth daily.  nebivolol (BYSTOLIC) 10 mg tablet TAKE ONE TABLET BY MOUTH DAILY  aspirin delayed-release 81 mg tablet Take 1 Tab by mouth daily. (Patient taking differently: Take 81 mg by mouth.)  fish oil-omega-3 fatty acids (FISH OIL) 340-1,000 mg capsule Take 1 Cap by mouth daily.  saw palmetto 160 mg cap Take 160 mg by mouth three (3) times daily. No current facility-administered medications for this visit. Physical Examination: General appearance - alert, well appearing, and in no distress Mental status - alert, oriented to person, place, and time Eyes - pupils equal and reactive, extraocular eye movements intact Ears - bilateral TM's and external ear canals normal 
Mouth - mucous membranes moist, pharynx normal without lesions Neck - supple, no significant adenopathy Lymphatics - no palpable lymphadenopathy, no hepatosplenomegaly Chest - clear to auscultation, no wheezes, rales or rhonchi, symmetric air entry Heart - normal rate, regular rhythm, normal S1, S2, no murmurs, rubs, clicks or gallops Abdomen - soft, nontender, nondistended, no masses or organomegaly Neurological - alert, oriented, normal speech, no focal findings or movement disorder noted Musculoskeletal - no joint tenderness, deformity or swelling Extremities - peripheral pulses normal, no pedal edema, no clubbing or cyanosis Assessment/ Plan:  
Diagnoses and all orders for this visit: 1. Prediabetes 
-     HEMOGLOBIN A1C WITH EAG 
C/w exercise, following healthy diet try to keep carbs down especially processed 2. Medicare annual wellness visit, subsequent 3. Benign prostatic hyperplasia with nocturia -     tamsulosin (FLOMAX) 0.4 mg capsule; Take 1 Cap by mouth daily. Follow-up Disposition: 
Return if symptoms worsen or fail to improve. I have discussed the diagnosis with the patient and the intended plan as seen in the above orders. The patient has received an after-visit summary and questions were answered concerning future plans. Pt conveyed understanding of plan. Medication Side Effects and Warnings were discussed with patient [address] [facility] [address], DO This is the Subsequent Medicare Annual Wellness Exam, performed 12 months or more after the Initial AWV or the last Subsequent AWV I have reviewed the patient's medical history in detail and updated the computerized patient record. History Past Medical History:  
Diagnosis Date  A-fib ([facility] [address] discovered 2008  Arthritis Knees  Hypertension  Pacemaker   
 placed  Nov 2011 ([employer])  Sick sinus syndrome (HCC)   
 heart monitor showed long pauses (> 4 sec) in 2011 Past Surgical History:  
Procedure Laterality Date  HX HERNIA REPAIR    
  HX PACEMAKER Current Outpatient Medications Medication Sig Dispense Refill  tamsulosin (FLOMAX) 0.4 mg capsule Take 1 Cap by mouth daily. 90 Cap 3  
 multivitamin (ONE A DAY) tablet Take 1 Tab by mouth daily.  nebivolol (BYSTOLIC) 10 mg tablet TAKE ONE TABLET BY MOUTH DAILY 30 Tab 1  
 aspirin delayed-release 81 mg tablet Take 1 Tab by mouth daily. (Patient taking differently: Take 81 mg by mouth.) 60 Tab 0  
 fish oil-omega-3 fatty acids (FISH OIL) 340-1,000 mg capsule Take 1 Cap by mouth daily.  saw palmetto 160 mg cap Take 160 mg by mouth three (3) times daily. No Known Allergies Family History Problem Relation Age of Onset  Diabetes Maternal Aunt  Hypertension Mother  Dementia Father Social History Tobacco Use  Smoking status: Never Smoker  Smokeless tobacco: Never Used Substance Use Topics  Alcohol use: No  
  Alcohol/week: 0.0 oz Patient Active Problem List  
Diagnosis Code  Essential hypertension I10  Chronic atrial fibrillation (HCC) I48.2  Pacemaker Z95.0  ACP (advance care planning) Z71.89  
 Anticoagulant drug declined Z53.20  Atrial fibrillation ([facility] [address] I48.91 Depression Risk Factor Screening: PHQ over the last two weeks 11/13/2018 Little interest or pleasure in doing things Not at all Feeling down, depressed, irritable, or hopeless Not at all Total Score PHQ 2 0 Alcohol Risk Factor Screening: You do not drink alcohol or very rarely. Functional Ability and Level of Safety:  
Hearing Loss Hearing is good. Activities of Daily Living The home contains: no safety equipment. Patient does total self care Fall Risk Fall Risk Assessment, last 12 mths 11/13/2018 Able to walk? Yes Fall in past 12 months? No  
 
 
Abuse Screen Patient is not abused Cognitive Screening Evaluation of Cognitive Function: 
Has your family/caregiver stated any concerns about your memory: no 
Normal 
 
 Patient Care Team  
Patient Care Team: 
Janice Sanchez DO as PCP - [facility] [place]) Anjel Roper MD (Cardiology) Osmin Machado MD as Surgeon (Cardiothoracic Surgery) Claudette Musa, MD (Cardiology) Assessment/Plan Education and counseling provided: 
Are appropriate based on today's review and evaluation End-of-Life planning (with patient's consent) Diagnoses and all orders for this visit: 1. Prediabetes 
-     HEMOGLOBIN A1C WITH EAG 2. Medicare annual wellness visit, subsequent 3. Benign prostatic hyperplasia with nocturia -     tamsulosin (FLOMAX) 0.4 mg capsule; Take 1 Cap by mouth daily. Health Maintenance Due Topic Date Due  Shingrix Vaccine Age 50> (1 of 2) 03/30/1989  GLAUCOMA SCREENING Q2Y  03/30/2004  MEDICARE YEARLY EXAM  11/10/2018 I have discussed the diagnosis with the patient and the intended plan as seen in the above orders. The patient has received an after-visit summary and questions were answered concerning future plans. Pt conveyed understanding of plan. Dr Gregorio Lopez

## 2018-11-13 NOTE — PATIENT INSTRUCTIONS
Medicare Wellness Visit, Male The best way to live healthy is to have a lifestyle where you eat a well-balanced diet, exercise regularly, limit alcohol use, and quit all forms of tobacco/nicotine, if applicable. Regular preventive services are another way to keep healthy. Preventive services (vaccines, screening tests, monitoring & exams) can help personalize your care plan, which helps you manage your own care. Screening tests can find health problems at the earliest stages, when they are easiest to treat. [facility] follows the current, evidence-based guidelines published by the [facility] Anibal Marcio ([facility]) when recommending preventive services for our patients. Because we follow these guidelines, sometimes recommendations change over time as research supports it. (For example, a prostate screening blood test is no longer routinely recommended for men with no symptoms.) Of course, you and your doctor may decide to screen more often for some diseases, based on your risk and co-morbidities (chronic disease you are already diagnosed with). Preventive services for you include: - Medicare offers their members a free annual wellness visit, which is time for you and your primary care provider to discuss and plan for your preventive service needs. Take advantage of this benefit every year! 
-All adults over age 72 should receive the recommended pneumonia vaccines. Current USPSTF guidelines recommend a series of two vaccines for the best pneumonia protection.  
-All adults should have a flu vaccine yearly and an ECG.  All adults age 61 and older should receive a shingles vaccine once in their lifetime.   
-All adults age 38-68 who are overweight should have a diabetes screening test once every three years.  
-Other screening tests & preventive services for persons with diabetes include: an eye exam to screen for diabetic retinopathy, a kidney function test, a foot exam, and stricter control over your cholesterol.  
-Cardiovascular screening for adults with routine risk involves an electrocardiogram (ECG) at intervals determined by the provider.  
-Colorectal cancer screening should be done for adults age 54-65 with no increased risk factors for colorectal cancer. There are a number of acceptable methods of screening for this type of cancer. Each test has its own benefits and drawbacks. Discuss with your provider what is most appropriate for you during your annual wellness visit. The different tests include: colonoscopy (considered the best screening method), a fecal occult blood test, a fecal DNA test, and sigmoidoscopy. 
-All adults born between [facility] should be screened once for Hepatitis C. 
-An Abdominal Aortic Aneurysm (AAA) Screening is recommended for men age 73-68 who has ever smoked in their lifetime. Here is a list of your current Health Maintenance items (your personalized list of preventive services) with a due date: 
Health Maintenance Due Topic Date Due  Shingles Vaccine (1 of 2) 03/30/1989  Glaucoma Screening   03/30/2004 [address] [place] [address] Annual Well Visit  11/10/2018

## 2018-11-13 NOTE — PROGRESS NOTES
Chief Complaint Patient presents with  Complete Physical  
 
Patient presents in office today for CPE. Patient is not fasting. No concerns. 1. Have you been to the ER, urgent care clinic since your last visit? Hospitalized since your last visit? No 
 
2. Have you seen or consulted any other health care providers outside of the [address] [place] [address] since your last visit? Include any pap smears or colon screening. No  
 
Learning Assessment 6/29/2018 PRIMARY LEARNER Patient HIGHEST LEVEL OF EDUCATION - PRIMARY LEARNER  GRADUATED HIGH SCHOOL OR GED  
BARRIERS PRIMARY LEARNER NONE  
CO-LEARNER CAREGIVER No  
PRIMARY LANGUAGE ENGLISH  
LEARNER PREFERENCE PRIMARY DEMONSTRATION  
  -  
ANSWERED BY Patient RELATIONSHIP SELF

## 2018-11-14 ENCOUNTER — TELEPHONE (OUTPATIENT)
Dept: FAMILY MEDICINE CLINIC | Age: 78
End: 2018-11-14

## 2018-11-14 LAB
EST. AVERAGE GLUCOSE BLD GHB EST-MCNC: 128 MG/DL
HBA1C MFR BLD: 6.1 % (ref 4.8–5.6)

## 2018-12-27 ENCOUNTER — OFFICE VISIT (OUTPATIENT)
Dept: FAMILY MEDICINE CLINIC | Age: 78
End: 2018-12-27

## 2018-12-27 VITALS
TEMPERATURE: 97.4 F | SYSTOLIC BLOOD PRESSURE: 167 MMHG | BODY MASS INDEX: 23.74 KG/M2 | DIASTOLIC BLOOD PRESSURE: 94 MMHG | WEIGHT: 185 LBS | HEART RATE: 60 BPM | HEIGHT: 74 IN | OXYGEN SATURATION: 98 % | RESPIRATION RATE: 17 BRPM

## 2018-12-27 DIAGNOSIS — G44.52 NEW DAILY PERSISTENT HEADACHE: Primary | ICD-10-CM

## 2018-12-27 DIAGNOSIS — S06.0X0D CONCUSSION WITHOUT LOSS OF CONSCIOUSNESS, SUBSEQUENT ENCOUNTER: ICD-10-CM

## 2018-12-27 NOTE — PATIENT INSTRUCTIONS
Preventing Outdoor Falls: Care Instructions  Your Care Instructions    Worries about falls don't need to keep you indoors. Outdoor activities like walking have big benefits for your health. You will need to watch your step and learn a few safety measures. If you are worried about having a fall outdoors, ask your doctor about exercises, classes, or physical therapy that may help. You can learn ways to gain strength, flexibility, and balance. Ask if it might help to use a cane or walker. You can make your time outdoors safer with a few simple measures. Follow-up care is a key part of your treatment and safety. Be sure to make and go to all appointments, and call your doctor if you are having problems. It's also a good idea to know your test results and keep a list of the medicines you take. How can you prevent falls outdoors? · Wear shoes with firm soles and low heels. If you have to walk on an icy surface, use grippers that can be worn over your shoes in bad weather. · Be extra careful if weather is bad. Walk on the grass when the sidewalks are slick. If you live in a place that gets snow and ice in the winter, sprinkle salt on slippery stairs and sidewalks. · Be careful getting on or off buses and trains or getting in and out of cars. If handrails are available, use them. · Be careful when you cross the street. Look for crosswalks or places where curb cuts or ramps are present. · Try not to hurry, especially if you are carrying something. · Be cautious in parking lots or garages. There may be curbs or changes in pavement, or the height of the pavement may vary. · Make sure to wear the correct eyeglasses, if you need them. Reading glasses or bifocals can make it harder to see hazards that might be in your way. · If you are walking outdoors for exercise, try to:  ? Walk in well-lighted, well-maintained areas. These include high school or college tracks, shopping malls, and public spaces.   ? Walk with a partner. ? Watch out for cracked sidewalks, curbs, changes in the height of the pavement, exposed tree roots, and debris such as fallen leaves or branches. Where can you learn more? Go to http://frances-abby.info/. Enter J041 in the search box to learn more about \"Preventing Outdoor Falls: Care Instructions. \"  Current as of: March 16, 2018  Content Version: 11.8  © 8579-7136 Healthwise, [employer]. Care instructions adapted under license by [employer] (which disclaims liability or warranty for this information). If you have questions about a medical condition or this instruction, always ask your healthcare professional. [place] [address] any warranty or liability for your use of this information.

## 2018-12-27 NOTE — PROGRESS NOTES
Chief Complaint   Patient presents with    Fall     12/10/18 slipped in snow and head struck post    Headache     some slurred speech/ loss for words    Labs     NON Fasting     1. Have you been to the ER, urgent care clinic since your last visit? Hospitalized since your last visit? No    2. Have you seen or consulted any other health care providers outside of the [address] [place] [address] since your last visit? Include any pap smears or colon screening. No    Feels light headed, fell over two weeks ago, better now and less HA's      Chief Complaint   Patient presents with    Fall     12/10/18 slipped in snow and head struck post    Headache     some slurred speech/ loss for words    Labs     NON Fasting     He is a 77 y.o. male who presents for evalution. Reviewed PmHx, RxHx, FmHx, SocHx, AllgHx and updated and dated in the chart. Patient Active Problem List    Diagnosis    [facility]rial fibrillation [facility] [address]    Anticoagulant drug declined    ACP (advance care planning)     discussed      Pacemaker    Essential hypertension    Chronic atrial fibrillation ([facility] [address]       Review of Systems - negative except as listed above in the HPI    Objective:     Vitals:    12/27/18 0957   BP: (!) 167/94   Pulse: 60   Resp: 17   Temp: 97.4 °F (36.3 °C)   TempSrc: Oral   SpO2: 98%   Weight: 185 lb (83.9 kg)   Height: 6' 2\" (1.88 m)     Physical Examination: General appearance - alert, well appearing, and in no distress  Neck - supple, no significant adenopathy  Chest - clear to auscultation, no wheezes, rales or rhonchi, symmetric air entry  Heart - normal rate, regular rhythm, normal S1, S2, no murmurs, rubs, clicks or gallops  Extremities - peripheral pulses normal, no pedal edema, no clubbing or cyanosis      Assessment/ Plan:   Diagnoses and all orders for this visit:    1. New daily persistent headache  2.  Concussion without loss of consciousness, subsequent encounter  -overall better  -labs utd  -observe now Follow-up Disposition: Not on File    I have discussed the diagnosis with the patient and the intended plan as seen in the above orders. The patient understands and agrees with the plan. The patient has received an after-visit summary and questions were answered concerning future plans. Medication Side Effects and Warnings were discussed with patient  Patient Labs were reviewed and or requested:  Patient Past Records were reviewed and or requested    Keshav Dodson M.D. Patient Instructions        Preventing Outdoor Falls: Care Instructions  Your Care Instructions    Worries about falls don't need to keep you indoors. Outdoor activities like walking have big benefits for your health. You will need to watch your step and learn a few safety measures. If you are worried about having a fall outdoors, ask your doctor about exercises, classes, or physical therapy that may help. You can learn ways to gain strength, flexibility, and balance. Ask if it might help to use a cane or walker. You can make your time outdoors safer with a few simple measures. Follow-up care is a key part of your treatment and safety. Be sure to make and go to all appointments, and call your doctor if you are having problems. It's also a good idea to know your test results and keep a list of the medicines you take. How can you prevent falls outdoors? · Wear shoes with firm soles and low heels. If you have to walk on an icy surface, use grippers that can be worn over your shoes in bad weather. · Be extra careful if weather is bad. Walk on the grass when the sidewalks are slick. If you live in a place that gets snow and ice in the winter, sprinkle salt on slippery stairs and sidewalks. · Be careful getting on or off buses and trains or getting in and out of cars. If handrails are available, use them. · Be careful when you cross the street. Look for crosswalks or places where curb cuts or ramps are present.   · Try not to hurry, especially if you are carrying something. · Be cautious in parking lots or garages. There may be curbs or changes in pavement, or the height of the pavement may vary. · Make sure to wear the correct eyeglasses, if you need them. Reading glasses or bifocals can make it harder to see hazards that might be in your way. · If you are walking outdoors for exercise, try to:  ? Walk in well-lighted, well-maintained areas. These include high school or college tracks, shopping malls, and public spaces. ? Walk with a partner. ? Watch out for cracked sidewalks, curbs, changes in the height of the pavement, exposed tree roots, and debris such as fallen leaves or branches. Where can you learn more? Go to http://frances-abby.info/. Enter K740 in the search box to learn more about \"Preventing Outdoor Falls: Care Instructions. \"  Current as of: March 16, 2018  Content Version: 11.8  © 0952-0362 Healthwise, Incorporated. Care instructions adapted under license by [employer] (which disclaims liability or warranty for this information). If you have questions about a medical condition or this instruction, always ask your healthcare professional. [address] any warranty or liability for your use of this information.

## 2018-12-28 DIAGNOSIS — I48.20 CHRONIC ATRIAL FIBRILLATION (HCC): ICD-10-CM

## 2018-12-28 DIAGNOSIS — I10 ESSENTIAL HYPERTENSION WITH GOAL BLOOD PRESSURE LESS THAN 140/90: ICD-10-CM

## 2018-12-31 RX ORDER — NEBIVOLOL 10 MG/1
TABLET ORAL
Qty: 30 TAB | Refills: 0 | Status: SHIPPED | OUTPATIENT
Start: 2018-12-31 | End: 2019-01-18 | Stop reason: SDUPTHER

## 2019-01-10 ENCOUNTER — OFFICE VISIT (OUTPATIENT)
Dept: CARDIOLOGY CLINIC | Age: 79
End: 2019-01-10

## 2019-01-10 DIAGNOSIS — Z95.0 CARDIAC PACEMAKER IN SITU: Primary | ICD-10-CM

## 2019-03-29 ENCOUNTER — OFFICE VISIT (OUTPATIENT)
Dept: FAMILY MEDICINE CLINIC | Age: 79
End: 2019-03-29

## 2019-03-29 VITALS
OXYGEN SATURATION: 96 % | HEART RATE: 60 BPM | TEMPERATURE: 97.3 F | RESPIRATION RATE: 16 BRPM | DIASTOLIC BLOOD PRESSURE: 98 MMHG | HEIGHT: 74 IN | SYSTOLIC BLOOD PRESSURE: 173 MMHG | WEIGHT: 186 LBS | BODY MASS INDEX: 23.87 KG/M2

## 2019-03-29 DIAGNOSIS — I10 ESSENTIAL HYPERTENSION: Primary | ICD-10-CM

## 2019-03-29 DIAGNOSIS — R35.1 BENIGN PROSTATIC HYPERPLASIA WITH NOCTURIA: ICD-10-CM

## 2019-03-29 DIAGNOSIS — I48.20 CHRONIC ATRIAL FIBRILLATION (HCC): ICD-10-CM

## 2019-03-29 DIAGNOSIS — I10 ESSENTIAL HYPERTENSION WITH GOAL BLOOD PRESSURE LESS THAN 140/90: ICD-10-CM

## 2019-03-29 DIAGNOSIS — N40.1 BENIGN PROSTATIC HYPERPLASIA WITH NOCTURIA: ICD-10-CM

## 2019-03-29 DIAGNOSIS — R73.03 PREDIABETES: ICD-10-CM

## 2019-03-29 RX ORDER — TAMSULOSIN HYDROCHLORIDE 0.4 MG/1
0.4 CAPSULE ORAL
Qty: 90 CAP | Refills: 3 | Status: SHIPPED | OUTPATIENT
Start: 2019-03-29 | End: 2019-08-08 | Stop reason: SDUPTHER

## 2019-03-29 RX ORDER — NEBIVOLOL 20 MG/1
TABLET ORAL
Qty: 30 TAB | Refills: 1 | Status: SHIPPED | OUTPATIENT
Start: 2019-03-29 | End: 2019-10-06 | Stop reason: SDUPTHER

## 2019-03-29 NOTE — PROGRESS NOTES
Fay Dean is a 77 y.o. male Chief Complaint Patient presents with  Ear Fullness  Labs  
 pt states he has his wife put peroxide in his ears and feels it bubbling so he wants to make sure he doesn't have wax build up. Pt also with prediabetes and his last A1C was 6.1. Limits his carbs and sugar intake. Pt also reports he is taking the flomax and would like to take before bed and advised this is fine. This does help his BPH. BP remains elevated despite bystolic 10 will increase to 20. Pt tolerates fine and states is high at home too. he is a 77y.o. year old male who presents for evalution. Reviewed PmHx, RxHx, FmHx, SocHx, AllgHx and updated and dated in the chart. Review of Systems - negative except as listed above in the HPI Objective:  
 
Vitals:  
 03/29/19 1016 BP: (!) 173/98 Pulse: 60 Resp: 16 Temp: 97.3 °F (36.3 °C) TempSrc: Oral  
SpO2: 96% Weight: 186 lb (84.4 kg) Height: 6' 2\" (1.88 m) Current Outpatient Medications Medication Sig  tamsulosin (FLOMAX) 0.4 mg capsule Take 1 Cap by mouth nightly.  nebivolol (BYSTOLIC) 20 mg tablet TAKE ONE TABLET BY MOUTH DAILY  multivitamin (ONE A DAY) tablet Take 1 Tab by mouth daily.  aspirin delayed-release 81 mg tablet Take 1 Tab by mouth daily. (Patient taking differently: Take 81 mg by mouth.)  fish oil-omega-3 fatty acids (FISH OIL) 340-1,000 mg capsule Take 1 Cap by mouth daily.  saw palmetto 160 mg cap Take 160 mg by mouth three (3) times daily. No current facility-administered medications for this visit. Physical Examination: General appearance - alert, well appearing, and in no distress Eyes - pupils equal and reactive, extraocular eye movements intact Ears - bilateral TM's and external ear canals normal 
Chest - clear to auscultation, no wheezes, rales or rhonchi, symmetric air entry Heart - normal rate, regular rhythm, normal S1, S2, no murmurs, rubs, clicks or gallops Assessment/ Plan:  
Diagnoses and all orders for this visit: 1. Essential hypertension -     METABOLIC PANEL, BASIC 2. Prediabetes -     METABOLIC PANEL, BASIC 
-     HEMOGLOBIN A1C WITH EAG 3. Benign prostatic hyperplasia with nocturia -     tamsulosin (FLOMAX) 0.4 mg capsule; Take 1 Cap by mouth nightly. 4. Chronic atrial fibrillation (HCC) 
-     nebivolol (BYSTOLIC) 20 mg tablet; TAKE ONE TABLET BY MOUTH DAILY 5. Essential hypertension with goal blood pressure less than 140/90 
-     nebivolol (BYSTOLIC) 20 mg tablet; TAKE ONE TABLET BY MOUTH DAILY Follow-up and Dispositions · Return in about 1 month (around 4/26/2019), or if symptoms worsen or fail to improve. I have discussed the diagnosis with the patient and the intended plan as seen in the above orders. The patient has received an after-visit summary and questions were answered concerning future plans. Pt conveyed understanding of plan. Medication Side Effects and Warnings were discussed with patient [id number] [facility] [address], DO

## 2019-03-29 NOTE — PATIENT INSTRUCTIONS
A Healthy Lifestyle: Care Instructions Your Care Instructions A healthy lifestyle can help you feel good, stay at a healthy weight, and have plenty of energy for both work and play. A healthy lifestyle is something you can share with your whole family. A healthy lifestyle also can lower your risk for serious health problems, such as high blood pressure, heart disease, and diabetes. You can follow a few steps listed below to improve your health and the health of your family. Follow-up care is a key part of your treatment and safety. Be sure to make and go to all appointments, and call your doctor if you are having problems. It's also a good idea to know your test results and keep a list of the medicines you take. How can you care for yourself at home? · Do not eat too much sugar, fat, or fast foods. You can still have dessert and treats now and then. The goal is moderation. · Start small to improve your eating habits. Pay attention to portion sizes, drink less juice and soda pop, and eat more fruits and vegetables. ? Eat a healthy amount of food. A 3-ounce serving of meat, for example, is about the size of a deck of cards. Fill the rest of your plate with vegetables and whole grains. ? Limit the amount of soda and sports drinks you have every day. Drink more water when you are thirsty. ? Eat at least 5 servings of fruits and vegetables every day. It may seem like a lot, but it is not hard to reach this goal. A serving or helping is 1 piece of fruit, 1 cup of vegetables, or 2 cups of leafy, raw vegetables. Have an apple or some carrot sticks as an afternoon snack instead of a candy bar. Try to have fruits and/or vegetables at every meal. 
· Make exercise part of your daily routine. You may want to start with simple activities, such as walking, bicycling, or slow swimming. Try to be active 30 to 60 minutes every day.  You do not need to do all 30 to 60 minutes all at once. For example, you can exercise 3 times a day for 10 or 20 minutes. Moderate exercise is safe for most people, but it is always a good idea to talk to your doctor before starting an exercise program. 
· Keep moving. Danielle Bi the lawn, work in the garden, or [employer]. Take the stairs instead of the elevator at work. · If you smoke, quit. People who smoke have an increased risk for heart attack, stroke, cancer, and other lung illnesses. Quitting is hard, but there are ways to boost your chance of quitting tobacco for good. ? Use nicotine gum, patches, or lozenges. ? Ask your doctor about stop-smoking programs and medicines. ? Keep trying. In addition to reducing your risk of diseases in the future, you will notice some benefits soon after you stop using tobacco. If you have shortness of breath or asthma symptoms, they will likely get better within a few weeks after you quit. · Limit how much alcohol you drink. Moderate amounts of alcohol (up to 2 drinks a day for men, 1 drink a day for women) are okay. But drinking too much can lead to liver problems, high blood pressure, and other health problems. Family health If you have a family, there are many things you can do together to improve your health. · Eat meals together as a family as often as possible. · Eat healthy foods. This includes fruits, vegetables, lean meats and dairy, and whole grains. · Include your family in your fitness plan. Most people think of activities such as jogging or tennis as the way to fitness, but there are many ways you and your family can be more active. Anything that makes you breathe hard and gets your heart pumping is exercise. Here are some tips: 
? Walk to do errands or to take your child to school or the bus. 
? Go for a family bike ride after dinner instead of watching TV. Where can you learn more? Go to http://frances-abby.info/. Enter R222 in the search box to learn more about \"A Healthy Lifestyle: Care Instructions. \" Current as of: September 11, 2018 Content Version: 11.9 © 8814-1111 [employer]map[employer], Incorporated. Care instructions adapted under license by [employer] (which disclaims liability or warranty for this information). If you have questions about a medical condition or this instruction, always ask your healthcare professional. [place] [address] any warranty or liability for your use of this information.

## 2019-03-29 NOTE — PROGRESS NOTES
Chief Complaint Patient presents with  Ear Fullness  Labs Patient presents in office today for f/u and labs. Patient would like to have ears checked out. He states that his ears feel plugged. No other concerns. 1. Have you been to the ER, urgent care clinic since your last visit? Hospitalized since your last visit? No 
 
2. Have you seen or consulted any other health care providers outside of the [address] [place] [address] since your last visit? Include any pap smears or colon screening. No 
 
Learning Assessment 6/29/2018 PRIMARY LEARNER Patient HIGHEST LEVEL OF EDUCATION - PRIMARY LEARNER  GRADUATED HIGH SCHOOL OR GED  
BARRIERS PRIMARY LEARNER NONE  
CO-LEARNER CAREGIVER No  
PRIMARY LANGUAGE ENGLISH  
LEARNER PREFERENCE PRIMARY DEMONSTRATION  
  -  
ANSWERED BY Patient RELATIONSHIP SELF

## 2019-03-30 LAB
BUN SERPL-MCNC: 21 MG/DL (ref 8–27)
BUN/CREAT SERPL: 22 (ref 10–24)
CALCIUM SERPL-MCNC: 9 MG/DL (ref 8.6–10.2)
CHLORIDE SERPL-SCNC: 103 MMOL/L (ref 96–106)
CO2 SERPL-SCNC: 24 MMOL/L (ref 20–29)
CREAT SERPL-MCNC: 0.95 MG/DL (ref 0.76–1.27)
EST. AVERAGE GLUCOSE BLD GHB EST-MCNC: 128 MG/DL
GLUCOSE SERPL-MCNC: 105 MG/DL (ref 65–99)
HBA1C MFR BLD: 6.1 % (ref 4.8–5.6)
POTASSIUM SERPL-SCNC: 4.7 MMOL/L (ref 3.5–5.2)
SODIUM SERPL-SCNC: 139 MMOL/L (ref 134–144)

## 2019-04-01 NOTE — PROGRESS NOTES
Called and spoke to patient.(Marlon) Patient states understanding of lab results per Dr Luis Alberto Carey: Prediabetes unchanged, recheck 6 months. Patient states he will call back to schedule.

## 2019-04-11 ENCOUNTER — OFFICE VISIT (OUTPATIENT)
Dept: CARDIOLOGY CLINIC | Age: 79
End: 2019-04-11

## 2019-04-11 DIAGNOSIS — Z95.0 CARDIAC PACEMAKER IN SITU: Primary | ICD-10-CM

## 2019-08-08 ENCOUNTER — OFFICE VISIT (OUTPATIENT)
Dept: FAMILY MEDICINE CLINIC | Age: 79
End: 2019-08-08

## 2019-08-08 VITALS
HEART RATE: 60 BPM | RESPIRATION RATE: 16 BRPM | DIASTOLIC BLOOD PRESSURE: 89 MMHG | OXYGEN SATURATION: 98 % | TEMPERATURE: 97.3 F | HEIGHT: 74 IN | SYSTOLIC BLOOD PRESSURE: 152 MMHG | WEIGHT: 182 LBS | BODY MASS INDEX: 23.36 KG/M2

## 2019-08-08 DIAGNOSIS — R35.1 BENIGN PROSTATIC HYPERPLASIA WITH NOCTURIA: ICD-10-CM

## 2019-08-08 DIAGNOSIS — R73.03 PREDIABETES: Primary | ICD-10-CM

## 2019-08-08 DIAGNOSIS — N40.1 BENIGN PROSTATIC HYPERPLASIA WITH NOCTURIA: ICD-10-CM

## 2019-08-08 RX ORDER — TAMSULOSIN HYDROCHLORIDE 0.4 MG/1
0.4 CAPSULE ORAL
Qty: 90 CAP | Refills: 3 | Status: SHIPPED | OUTPATIENT
Start: 2019-08-08 | End: 2020-01-09

## 2019-08-08 NOTE — PATIENT INSTRUCTIONS
A Healthy Lifestyle: Care Instructions  Your Care Instructions    A healthy lifestyle can help you feel good, stay at a healthy weight, and have plenty of energy for both work and play. A healthy lifestyle is something you can share with your whole family. A healthy lifestyle also can lower your risk for serious health problems, such as high blood pressure, heart disease, and diabetes. You can follow a few steps listed below to improve your health and the health of your family. Follow-up care is a key part of your treatment and safety. Be sure to make and go to all appointments, and call your doctor if you are having problems. It's also a good idea to know your test results and keep a list of the medicines you take. How can you care for yourself at home? · Do not eat too much sugar, fat, or fast foods. You can still have dessert and treats now and then. The goal is moderation. · Start small to improve your eating habits. Pay attention to portion sizes, drink less juice and soda pop, and eat more fruits and vegetables. ? Eat a healthy amount of food. A 3-ounce serving of meat, for example, is about the size of a deck of cards. Fill the rest of your plate with vegetables and whole grains. ? Limit the amount of soda and sports drinks you have every day. Drink more water when you are thirsty. ? Eat at least 5 servings of fruits and vegetables every day. It may seem like a lot, but it is not hard to reach this goal. A serving or helping is 1 piece of fruit, 1 cup of vegetables, or 2 cups of leafy, raw vegetables. Have an apple or some carrot sticks as an afternoon snack instead of a candy bar. Try to have fruits and/or vegetables at every meal.  · Make exercise part of your daily routine. You may want to start with simple activities, such as walking, bicycling, or slow swimming. Try to be active 30 to 60 minutes every day. You do not need to do all 30 to 60 minutes all at once.  For example, you can exercise 3 times a day for 10 or 20 minutes. Moderate exercise is safe for most people, but it is always a good idea to talk to your doctor before starting an exercise program.  · Keep moving. Orion Mcardle the lawn, work in the garden, or [employer]. Take the stairs instead of the elevator at work. · If you smoke, quit. People who smoke have an increased risk for heart attack, stroke, cancer, and other lung illnesses. Quitting is hard, but there are ways to boost your chance of quitting tobacco for good. ? Use nicotine gum, patches, or lozenges. ? Ask your doctor about stop-smoking programs and medicines. ? Keep trying. In addition to reducing your risk of diseases in the future, you will notice some benefits soon after you stop using tobacco. If you have shortness of breath or asthma symptoms, they will likely get better within a few weeks after you quit. · Limit how much alcohol you drink. Moderate amounts of alcohol (up to 2 drinks a day for men, 1 drink a day for women) are okay. But drinking too much can lead to liver problems, high blood pressure, and other health problems. Family health  If you have a family, there are many things you can do together to improve your health. · Eat meals together as a family as often as possible. · Eat healthy foods. This includes fruits, vegetables, lean meats and dairy, and whole grains. · Include your family in your fitness plan. Most people think of activities such as jogging or tennis as the way to fitness, but there are many ways you and your family can be more active. Anything that makes you breathe hard and gets your heart pumping is exercise. Here are some tips:  ? Walk to do errands or to take your child to school or the bus.  ? Go for a family bike ride after dinner instead of watching TV. Where can you learn more? Go to http://frances-abby.info/. Enter X154 in the search box to learn more about \"A Healthy Lifestyle: Care Instructions. \"  Current as of: September 11, 2018  Content Version: 12.1  © 9588-2739 Healthwise, Incorporated. Care instructions adapted under license by [employer] (which disclaims liability or warranty for this information). If you have questions about a medical condition or this instruction, always ask your healthcare professional. [address] any warranty or liability for your use of this information.

## 2019-08-08 NOTE — PROGRESS NOTES
Rebekah Nobel is a [de-identified] y.o. male   Chief Complaint   Patient presents with   [address]    pt here for follow up of his prediabetes and pot is active and tries to follow a healthy diet. Last A1C was 6.2. Pt also reports he was having side effects from the bystolic so has cut these in half and is toelrating better. Pt stopped flomax and he does not know why, this was helping and now he is waking up freq at night to urinate. Will resend. he is a [de-identified]y.o. year old male who presents for evalution. Reviewed PmHx, RxHx, FmHx, SocHx, AllgHx and updated and dated in the chart. Review of Systems - negative except as listed above in the HPI    Objective:     Vitals:    08/08/19 1104   BP: 152/89   Pulse: 60   Resp: 16   Temp: 97.3 °F (36.3 °C)   TempSrc: Oral   SpO2: 98%   Weight: 182 lb (82.6 kg)   Height: 6' 2\" (1.88 m)       Current Outpatient Medications   Medication Sig    tamsulosin (FLOMAX) 0.4 mg capsule Take 1 Cap by mouth nightly.  nebivolol (BYSTOLIC) 20 mg tablet TAKE ONE TABLET BY MOUTH DAILY (Patient taking differently: 10 mg. TAKE ONE TABLET BY MOUTH DAILY)    multivitamin (ONE A DAY) tablet Take 1 Tab by mouth daily.  aspirin delayed-release 81 mg tablet Take 1 Tab by mouth daily. (Patient taking differently: Take 81 mg by mouth.)    fish oil-omega-3 fatty acids (FISH OIL) 340-1,000 mg capsule Take 1 Cap by mouth daily.  saw palmetto 160 mg cap Take 160 mg by mouth three (3) times daily. No current facility-administered medications for this visit. Physical Examination: General appearance - alert, well appearing, and in no distress  Mental status - alert, oriented to person, place, and time  Chest - clear to auscultation, no wheezes, rales or rhonchi, symmetric air entry  Heart - normal rate, regular rhythm, normal S1, S2, no murmurs, rubs, clicks or gallops      Assessment/ Plan:   Diagnoses and all orders for this visit:    1.  Prediabetes  -     HEMOGLOBIN A1C WITH EAG    2. Benign prostatic hyperplasia with nocturia  -     tamsulosin (FLOMAX) 0.4 mg capsule; Take 1 Cap by mouth nightly. Follow-up and Dispositions    · Return in about 6 months (around 2/8/2020), or if symptoms worsen or fail to improve. I have discussed the diagnosis with the patient and the intended plan as seen in the above orders. The patient has received an after-visit summary and questions were answered concerning future plans. Pt conveyed understanding of plan.     Medication Side Effects and Warnings were discussed with patient      Trey Jurado DO

## 2019-08-08 NOTE — PROGRESS NOTES
Chief Complaint   Patient presents with   [address]     Patient presents in office today for 6 month f/u to labs. No concerns. 1. Have you been to the ER, urgent care clinic since your last visit? Hospitalized since your last visit? No    2. Have you seen or consulted any other health care providers outside of the [address] [place] [address] since your last visit? Include any pap smears or colon screening.  No    Learning Assessment 6/29/2018   PRIMARY LEARNER Patient   HIGHEST LEVEL OF EDUCATION - PRIMARY LEARNER  GRADUATED HIGH SCHOOL OR GED   BARRIERS PRIMARY LEARNER NONE   CO-LEARNER CAREGIVER No   PRIMARY LANGUAGE ENGLISH   LEARNER PREFERENCE PRIMARY DEMONSTRATION     -   ANSWERED BY Patient   RELATIONSHIP SELF

## 2019-08-09 LAB
EST. AVERAGE GLUCOSE BLD GHB EST-MCNC: 128 MG/DL
HBA1C MFR BLD: 6.1 % (ref 4.8–5.6)

## 2019-08-23 NOTE — PROGRESS NOTES
HISTORY OF PRESENTING ILLNESS      Duy Amin is a [de-identified] y.o. male  with hypertension, long-standing persistent atrial fibrillation, single-chamber PPM s/p WATCHMAN who has had intolerance to plavix/protonix and self-discontinued these meds. PPM interrogation reveals normal functioning. He takes aspirin occasionally.         ACTIVE PROBLEM LIST     Patient Active Problem List    Diagnosis Date Noted    Atrial fibrillation ([facility] [address] 08/16/2018     Priority: 1 - One    Prediabetes 03/29/2019    Anticoagulant drug declined 07/16/2018    ACP (advance care planning) 07/13/2016    Pacemaker 11/02/2015    Essential hypertension 07/20/2015    Chronic atrial fibrillation ([address] 07/20/2015           PAST MEDICAL HISTORY     Past Medical History:   Diagnosis Date    A-fib [facility])     discovered 2008    Arthritis     Knees    Hypertension     Pacemaker     placed  Nov 2011 ([address])    Sick sinus syndrome ([address]     heart monitor showed long pauses (> 4 sec) in 2011           PAST SURGICAL HISTORY     Past Surgical History:   Procedure Laterality Date    HX HERNIA REPAIR      HX PACEMAKER            ALLERGIES     No Known Allergies       FAMILY HISTORY     Family History   Problem Relation Age of Onset    Diabetes Maternal Aunt     Hypertension Mother     Dementia Father     negative for cardiac disease       SOCIAL HISTORY     Social History     Socioeconomic History    Marital status:      Spouse name: Not on file    Number of children: Not on file    Years of education: Not on file    Highest education level: Not on file   Tobacco Use    Smoking status: Never Smoker    Smokeless tobacco: Never Used   Substance and Sexual Activity    Alcohol use: No     Alcohol/week: 0.0 standard drinks    Drug use: No         MEDICATIONS     Current Outpatient Medications   Medication Sig    tamsulosin (FLOMAX) 0.4 mg capsule Take 1 Cap by mouth nightly.     nebivolol (BYSTOLIC) 20 mg tablet TAKE ONE TABLET BY MOUTH DAILY (Patient taking differently: 10 mg. TAKE ONE TABLET BY MOUTH DAILY)    multivitamin (ONE A DAY) tablet Take 1 Tab by mouth daily.  aspirin delayed-release 81 mg tablet Take 1 Tab by mouth daily. (Patient taking differently: Take 81 mg by mouth.)    fish oil-omega-3 fatty acids (FISH OIL) 340-1,000 mg capsule Take 1 Cap by mouth daily.  saw palmetto 160 mg cap Take 160 mg by mouth three (3) times daily. No current facility-administered medications for this visit. I have reviewed the nurses notes, vitals, problem list, allergy list, medical history, family, social history and medications. REVIEW OF SYMPTOMS      General: Pt denies excessive weight gain or loss. Pt is able to conduct ADL's  HEENT: Denies blurred vision, headaches, hearing loss, epistaxis and difficulty swallowing. Respiratory: Denies cough, congestion, shortness of breath, LOMELI, wheezing or stridor. Cardiovascular: Denies precordial pain, palpitations, edema or PND  Gastrointestinal: Denies poor appetite, indigestion, abdominal pain or blood in stool  Genitourinary: Denies hematuria, dysuria, increased urinary frequency  Musculoskeletal: Denies joint pain or swelling from muscles or joints  Neurologic: Denies tremor, paresthesias, headache, or sensory motor disturbance  Psychiatric: Denies confusion, insomnia, depression  Integumentray: Denies rash, itching or ulcers. Hematologic: Denies easy bruising, bleeding       PHYSICAL EXAMINATION      There were no vitals filed for this visit. General: Well developed, in no acute distress. HEENT: No jaundice, oral mucosa moist, no oral ulcers  Neck: Supple, no stiffness, no lymphadenopathy, supple  Heart:  Normal S1/S2 negative S3 or S4.  Regular, no murmur, gallop or rub, no jugular venous distention  Respiratory: Clear bilaterally x 4, no wheezing or rales  Abdomen:   Soft, non-tender, bowel sounds are active.   Extremities:  No edema, normal cap refill, no cyanosis. Musculoskeletal: No clubbing, no deformities  Neuro: A&Ox3, speech clear, gait stable, cooperative, no focal neurologic deficits  Skin: Skin color is normal. No rashes or lesions. Non diaphoretic, moist.  Vascular: 2+ pulses symmetric in all extremities       DIAGNOSTIC DATA      EKG:        LABORATORY DATA      Lab Results   Component Value Date/Time    WBC 5.6 08/06/2018 08:13 AM    HGB 15.2 08/06/2018 08:13 AM    HCT 45.1 08/06/2018 08:13 AM    PLATELET 880 08/72/4342 08:13 AM    MCV 95.1 08/06/2018 08:13 AM      Lab Results   Component Value Date/Time    Sodium 139 03/29/2019 10:42 AM    Potassium 4.7 03/29/2019 10:42 AM    Chloride 103 03/29/2019 10:42 AM    CO2 24 03/29/2019 10:42 AM    Anion gap 9 08/06/2018 08:13 AM    Glucose 105 (H) 03/29/2019 10:42 AM    BUN 21 03/29/2019 10:42 AM    Creatinine 0.95 03/29/2019 10:42 AM    BUN/Creatinine ratio 22 03/29/2019 10:42 AM    GFR est AA 88 03/29/2019 10:42 AM    GFR est non-AA 76 03/29/2019 10:42 AM    Calcium 9.0 03/29/2019 10:42 AM    Bilirubin, total 1.9 (H) 11/09/2017 08:24 AM    AST (SGOT) 26 11/09/2017 08:24 AM    Alk. phosphatase 85 11/09/2017 08:24 AM    Protein, total 7.0 11/09/2017 08:24 AM    Albumin 4.5 11/09/2017 08:24 AM    A-G Ratio 1.8 11/09/2017 08:24 AM    ALT (SGPT) 22 11/09/2017 08:24 AM           ASSESSMENT      1. Atrial fibrillation              A. Long-standing persistent              B. Poorly tolerant of antiplatelet therapy                                                  C. WATCHMAN  2. PPM                A. Single chamber              B. [place] Sci  3. Lead malfunction              A. Insulation breath        PLAN     Continue monitoring in device clinic. FOLLOW-UP     1 year      Thank you, Jamel Nye DO for allowing me to participate in the care of this extraordinarily pleasant male. Please do not hesitate to contact me for further questions/concerns.          Nickie Gerard MD  Cardiac Electrophysiology / Cardiology    [address] [facility] [address]  [address] [facility], [facility], [place] [address]  Emigdio Gallegos, [address]  [phone] / [phone] Fax   [phone] / [phone] Fax

## 2019-08-26 ENCOUNTER — OFFICE VISIT (OUTPATIENT)
Dept: CARDIOLOGY CLINIC | Age: 79
End: 2019-08-26

## 2019-08-26 VITALS
DIASTOLIC BLOOD PRESSURE: 90 MMHG | BODY MASS INDEX: 24 KG/M2 | HEART RATE: 60 BPM | OXYGEN SATURATION: 96 % | WEIGHT: 187 LBS | SYSTOLIC BLOOD PRESSURE: 150 MMHG | HEIGHT: 74 IN

## 2019-08-26 DIAGNOSIS — I48.91 ATRIAL FIBRILLATION, UNSPECIFIED TYPE (HCC): Primary | ICD-10-CM

## 2019-08-26 NOTE — PROGRESS NOTES
Patient says he has no cardiac complaints today       Visit Vitals  /90 (BP 1 Location: Left arm, BP Patient Position: Sitting)   Pulse 60   Ht 6' 2\" (1.88 m)   Wt 187 lb (84.8 kg)   SpO2 96%   BMI 24.01 kg/m²       PATIENT HAS NOT TAKEN BLOOD PRESSURE MEDICATION

## 2019-10-06 DIAGNOSIS — I10 ESSENTIAL HYPERTENSION WITH GOAL BLOOD PRESSURE LESS THAN 140/90: ICD-10-CM

## 2019-10-06 DIAGNOSIS — I48.20 CHRONIC ATRIAL FIBRILLATION (HCC): ICD-10-CM

## 2019-10-08 RX ORDER — NEBIVOLOL 20 MG/1
TABLET ORAL
Qty: 30 TAB | Refills: 0 | Status: SHIPPED | OUTPATIENT
Start: 2019-10-08 | End: 2019-11-05 | Stop reason: SDUPTHER

## 2019-11-05 DIAGNOSIS — I48.20 CHRONIC ATRIAL FIBRILLATION (HCC): ICD-10-CM

## 2019-11-05 DIAGNOSIS — I10 ESSENTIAL HYPERTENSION WITH GOAL BLOOD PRESSURE LESS THAN 140/90: ICD-10-CM

## 2019-11-05 RX ORDER — NEBIVOLOL 20 MG/1
TABLET ORAL
Qty: 30 TAB | Refills: 5 | Status: SHIPPED | OUTPATIENT
Start: 2019-11-05 | End: 2020-07-14 | Stop reason: SDUPTHER

## 2019-12-10 ENCOUNTER — OFFICE VISIT (OUTPATIENT)
Dept: CARDIOLOGY CLINIC | Age: 79
End: 2019-12-10

## 2019-12-10 DIAGNOSIS — Z95.0 CARDIAC PACEMAKER IN SITU: Primary | ICD-10-CM

## 2020-01-09 ENCOUNTER — HOSPITAL ENCOUNTER (OUTPATIENT)
Dept: LAB | Age: 80
Discharge: HOME | End: 2020-01-09

## 2020-01-09 ENCOUNTER — OFFICE VISIT (OUTPATIENT)
Dept: FAMILY MEDICINE CLINIC | Age: 80
End: 2020-01-09

## 2020-01-09 VITALS
WEIGHT: 184 LBS | OXYGEN SATURATION: 98 % | HEIGHT: 74 IN | RESPIRATION RATE: 16 BRPM | HEART RATE: 60 BPM | DIASTOLIC BLOOD PRESSURE: 85 MMHG | BODY MASS INDEX: 23.61 KG/M2 | SYSTOLIC BLOOD PRESSURE: 141 MMHG | TEMPERATURE: 97.8 F

## 2020-01-09 DIAGNOSIS — R73.03 PREDIABETES: ICD-10-CM

## 2020-01-09 DIAGNOSIS — Z00.00 MEDICARE ANNUAL WELLNESS VISIT, SUBSEQUENT: ICD-10-CM

## 2020-01-09 DIAGNOSIS — I10 ESSENTIAL HYPERTENSION: ICD-10-CM

## 2020-01-09 DIAGNOSIS — Z13.39 SCREENING FOR ALCOHOLISM: ICD-10-CM

## 2020-01-09 DIAGNOSIS — R35.1 BENIGN PROSTATIC HYPERPLASIA WITH NOCTURIA: ICD-10-CM

## 2020-01-09 DIAGNOSIS — R73.03 PREDIABETES: Primary | ICD-10-CM

## 2020-01-09 DIAGNOSIS — J06.9 VIRAL URI: ICD-10-CM

## 2020-01-09 DIAGNOSIS — N40.1 BENIGN PROSTATIC HYPERPLASIA WITH NOCTURIA: ICD-10-CM

## 2020-01-09 LAB
ANION GAP SERPL CALC-SCNC: 6 MMOL/L (ref 5–15)
BASOPHILS # BLD: 0 K/UL (ref 0–0.1)
BASOPHILS NFR BLD: 1 % (ref 0–1)
BUN SERPL-MCNC: 24 MG/DL (ref 6–20)
BUN/CREAT SERPL: 22 (ref 12–20)
CALCIUM SERPL-MCNC: 8.7 MG/DL (ref 8.5–10.1)
CHLORIDE SERPL-SCNC: 109 MMOL/L (ref 97–108)
CO2 SERPL-SCNC: 26 MMOL/L (ref 21–32)
CREAT SERPL-MCNC: 1.09 MG/DL (ref 0.7–1.3)
DIFFERENTIAL METHOD BLD: NORMAL
EOSINOPHIL # BLD: 0.1 K/UL (ref 0–0.4)
EOSINOPHIL NFR BLD: 2 % (ref 0–7)
ERYTHROCYTE [DISTWIDTH] IN BLOOD BY AUTOMATED COUNT: 13.2 % (ref 11.5–14.5)
EST. AVERAGE GLUCOSE BLD GHB EST-MCNC: 131 MG/DL
GLUCOSE SERPL-MCNC: 105 MG/DL (ref 65–100)
HBA1C MFR BLD: 6.2 % (ref 4–5.6)
HCT VFR BLD AUTO: 45.7 % (ref 36.6–50.3)
HGB BLD-MCNC: 15 G/DL (ref 12.1–17)
IMM GRANULOCYTES # BLD AUTO: 0 K/UL (ref 0–0.04)
IMM GRANULOCYTES NFR BLD AUTO: 0 % (ref 0–0.5)
LYMPHOCYTES # BLD: 1.4 K/UL (ref 0.8–3.5)
LYMPHOCYTES NFR BLD: 27 % (ref 12–49)
MCH RBC QN AUTO: 31.8 PG (ref 26–34)
MCHC RBC AUTO-ENTMCNC: 32.8 G/DL (ref 30–36.5)
MCV RBC AUTO: 97 FL (ref 80–99)
MONOCYTES # BLD: 0.3 K/UL (ref 0–1)
MONOCYTES NFR BLD: 6 % (ref 5–13)
NEUTS SEG # BLD: 3.3 K/UL (ref 1.8–8)
NEUTS SEG NFR BLD: 64 % (ref 32–75)
NRBC # BLD: 0 K/UL (ref 0–0.01)
NRBC BLD-RTO: 0 PER 100 WBC
PLATELET # BLD AUTO: 176 K/UL (ref 150–400)
PMV BLD AUTO: 11 FL (ref 8.9–12.9)
POTASSIUM SERPL-SCNC: 4.6 MMOL/L (ref 3.5–5.1)
RBC # BLD AUTO: 4.71 M/UL (ref 4.1–5.7)
SODIUM SERPL-SCNC: 141 MMOL/L (ref 136–145)
WBC # BLD AUTO: 5.2 K/UL (ref 4.1–11.1)

## 2020-01-09 RX ORDER — TAMSULOSIN HYDROCHLORIDE 0.4 MG/1
0.8 CAPSULE ORAL
Qty: 180 CAP | Refills: 3 | Status: SHIPPED | OUTPATIENT
Start: 2020-01-09 | End: 2020-10-28 | Stop reason: SDUPTHER

## 2020-01-09 NOTE — PROGRESS NOTES
Chief Complaint   Patient presents with    Follow-up    Nasal Congestion     Patient presents in office today for 6 month f/u. Has c/o nasal congestion for about a month. Treating with Mucinex with no relief. No other concerns. 1. Have you been to the ER, urgent care clinic since your last visit? Hospitalized since your last visit? No    2. Have you seen or consulted any other health care providers outside of the [address] [place] [address] since your last visit? Include any pap smears or colon screening.  No    Learning Assessment 6/29/2018   PRIMARY LEARNER Patient   HIGHEST LEVEL OF EDUCATION - PRIMARY LEARNER  GRADUATED HIGH SCHOOL OR GED   BARRIERS PRIMARY LEARNER NONE   CO-LEARNER CAREGIVER No   PRIMARY LANGUAGE ENGLISH   LEARNER PREFERENCE PRIMARY DEMONSTRATION     -   ANSWERED BY Patient   RELATIONSHIP SELF

## 2020-01-09 NOTE — PROGRESS NOTES
Villa Alberto is a [de-identified] y.o. male   Chief Complaint   Patient presents with    Follow-up    Nasal Congestion    pt here for follow up for prediabetes and last check was 6.1. Lost 3 lbs since last visit, tries to follow a healthy diet. HTN remains controlled for age jermaine lcheck a bmp. Pt also with some sinus congestion using mucinex without much relief. No fever or chills also started with a humidifier in the bedroom at night. No coughing or sob. Pt also with BPH wakes up 3-4x a night, using flomax 1 tab nightly. he is a [de-identified]y.o. year old male who presents for evalution. Reviewed PmHx, RxHx, FmHx, SocHx, AllgHx and updated and dated in the chart. Review of Systems - negative except as listed above in the HPI    Objective:     Vitals:    01/09/20 1012   BP: 141/85   Pulse: 60   Resp: 16   Temp: 97.8 °F (36.6 °C)   TempSrc: Oral   SpO2: 98%   Weight: 184 lb (83.5 kg)   Height: 6' 2\" (1.88 m)       Current Outpatient Medications   Medication Sig    tamsulosin (FLOMAX) 0.4 mg capsule Take 2 Caps by mouth nightly.  nebivolol (BYSTOLIC) 20 mg tablet TAKE ONE TABLET BY MOUTH DAILY    multivitamin (ONE A DAY) tablet Take 1 Tab by mouth daily.  aspirin delayed-release 81 mg tablet Take 1 Tab by mouth daily. (Patient taking differently: Take 81 mg by mouth.)    fish oil-omega-3 fatty acids (FISH OIL) 340-1,000 mg capsule Take 1 Cap by mouth daily.  saw palmetto 160 mg cap Take 160 mg by mouth three (3) times daily. No current facility-administered medications for this visit. Physical Examination: General appearance - alert, well appearing, and in no distress  Mental status - alert, oriented to person, place, and time  Chest - clear to auscultation, no wheezes, rales or rhonchi, symmetric air entry  Heart - normal rate, regular rhythm, normal S1, S2, no murmurs, rubs, clicks or gallops      Assessment/ Plan:   Diagnoses and all orders for this visit:    1.  Prediabetes  - HEMOGLOBIN A1C WITH EAG; Future    2. Essential hypertension  -     METABOLIC PANEL, BASIC; Future  @ goal on meds  3. Benign prostatic hyperplasia with nocturia  -     tamsulosin (FLOMAX) 0.4 mg capsule; Take 2 Caps by mouth nightly. Increase for better affect, tolerating 1 tab with no s/e  4. Medicare annual wellness visit, subsequent  -     CBC WITH AUTOMATED DIFF; Future    5. Screening for alcoholism  -     [place] ANNUAL ALCOHOL SCREEN 15 MIN  6. Viral URI  Supportive care discussed with pt       Follow-up and Dispositions    · Return in about 6 months (around 7/9/2020), or if symptoms worsen or fail to improve. I have discussed the diagnosis with the patient and the intended plan as seen in the above orders. The patient has received an after-visit summary and questions were answered concerning future plans. Pt conveyed understanding of plan. Medication Side Effects and Warnings were discussed with patient      Celia Aster, DO     This is the Subsequent Medicare Annual Wellness Exam, performed 12 months or more after the Initial AWV or the last Subsequent AWV    I have reviewed the patient's medical history in detail and updated the computerized patient record.      History     Patient Active Problem List   Diagnosis Code    Essential hypertension I10    Chronic atrial fibrillation I48.20    Pacemaker Z95.0    ACP (advance care planning) Z71.89    Anticoagulant drug declined Z53.20    Atrial fibrillation ([facility] [address] [address] I48.91    Prediabetes R73.03     Past Medical History:   Diagnosis Date    A-fib [facility])     discovered 2008    Arthritis     Knees    Hypertension     Pacemaker     placed  Nov 2011 [facility])    Sick sinus syndrome [facility])     heart monitor showed long pauses (> 4 sec) in 2011      Past Surgical History:   Procedure Laterality Date    HX HERNIA REPAIR      HX PACEMAKER       Current Outpatient Medications   Medication Sig Dispense Refill    tamsulosin (FLOMAX) 0.4 mg capsule Take 2 Caps by mouth nightly. 180 Cap 3    nebivolol (BYSTOLIC) 20 mg tablet TAKE ONE TABLET BY MOUTH DAILY 30 Tab 5    multivitamin (ONE A DAY) tablet Take 1 Tab by mouth daily.  aspirin delayed-release 81 mg tablet Take 1 Tab by mouth daily. (Patient taking differently: Take 81 mg by mouth.) 60 Tab 0    fish oil-omega-3 fatty acids (FISH OIL) 340-1,000 mg capsule Take 1 Cap by mouth daily.  saw palmetto 160 mg cap Take 160 mg by mouth three (3) times daily. No Known Allergies    Family History   Problem Relation Age of Onset    Diabetes Maternal Aunt     Hypertension Mother     Dementia Father      Social History     Tobacco Use    Smoking status: Never Smoker    Smokeless tobacco: Never Used   Substance Use Topics    Alcohol use: No     Alcohol/week: 0.0 standard drinks       Depression Risk Factor Screening:     3 most recent PHQ Screens 1/9/2020   Little interest or pleasure in doing things Not at all   Feeling down, depressed, irritable, or hopeless Not at all   Total Score PHQ 2 0       Alcohol Risk Factor Screening (MALE > 65): Do you average more 1 drink per night or more than 7 drinks a week: No    In the past three months have you have had more than 4 drinks containing alcohol on one occasion: No      Functional Ability and Level of Safety:   Hearing: Hearing is good. Activities of Daily Living: The home contains: no safety equipment. Patient does total self care    Ambulation: with no difficulty    Fall Risk:  Fall Risk Assessment, last 12 mths 8/26/2019   Able to walk? Yes   Fall in past 12 months?  Yes   Number of falls in past 12 months 1       Abuse Screen:  Patient is not abused    Cognitive Screening   Has your family/caregiver stated any concerns about your memory: no  Cognitive Screening: Normal - Clock Drawing Test    Patient Care Team   Patient Care Team:  Mohit Casper DO as PCP - General (Family Practice)  Mohit Casper DO as PCP - [facility] Provider  Alfie Loza MD (Cardiology)  Otf Johns MD as Surgeon (Cardiothoracic Surgery)  Justin Segovia MD (Cardiology)    Assessment/Plan   Education and counseling provided:  Are appropriate based on today's review and evaluation    Diagnoses and all orders for this visit:    1. Prediabetes  -     HEMOGLOBIN A1C WITH EAG; Future    2. Essential hypertension  -     METABOLIC PANEL, BASIC; Future    3. Benign prostatic hyperplasia with nocturia  -     tamsulosin (FLOMAX) 0.4 mg capsule; Take 2 Caps by mouth nightly. 4. Medicare annual wellness visit, subsequent  -     CBC WITH AUTOMATED DIFF; Future    5. Screening for alcoholism  -     [place] ANNUAL ALCOHOL SCREEN 15 MIN    6. Viral URI        Health Maintenance Due   Topic Date Due    Shingrix Vaccine Age 49> (1 of 2) 03/30/1989    GLAUCOMA SCREENING Q2Y  03/30/2004     I have discussed the diagnosis with the patient and the intended plan as seen in the above orders. The patient has received an after-visit summary and questions were answered concerning future plans. Pt conveyed understanding of plan.       Dr Adams Inch

## 2020-01-09 NOTE — PATIENT INSTRUCTIONS
A Healthy Lifestyle: Care Instructions  Your Care Instructions    A healthy lifestyle can help you feel good, stay at a healthy weight, and have plenty of energy for both work and play. A healthy lifestyle is something you can share with your whole family. A healthy lifestyle also can lower your risk for serious health problems, such as high blood pressure, heart disease, and diabetes. You can follow a few steps listed below to improve your health and the health of your family. Follow-up care is a key part of your treatment and safety. Be sure to make and go to all appointments, and call your doctor if you are having problems. It's also a good idea to know your test results and keep a list of the medicines you take. How can you care for yourself at home? · Do not eat too much sugar, fat, or fast foods. You can still have dessert and treats now and then. The goal is moderation. · Start small to improve your eating habits. Pay attention to portion sizes, drink less juice and soda pop, and eat more fruits and vegetables. ? Eat a healthy amount of food. A 3-ounce serving of meat, for example, is about the size of a deck of cards. Fill the rest of your plate with vegetables and whole grains. ? Limit the amount of soda and sports drinks you have every day. Drink more water when you are thirsty. ? Eat at least 5 servings of fruits and vegetables every day. It may seem like a lot, but it is not hard to reach this goal. A serving or helping is 1 piece of fruit, 1 cup of vegetables, or 2 cups of leafy, raw vegetables. Have an apple or some carrot sticks as an afternoon snack instead of a candy bar. Try to have fruits and/or vegetables at every meal.  · Make exercise part of your daily routine. You may want to start with simple activities, such as walking, bicycling, or slow swimming. Try to be active 30 to 60 minutes every day. You do not need to do all 30 to 60 minutes all at once.  For example, you can exercise 3 times a day for 10 or 20 minutes. Moderate exercise is safe for most people, but it is always a good idea to talk to your doctor before starting an exercise program.  · Keep moving. Marilu Folk the lawn, work in the garden, or [employer]. Take the stairs instead of the elevator at work. · If you smoke, quit. People who smoke have an increased risk for heart attack, stroke, cancer, and other lung illnesses. Quitting is hard, but there are ways to boost your chance of quitting tobacco for good. ? Use nicotine gum, patches, or lozenges. ? Ask your doctor about stop-smoking programs and medicines. ? Keep trying. In addition to reducing your risk of diseases in the future, you will notice some benefits soon after you stop using tobacco. If you have shortness of breath or asthma symptoms, they will likely get better within a few weeks after you quit. · Limit how much alcohol you drink. Moderate amounts of alcohol (up to 2 drinks a day for men, 1 drink a day for women) are okay. But drinking too much can lead to liver problems, high blood pressure, and other health problems. Family health  If you have a family, there are many things you can do together to improve your health. · Eat meals together as a family as often as possible. · Eat healthy foods. This includes fruits, vegetables, lean meats and dairy, and whole grains. · Include your family in your fitness plan. Most people think of activities such as jogging or tennis as the way to fitness, but there are many ways you and your family can be more active. Anything that makes you breathe hard and gets your heart pumping is exercise. Here are some tips:  ? Walk to do errands or to take your child to school or the bus.  ? Go for a family bike ride after dinner instead of watching TV. Where can you learn more? Go to http://frances-abby.info/. Enter G278 in the search box to learn more about \"A Healthy Lifestyle: Care Instructions. \"  Current as of: May 28, 2019  Content Version: 12.2  © 9760-1963 [employer]. Care instructions adapted under license by [employer] (which disclaims liability or warranty for this information). If you have questions about a medical condition or this instruction, always ask your healthcare professional. [address] any warranty or liability for your use of this information. Medicare Wellness Visit, Male    The best way to live healthy is to have a lifestyle where you eat a well-balanced diet, exercise regularly, limit alcohol use, and quit all forms of tobacco/nicotine, if applicable. Regular preventive services are another way to keep healthy. Preventive services (vaccines, screening tests, monitoring & exams) can help personalize your care plan, which helps you manage your own care. Screening tests can find health problems at the earliest stages, when they are easiest to treat. [facility] follows the current, evidence-based guidelines published by the [facility] Anibal Buckley ([facility]) when recommending preventive services for our patients. Because we follow these guidelines, sometimes recommendations change over time as research supports it. (For example, a prostate screening blood test is no longer routinely recommended for men with no symptoms). Of course, you and your doctor may decide to screen more often for some diseases, based on your risk and co-morbidities (chronic disease you are already diagnosed with). Preventive services for you include:  - Medicare offers their members a free annual wellness visit, which is time for you and your primary care provider to discuss and plan for your preventive service needs. Take advantage of this benefit every year!  -All adults over age 72 should receive the recommended pneumonia vaccines.  Current USPSTF guidelines recommend a series of two vaccines for the best pneumonia protection.   -All adults should have a flu vaccine yearly and tetanus vaccine every 10 years.  -All adults age 48 and older should receive the shingles vaccines (series of two vaccines). -All adults age 38-68 who are overweight should have a diabetes screening test once every three years.   -Other screening tests & preventive services for persons with diabetes include: an eye exam to screen for diabetic retinopathy, a kidney function test, a foot exam, and stricter control over your cholesterol.   -Cardiovascular screening for adults with routine risk involves an electrocardiogram (ECG) at intervals determined by the provider.   -Colorectal cancer screening should be done for adults age 54-65 with no increased risk factors for colorectal cancer. There are a number of acceptable methods of screening for this type of cancer. Each test has its own benefits and drawbacks. Discuss with your provider what is most appropriate for you during your annual wellness visit. The different tests include: colonoscopy (considered the best screening method), a fecal occult blood test, a fecal DNA test, and sigmoidoscopy.  -All adults born between [facility] should be screened once for Hepatitis C.  -An Abdominal Aortic Aneurysm (AAA) Screening is recommended for men age 73-68 who has ever smoked in their lifetime.      Here is a list of your current Health Maintenance items (your personalized list of preventive services) with a due date:  Health Maintenance Due   Topic Date Due    Shingles Vaccine (1 of 2) 03/30/1989    Glaucoma Screening   03/30/2004    Flu Vaccine  08/01/2019    Annual Well Visit  11/14/2019

## 2020-01-10 ENCOUNTER — TELEPHONE (OUTPATIENT)
Dept: FAMILY MEDICINE CLINIC | Age: 80
End: 2020-01-10

## 2020-01-10 NOTE — TELEPHONE ENCOUNTER
Called and spoke with wife Lianet Duran and advised that a Norma Acevedo will not help with viral URI and to use mucinex OTC. Wife verbalized understanding.

## 2020-01-10 NOTE — TELEPHONE ENCOUNTER
Patients wife Humphrey Clark called and states that they would like a zpac or something called in to help with his cold. Please send to [facility].

## 2020-01-17 NOTE — PROGRESS NOTES
Called and spoke with patient in reference to labs. Verbalized understanding.  Patient is already scheduled for 7/9/20

## 2020-01-21 ENCOUNTER — TELEPHONE (OUTPATIENT)
Dept: FAMILY MEDICINE CLINIC | Age: 80
End: 2020-01-21

## 2020-01-21 RX ORDER — AMOXICILLIN AND CLAVULANATE POTASSIUM 875; 125 MG/1; MG/1
1 TABLET, FILM COATED ORAL EVERY 12 HOURS
Qty: 14 TAB | Refills: 0 | Status: SHIPPED | OUTPATIENT
Start: 2020-01-21 | End: 2020-01-28

## 2020-01-21 NOTE — TELEPHONE ENCOUNTER
----- Message from [facility] sent at 1/21/2020  3:42 PM EST -----  Regarding: Dr. Rogelio Handley: [phone]  Caller's first and last name: n/a  Reason for call: Patient stated that provider has prescribed him some medications for flu, and also advised patient to use a humidifier. Patient stated that they are not working and he has requested that provider call in another prescription for him.   Callback required yes/no and why: yes  Best contact number(s): [phone]  Details to clarify the request: n/a

## 2020-03-17 ENCOUNTER — OFFICE VISIT (OUTPATIENT)
Dept: CARDIOLOGY CLINIC | Age: 80
End: 2020-03-17

## 2020-03-17 DIAGNOSIS — Z95.0 CARDIAC PACEMAKER IN SITU: Primary | ICD-10-CM

## 2020-06-18 ENCOUNTER — VIRTUAL VISIT (OUTPATIENT)
Dept: FAMILY MEDICINE CLINIC | Age: 80
End: 2020-06-18

## 2020-06-18 DIAGNOSIS — L23.7 POISON IVY DERMATITIS: Primary | ICD-10-CM

## 2020-06-18 DIAGNOSIS — R73.03 PREDIABETES: ICD-10-CM

## 2020-06-18 DIAGNOSIS — R41.3 MEMORY LOSS: ICD-10-CM

## 2020-06-18 RX ORDER — METHYLPREDNISOLONE 4 MG/1
TABLET ORAL
Qty: 1 DOSE PACK | Refills: 0 | Status: SHIPPED | OUTPATIENT
Start: 2020-06-18 | End: 2021-09-28 | Stop reason: ALTCHOICE

## 2020-06-18 NOTE — PROGRESS NOTES
Lakisha Calabrese is a 79 y.o. male   Chief Complaint   Patient presents with    Follow-up    Pt for his month follow up and states he has some red bumps on his skin. Saw pharm at [facility] and was told he has poison ivy/oak on his skin and using calamine lotion. Pt states it is all over his legs. Pt daughter had contacted office regarding patients memory issues. Pt states these started after he hit his head and had a concussion in December 2018. Daughter is concerned regarding his memory loss. Pt is driving and states never gets lost going places. Pt is a and o x 3. Had reported forgetting hat he is talking about mid conversation. Received permission to speak with wife and she states some days his memory is fine and other days it is not. Will also sleep frequently. Pt does exercise. Wife states he sleeps constantly. States over last 6 months has noticed a decrease in energy and memory. States he forgets what he is going to say. Wife states driving is not that good. Wife wants him to start prevagen. Advised against starting at this time due to unproven. Would first work up. Wife will discuss seeing neuro with /pt      Pt is prediabetic last A1C was 6.2 and is due for recheck. Pt does exercise, goes walking regularly but is largely sedentary otherwise as prev stated. Lakisha Calabrese is a 79 y.o. male evaluated via audio only technology on 6/18/2020. Consent: He and/or his health care decision maker is aware that he may receive a bill for this audio only encounter, depending on his insurance coverage, and has provided verbal consent to proceed: Yes    I communicated with the patient and/or health care decision maker about the nature and details of the following:  Assessment & Plan:   Diagnoses and all orders for this visit:    1. Poison ivy dermatitis  -     methylPREDNISolone (MEDROL DOSEPACK) 4 mg tablet; Take as directed    2.  Memory loss  -     METABOLIC PANEL, COMPREHENSIVE; Future  -     CBC WITH AUTOMATED DIFF; Future  -     TSH 3RD GENERATION; Future  -     VITAMIN B12 & FOLATE; Future  -     REFERRAL TO NEUROLOGY    3. Prediabetes  -     METABOLIC PANEL, COMPREHENSIVE; Future  -     HEMOGLOBIN A1C WITH EAG; Future        12  Subjective: Alma Delia Reece is a 79 y.o. male who was seen for Follow-up      Prior to Admission medications    Medication Sig Start Date End Date Taking? Authorizing Provider   methylPREDNISolone (MEDROL DOSEPACK) 4 mg tablet Take as directed 6/18/20  Yes Jordan Gaxiola, DO   tamsulosin (FLOMAX) 0.4 mg capsule Take 2 Caps by mouth nightly. 1/9/20   Jordan Gaxiola, DO   nebivolol (BYSTOLIC) 20 mg tablet TAKE ONE TABLET BY MOUTH DAILY 11/5/19   Jordan Gaxiola, DO   multivitamin (ONE A DAY) tablet Take 1 Tab by mouth daily. Provider, Historical   aspirin delayed-release 81 mg tablet Take 1 Tab by mouth daily. Patient taking differently: Take 81 mg by mouth. 10/1/18   Hakeem Alexander MD   fish oil-omega-3 fatty acids (FISH OIL) 340-1,000 mg capsule Take 1 Cap by mouth daily. Provider, Historical   saw palmetto 160 mg cap Take 160 mg by mouth three (3) times daily. Provider, Historical     No Known Allergies    Patient Active Problem List   Diagnosis Code    Essential hypertension I10    Chronic atrial fibrillation (HCC) I48.20    Pacemaker Z95.0    ACP (advance care planning) Z71.89    Anticoagulant drug declined Z53.20    Atrial fibrillation ([facility] [address] I48.91    Prediabetes R73.03       ROS    I affirm this is a Patient-Initiated Episode with a Patient who has not had a related appointment within my department in the past 7 days or scheduled within the next 24 hours.     Total Time: 21-30 minutes    Note: not billable if this call serves to triage the patient into an appointment for the relevant concern      Roni Bond DO

## 2020-06-23 ENCOUNTER — OFFICE VISIT (OUTPATIENT)
Dept: CARDIOLOGY CLINIC | Age: 80
End: 2020-06-23

## 2020-06-23 DIAGNOSIS — Z95.0 CARDIAC PACEMAKER IN SITU: Primary | ICD-10-CM

## 2020-06-25 ENCOUNTER — TELEPHONE (OUTPATIENT)
Dept: NEUROLOGY | Age: 80
End: 2020-06-25

## 2020-06-25 NOTE — TELEPHONE ENCOUNTER
----- Message from Imelda Owens sent at 6/25/2020 10:55 AM EDT -----  Regarding: /Telephone  General Message/Vendor Calls    Caller's first and last name:Yaquelin Moya      Reason for call:schedule new pt appt       Callback required yes/no and why:yes      Best contact number(s):[phone] or [phone]      Details to clarify the request:Pt daughter called requesting a call back to schedule a new pt appt for the pt .       Imelda Owens

## 2020-06-25 NOTE — TELEPHONE ENCOUNTER
Called to make appt  Patient and wife would like the [facility] office  I explained  that [facility] office is not open as of yet due to covid-19  He states he would like to wait then and call back if needed

## 2020-06-29 ENCOUNTER — HOSPITAL ENCOUNTER (OUTPATIENT)
Dept: LAB | Age: 80
Discharge: HOME | End: 2020-06-29

## 2020-06-29 DIAGNOSIS — R41.3 MEMORY LOSS: ICD-10-CM

## 2020-06-29 DIAGNOSIS — R73.03 PREDIABETES: ICD-10-CM

## 2020-06-29 LAB
ALBUMIN SERPL-MCNC: 4 G/DL (ref 3.5–5)
ALBUMIN/GLOB SERPL: 1.2 {RATIO} (ref 1.1–2.2)
ALP SERPL-CCNC: 90 U/L (ref 45–117)
ALT SERPL-CCNC: 22 U/L (ref 12–78)
ANION GAP SERPL CALC-SCNC: 6 MMOL/L (ref 5–15)
AST SERPL-CCNC: 17 U/L (ref 15–37)
BASOPHILS # BLD: 0 K/UL (ref 0–0.1)
BASOPHILS NFR BLD: 1 % (ref 0–1)
BILIRUB SERPL-MCNC: 2.5 MG/DL (ref 0.2–1)
BUN SERPL-MCNC: 24 MG/DL (ref 6–20)
BUN/CREAT SERPL: 21 (ref 12–20)
CALCIUM SERPL-MCNC: 8.7 MG/DL (ref 8.5–10.1)
CHLORIDE SERPL-SCNC: 105 MMOL/L (ref 97–108)
CO2 SERPL-SCNC: 28 MMOL/L (ref 21–32)
CREAT SERPL-MCNC: 1.13 MG/DL (ref 0.7–1.3)
DIFFERENTIAL METHOD BLD: NORMAL
EOSINOPHIL # BLD: 0.2 K/UL (ref 0–0.4)
EOSINOPHIL NFR BLD: 3 % (ref 0–7)
ERYTHROCYTE [DISTWIDTH] IN BLOOD BY AUTOMATED COUNT: 14.2 % (ref 11.5–14.5)
EST. AVERAGE GLUCOSE BLD GHB EST-MCNC: 131 MG/DL
FOLATE SERPL-MCNC: 26.9 NG/ML (ref 5–21)
GLOBULIN SER CALC-MCNC: 3.3 G/DL (ref 2–4)
GLUCOSE SERPL-MCNC: 107 MG/DL (ref 65–100)
HBA1C MFR BLD: 6.2 % (ref 4–5.6)
HCT VFR BLD AUTO: 48.9 % (ref 36.6–50.3)
HGB BLD-MCNC: 15.6 G/DL (ref 12.1–17)
IMM GRANULOCYTES # BLD AUTO: 0 K/UL (ref 0–0.04)
IMM GRANULOCYTES NFR BLD AUTO: 0 % (ref 0–0.5)
LYMPHOCYTES # BLD: 1.9 K/UL (ref 0.8–3.5)
LYMPHOCYTES NFR BLD: 32 % (ref 12–49)
MCH RBC QN AUTO: 31.5 PG (ref 26–34)
MCHC RBC AUTO-ENTMCNC: 31.9 G/DL (ref 30–36.5)
MCV RBC AUTO: 98.6 FL (ref 80–99)
MONOCYTES # BLD: 0.5 K/UL (ref 0–1)
MONOCYTES NFR BLD: 9 % (ref 5–13)
NEUTS SEG # BLD: 3.3 K/UL (ref 1.8–8)
NEUTS SEG NFR BLD: 55 % (ref 32–75)
NRBC # BLD: 0 K/UL (ref 0–0.01)
NRBC BLD-RTO: 0 PER 100 WBC
PLATELET # BLD AUTO: 181 K/UL (ref 150–400)
PMV BLD AUTO: 11.4 FL (ref 8.9–12.9)
POTASSIUM SERPL-SCNC: 4.5 MMOL/L (ref 3.5–5.1)
PROT SERPL-MCNC: 7.3 G/DL (ref 6.4–8.2)
RBC # BLD AUTO: 4.96 M/UL (ref 4.1–5.7)
SODIUM SERPL-SCNC: 139 MMOL/L (ref 136–145)
TSH SERPL DL<=0.05 MIU/L-ACNC: 1.49 UIU/ML (ref 0.36–3.74)
VIT B12 SERPL-MCNC: 254 PG/ML (ref 193–986)
WBC # BLD AUTO: 5.9 K/UL (ref 4.1–11.1)

## 2020-06-30 NOTE — PROGRESS NOTES
Prediabetes marker unchanged. Continue to follow lower carb diet and stay active as possible. B12 is normal but low end of normal so please start a b complex vitamin daily. Otherwise labs stable.

## 2020-07-14 DIAGNOSIS — I48.20 CHRONIC ATRIAL FIBRILLATION (HCC): ICD-10-CM

## 2020-07-14 DIAGNOSIS — I10 ESSENTIAL HYPERTENSION WITH GOAL BLOOD PRESSURE LESS THAN 140/90: ICD-10-CM

## 2020-07-15 RX ORDER — NEBIVOLOL 20 MG/1
TABLET ORAL
Qty: 30 TAB | Refills: 5 | Status: SHIPPED | OUTPATIENT
Start: 2020-07-15 | End: 2021-06-15

## 2020-09-17 NOTE — PROGRESS NOTES
HISTORY OF PRESENTING ILLNESS      Nora Damon is a 79 y.o. male with hypertension, long-standing persistent atrial fibrillation, single-chamber PPM s/p WATCHMAN who has had intolerance to plavix/protonix and self-discontinued these meds. PPM interrogation reveals normal functioning. He takes aspirin occasionally.         PAST MEDICAL HISTORY     Past Medical History:   Diagnosis Date    A-fib [facility])     discovered 2008    Arthritis     Knees    Hypertension     Pacemaker     placed  Nov 2011 ([address])    Sick sinus syndrome [facility])     heart monitor showed long pauses (> 4 sec) in 2011           PAST SURGICAL HISTORY     Past Surgical History:   Procedure Laterality Date    HX HERNIA REPAIR      HX PACEMAKER            ALLERGIES     No Known Allergies       FAMILY HISTORY     Family History   Problem Relation Age of Onset    Diabetes Maternal Aunt     Hypertension Mother     Dementia Father     negative for cardiac disease       SOCIAL HISTORY     Social History     Socioeconomic History    Marital status:      Spouse name: Not on file    Number of children: Not on file    Years of education: Not on file    Highest education level: Not on file   Tobacco Use    Smoking status: Never Smoker    Smokeless tobacco: Never Used   Substance and Sexual Activity    Alcohol use: No     Alcohol/week: 0.0 standard drinks    Drug use: No         MEDICATIONS     Current Outpatient Medications   Medication Sig    nebivoloL (Bystolic) 20 mg tablet TAKE ONE TABLET BY MOUTH DAILY    methylPREDNISolone (MEDROL DOSEPACK) 4 mg tablet Take as directed    tamsulosin (FLOMAX) 0.4 mg capsule Take 2 Caps by mouth nightly.  multivitamin (ONE A DAY) tablet Take 1 Tab by mouth daily.  aspirin delayed-release 81 mg tablet Take 1 Tab by mouth daily. (Patient taking differently: Take 81 mg by mouth.)    fish oil-omega-3 fatty acids (FISH OIL) 340-1,000 mg capsule Take 1 Cap by mouth daily.  saw palmetto 160 mg cap Take 160 mg by mouth three (3) times daily. No current facility-administered medications for this visit. I have reviewed the nurses notes, vitals, problem list, allergy list, medical history, family, social history and medications. REVIEW OF SYMPTOMS      General: Pt denies excessive weight gain or loss. Pt is able to conduct ADL's  HEENT: Denies blurred vision, headaches, hearing loss, epistaxis and difficulty swallowing. Respiratory: Denies cough, congestion, shortness of breath, LOMELI, wheezing or stridor. Cardiovascular: Denies precordial pain, palpitations, edema or PND  Gastrointestinal: Denies poor appetite, indigestion, abdominal pain or blood in stool  Genitourinary: Denies hematuria, dysuria, increased urinary frequency  Musculoskeletal: Denies joint pain or swelling from muscles or joints  Neurologic: Denies tremor, paresthesias, headache, or sensory motor disturbance  Psychiatric: Denies confusion, insomnia, depression  Integumentray: Denies rash, itching or ulcers. Hematologic: Denies easy bruising, bleeding       PHYSICAL EXAMINATION      Vitals: see vitals section  General: Well developed, in no acute distress. HEENT: No jaundice, oral mucosa moist, no oral ulcers  Neck: Supple, no stiffness, no lymphadenopathy, supple  Heart:  Normal S1/S2 negative S3 or S4. Regular, no murmur, gallop or rub, no jugular venous distention  Respiratory: Clear bilaterally x 4, no wheezing or rales  Abdomen:   Soft, non-tender, bowel sounds are active. Extremities:  No edema, normal cap refill, no cyanosis. Left arm vascular ectasia  Musculoskeletal: No clubbing, no deformities  Neuro: A&Ox3, speech clear, gait stable, cooperative, no focal neurologic deficits  Skin: Skin color is normal. No rashes or lesions.  Non diaphoretic, moist.  Vascular: 2+ pulses symmetric in all extremities       DIAGNOSTIC DATA      EKG:        LABORATORY DATA      Lab Results   Component Value Date/Time    WBC 5.9 06/29/2020 10:21 AM    HGB 15.6 06/29/2020 10:21 AM    HCT 48.9 06/29/2020 10:21 AM    PLATELET 043 49/93/1089 10:21 AM    MCV 98.6 06/29/2020 10:21 AM      Lab Results   Component Value Date/Time    Sodium 139 06/29/2020 10:21 AM    Potassium 4.5 06/29/2020 10:21 AM    Chloride 105 06/29/2020 10:21 AM    CO2 28 06/29/2020 10:21 AM    Anion gap 6 06/29/2020 10:21 AM    Glucose 107 (H) 06/29/2020 10:21 AM    BUN 24 (H) 06/29/2020 10:21 AM    Creatinine 1.13 06/29/2020 10:21 AM    BUN/Creatinine ratio 21 (H) 06/29/2020 10:21 AM    GFR est AA >60 06/29/2020 10:21 AM    GFR est non-AA >60 06/29/2020 10:21 AM    Calcium 8.7 06/29/2020 10:21 AM    Bilirubin, total 2.5 (H) 06/29/2020 10:21 AM    Alk. phosphatase 90 06/29/2020 10:21 AM    Protein, total 7.3 06/29/2020 10:21 AM    Albumin 4.0 06/29/2020 10:21 AM    Globulin 3.3 06/29/2020 10:21 AM    A-G Ratio 1.2 06/29/2020 10:21 AM    ALT (SGPT) 22 06/29/2020 10:21 AM           ASSESSMENT      1. Atrial fibrillation              A. Long-standing persistent              B. Poorly tolerant of antiplatelet therapy              C. WATCHMAN  2. Pacemaker                A. Single chamber              B. [employer]  3. Lead malfunction              A. Insulation breach  4. 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 It occurred several years after his pacemaker lead revision was performed in 2015. I do not suspect that this is being driven by his left atrial appendage occlusion. Discussed referral for evaluation with vascular surgery however patient declined. Continue monitoring in device clinic. ICD-10-CM ICD-9-CM    1. Cardiac pacemaker in situ  Z95.0 V45.01    2. Atrial fibrillation, unspecified type ([address]  I48.91 427.31    3. Essential hypertension  I10 401.9      No orders of the defined types were placed in this encounter.          FOLLOW-UP     1 year      Thank you, Yoana Tanner DO for allowing me to participate in the care of this extraordinarily pleasant male. Please do not hesitate to contact me for further questions/concerns.          Michelle Stephenson MD  Cardiac Electrophysiology / Cardiology    [facility] Services[facility]aneth[facility].  [address] [place] [address]  [phone] / [phone] Fax   [phone] / [phone] Fax

## 2020-09-18 ENCOUNTER — OFFICE VISIT (OUTPATIENT)
Dept: CARDIOLOGY CLINIC | Age: 80
End: 2020-09-18
Payer: MEDICARE

## 2020-09-18 ENCOUNTER — APPOINTMENT (OUTPATIENT)
Dept: CARDIOLOGY CLINIC | Age: 80
End: 2020-09-18

## 2020-09-18 VITALS
DIASTOLIC BLOOD PRESSURE: 78 MMHG | OXYGEN SATURATION: 97 % | HEIGHT: 74 IN | BODY MASS INDEX: 24.51 KG/M2 | HEART RATE: 60 BPM | WEIGHT: 191 LBS | SYSTOLIC BLOOD PRESSURE: 128 MMHG

## 2020-09-18 DIAGNOSIS — Z95.0 CARDIAC PACEMAKER IN SITU: Primary | ICD-10-CM

## 2020-09-18 DIAGNOSIS — I10 ESSENTIAL HYPERTENSION: ICD-10-CM

## 2020-09-18 DIAGNOSIS — I48.91 ATRIAL FIBRILLATION, UNSPECIFIED TYPE (HCC): ICD-10-CM

## 2020-09-18 PROCEDURE — G8432 DEP SCR NOT DOC, RNG: HCPCS | Performed by: INTERNAL MEDICINE

## 2020-09-18 PROCEDURE — 1101F PT FALLS ASSESS-DOCD LE1/YR: CPT | Performed by: INTERNAL MEDICINE

## 2020-09-18 PROCEDURE — G8536 NO DOC ELDER MAL SCRN: HCPCS | Performed by: INTERNAL MEDICINE

## 2020-09-18 PROCEDURE — G8420 CALC BMI NORM PARAMETERS: HCPCS | Performed by: INTERNAL MEDICINE

## 2020-09-18 PROCEDURE — G8427 DOCREV CUR MEDS BY ELIG CLIN: HCPCS | Performed by: INTERNAL MEDICINE

## 2020-09-18 PROCEDURE — 93000 ELECTROCARDIOGRAM COMPLETE: CPT | Performed by: INTERNAL MEDICINE

## 2020-09-18 PROCEDURE — G8752 SYS BP LESS 140: HCPCS | Performed by: INTERNAL MEDICINE

## 2020-09-18 PROCEDURE — G8754 DIAS BP LESS 90: HCPCS | Performed by: INTERNAL MEDICINE

## 2020-09-18 PROCEDURE — 99215 OFFICE O/P EST HI 40 MIN: CPT | Performed by: INTERNAL MEDICINE

## 2020-09-18 NOTE — PROGRESS NOTES
Elke Rosen is a 79 y.o. male    Visit Vitals  /78 (BP 1 Location: Left arm, BP Patient Position: Sitting)   Pulse 60   Ht 6' 2\" (1.88 m)   Wt 191 lb (86.6 kg)   SpO2 97%   BMI 24.52 kg/m²       Chief Complaint   Patient presents with    Irregular Heart Beat     afib       Chest pain no  SOB no  Dizziness no  Swelling no  Recent hospital visit no  Refills no

## 2020-10-28 ENCOUNTER — OFFICE VISIT (OUTPATIENT)
Dept: FAMILY MEDICINE CLINIC | Age: 80
End: 2020-10-28
Payer: MEDICARE

## 2020-10-28 VITALS
WEIGHT: 189 LBS | BODY MASS INDEX: 24.26 KG/M2 | OXYGEN SATURATION: 96 % | HEIGHT: 74 IN | DIASTOLIC BLOOD PRESSURE: 78 MMHG | RESPIRATION RATE: 16 BRPM | TEMPERATURE: 97.5 F | HEART RATE: 60 BPM | SYSTOLIC BLOOD PRESSURE: 158 MMHG

## 2020-10-28 DIAGNOSIS — R35.1 BENIGN PROSTATIC HYPERPLASIA WITH NOCTURIA: ICD-10-CM

## 2020-10-28 DIAGNOSIS — F51.01 PRIMARY INSOMNIA: ICD-10-CM

## 2020-10-28 DIAGNOSIS — E53.8 LOW SERUM VITAMIN B12: Primary | ICD-10-CM

## 2020-10-28 DIAGNOSIS — N40.1 BENIGN PROSTATIC HYPERPLASIA WITH NOCTURIA: ICD-10-CM

## 2020-10-28 DIAGNOSIS — R79.89 LOW VITAMIN D LEVEL: ICD-10-CM

## 2020-10-28 LAB
25(OH)D3 SERPL-MCNC: 44.3 NG/ML (ref 30–100)
VIT B12 SERPL-MCNC: 304 PG/ML (ref 193–986)

## 2020-10-28 PROCEDURE — G8754 DIAS BP LESS 90: HCPCS | Performed by: FAMILY MEDICINE

## 2020-10-28 PROCEDURE — G8510 SCR DEP NEG, NO PLAN REQD: HCPCS | Performed by: FAMILY MEDICINE

## 2020-10-28 PROCEDURE — G8420 CALC BMI NORM PARAMETERS: HCPCS | Performed by: FAMILY MEDICINE

## 2020-10-28 PROCEDURE — G8427 DOCREV CUR MEDS BY ELIG CLIN: HCPCS | Performed by: FAMILY MEDICINE

## 2020-10-28 PROCEDURE — 1101F PT FALLS ASSESS-DOCD LE1/YR: CPT | Performed by: FAMILY MEDICINE

## 2020-10-28 PROCEDURE — G8753 SYS BP > OR = 140: HCPCS | Performed by: FAMILY MEDICINE

## 2020-10-28 PROCEDURE — 99214 OFFICE O/P EST MOD 30 MIN: CPT | Performed by: FAMILY MEDICINE

## 2020-10-28 PROCEDURE — G8536 NO DOC ELDER MAL SCRN: HCPCS | Performed by: FAMILY MEDICINE

## 2020-10-28 RX ORDER — TAMSULOSIN HYDROCHLORIDE 0.4 MG/1
0.8 CAPSULE ORAL
Qty: 180 CAP | Refills: 3 | Status: SHIPPED | OUTPATIENT
Start: 2020-10-28 | End: 2022-09-30

## 2020-10-28 RX ORDER — TRAZODONE HYDROCHLORIDE 50 MG/1
50 TABLET ORAL
Qty: 30 TAB | Refills: 2 | Status: SHIPPED | OUTPATIENT
Start: 2020-10-28 | End: 2021-12-06

## 2020-10-28 NOTE — PROGRESS NOTES
Chief Complaint   Patient presents with    Sleep Problem     Patient presents in office today with c/o having difficulty sleeping at night. States that he sleeps a lot during the day. No other concerns. 1. Have you been to the ER, urgent care clinic since your last visit? Hospitalized since your last visit? No    2. Have you seen or consulted any other health care providers outside of the [address] [place] [address] since your last visit? Include any pap smears or colon screening.  No    Learning Assessment 6/29/2018   PRIMARY LEARNER Patient   HIGHEST LEVEL OF EDUCATION - PRIMARY LEARNER  GRADUATED HIGH SCHOOL OR GED   BARRIERS PRIMARY LEARNER NONE   CO-LEARNER CAREGIVER No   PRIMARY LANGUAGE ENGLISH   LEARNER PREFERENCE PRIMARY DEMONSTRATION     -   ANSWERED BY Patient   RELATIONSHIP SELF

## 2020-10-28 NOTE — PATIENT INSTRUCTIONS
A Healthy Lifestyle: Care Instructions  Your Care Instructions     A healthy lifestyle can help you feel good, stay at a healthy weight, and have plenty of energy for both work and play. A healthy lifestyle is something you can share with your whole family. A healthy lifestyle also can lower your risk for serious health problems, such as high blood pressure, heart disease, and diabetes. You can follow a few steps listed below to improve your health and the health of your family. Follow-up care is a key part of your treatment and safety. Be sure to make and go to all appointments, and call your doctor if you are having problems. It's also a good idea to know your test results and keep a list of the medicines you take. How can you care for yourself at home? · Do not eat too much sugar, fat, or fast foods. You can still have dessert and treats now and then. The goal is moderation. · Start small to improve your eating habits. Pay attention to portion sizes, drink less juice and soda pop, and eat more fruits and vegetables. ? Eat a healthy amount of food. A 3-ounce serving of meat, for example, is about the size of a deck of cards. Fill the rest of your plate with vegetables and whole grains. ? Limit the amount of soda and sports drinks you have every day. Drink more water when you are thirsty. ? Eat at least 5 servings of fruits and vegetables every day. It may seem like a lot, but it is not hard to reach this goal. A serving or helping is 1 piece of fruit, 1 cup of vegetables, or 2 cups of leafy, raw vegetables. Have an apple or some carrot sticks as an afternoon snack instead of a candy bar. Try to have fruits and/or vegetables at every meal.  · Make exercise part of your daily routine. You may want to start with simple activities, such as walking, bicycling, or slow swimming. Try to be active 30 to 60 minutes every day. You do not need to do all 30 to 60 minutes all at once.  For example, you can exercise 3 times a day for 10 or 20 minutes. Moderate exercise is safe for most people, but it is always a good idea to talk to your doctor before starting an exercise program.  · Keep moving. Jose Zendejas the lawn, work in the garden, or [employer]. Take the stairs instead of the elevator at work. · If you smoke, quit. People who smoke have an increased risk for heart attack, stroke, cancer, and other lung illnesses. Quitting is hard, but there are ways to boost your chance of quitting tobacco for good. ? Use nicotine gum, patches, or lozenges. ? Ask your doctor about stop-smoking programs and medicines. ? Keep trying. In addition to reducing your risk of diseases in the future, you will notice some benefits soon after you stop using tobacco. If you have shortness of breath or asthma symptoms, they will likely get better within a few weeks after you quit. · Limit how much alcohol you drink. Moderate amounts of alcohol (up to 2 drinks a day for men, 1 drink a day for women) are okay. But drinking too much can lead to liver problems, high blood pressure, and other health problems. Family health  If you have a family, there are many things you can do together to improve your health. · Eat meals together as a family as often as possible. · Eat healthy foods. This includes fruits, vegetables, lean meats and dairy, and whole grains. · Include your family in your fitness plan. Most people think of activities such as jogging or tennis as the way to fitness, but there are many ways you and your family can be more active. Anything that makes you breathe hard and gets your heart pumping is exercise. Here are some tips:  ? Walk to do errands or to take your child to school or the bus.  ? Go for a family bike ride after dinner instead of watching TV. Where can you learn more? Go to http://frances-abby.info/  Enter B990 in the search box to learn more about \"A Healthy Lifestyle: Care Instructions. \"  Current as of: January 31, 2020               Content Version: 12.6  © 8961-1725 [employer]. Care instructions adapted under license by [employer] (which disclaims liability or warranty for this information). If you have questions about a medical condition or this instruction, always ask your healthcare professional. [address] any warranty or liability for your use of this information.

## 2020-10-28 NOTE — PROGRESS NOTES
Progress Note    he is a 79y.o. year old male who presents for evalution. Subjective:     Pt states he is having a hard time sleeping at night. He is sleeping during the day. He would like something to help him sleep t night so he can flip his schedule back to normal.  Low B12 on prior check and is taking TC supplement. Would also like to have D checked on supplement and reports hx of low D. Need refill of flomax for BPH and works well. Reviewed PmHx, RxHx, FmHx, SocHx, AllgHx and updated and dated in the chart. Review of Systems - negative except as listed above in the HPI    Objective:     Vitals:    10/28/20 0815 10/28/20 0853   BP: (!) 172/83 (!) 158/78   Pulse: 60    Resp: 16    Temp: 97.5 °F (36.4 °C)    TempSrc: Oral    SpO2: 96%    Weight: 189 lb (85.7 kg)    Height: 6' 2\" (1.88 m)        Current Outpatient Medications   Medication Sig    tamsulosin (Flomax) 0.4 mg capsule Take 2 Caps by mouth nightly.  traZODone (DESYREL) 50 mg tablet Take 1 Tab by mouth nightly as needed for Sleep. May use up to 2 tabs if needed    nebivoloL (Bystolic) 20 mg tablet TAKE ONE TABLET BY MOUTH DAILY    multivitamin (ONE A DAY) tablet Take 1 Tab by mouth daily.  aspirin delayed-release 81 mg tablet Take 1 Tab by mouth daily. (Patient taking differently: Take 81 mg by mouth.)    fish oil-omega-3 fatty acids (FISH OIL) 340-1,000 mg capsule Take 1 Cap by mouth daily.  saw palmetto 160 mg cap Take 160 mg by mouth three (3) times daily.  methylPREDNISolone (MEDROL DOSEPACK) 4 mg tablet Take as directed     No current facility-administered medications for this visit. Physical Examination: General appearance - alert, well appearing, and in no distress  Chest - clear to auscultation, no wheezes, rales or rhonchi, symmetric air entry  Heart - normal rate, regular rhythm, normal S1, S2, no murmurs, rubs, clicks or gallops      Assessment/ Plan:   Diagnoses and all orders for this visit:    1. Low serum vitamin B12  -     VITAMIN B12; Future    2. Benign prostatic hyperplasia with nocturia  -     tamsulosin (Flomax) 0.4 mg capsule; Take 2 Caps by mouth nightly. 3. Low vitamin D level  -     VITAMIN D, 25 HYDROXY; Future    4. Primary insomnia  -     traZODone (DESYREL) 50 mg tablet; Take 1 Tab by mouth nightly as needed for Sleep. May use up to 2 tabs if needed       Follow-up and Dispositions    · Return if symptoms worsen or fail to improve. I have discussed the diagnosis with the patient and the intended plan as seen in the above orders. The patient has received an after-visit summary and questions were answered concerning future plans. Pt conveyed understanding of plan.     Medication Side Effects and Warnings were discussed with patient      Dede Orlando, DO

## 2020-12-22 ENCOUNTER — OFFICE VISIT (OUTPATIENT)
Dept: CARDIOLOGY CLINIC | Age: 80
End: 2020-12-22
Payer: MEDICARE

## 2020-12-22 DIAGNOSIS — Z95.0 CARDIAC PACEMAKER IN SITU: Primary | ICD-10-CM

## 2020-12-22 PROCEDURE — 93294 REM INTERROG EVL PM/LDLS PM: CPT | Performed by: INTERNAL MEDICINE

## 2020-12-22 NOTE — LETTER
12/23/2020 3:30 PM 
 
Mr. Omar Martinez [address] [address] Dear Patient, We have received your recent remote monitor check of your implanted device scheduled on 12/22/2020 . Your remaining estimated battery life is 5.5 years and your pacer is working appropriately. Your next remote monitor check is scheduled for 3/23/2021. If you have any questions, please call the Pacemaker/ICD clinic at the [address] [place] [address] location at [phone]. Sincerely, 
 
 
Mike DIAZN, RN Cardiac Device Clinic Coordinator [facility] [address] 
[phone]

## 2020-12-23 NOTE — PROGRESS NOTES
c/BS VVI pacer remote    Normal & appropriate VVI pacer function. 99% RV pacing. Afib - has Watchman device & takes 81mg ASA. See scanned document for details.

## 2021-03-02 NOTE — TELEPHONE ENCOUNTER
Called patient. Patient stated he was unable to continue Plavix and Protonix because her felt bad all the time and was also having problems with his eye sight. Since stopping both meds, patient reports feeling better. Both medications started post Watchman implant. Recommended patient come in to discuss. Preferred phone follow up at this time. Please advise. no

## 2021-03-23 ENCOUNTER — OFFICE VISIT (OUTPATIENT)
Dept: CARDIOLOGY CLINIC | Age: 81
End: 2021-03-23
Payer: MEDICARE

## 2021-03-23 DIAGNOSIS — Z95.0 CARDIAC PACEMAKER IN SITU: Primary | ICD-10-CM

## 2021-03-23 PROCEDURE — 93294 REM INTERROG EVL PM/LDLS PM: CPT | Performed by: INTERNAL MEDICINE

## 2021-03-23 NOTE — LETTER
3/23/2021 1:26 PM 
 
Mr. Jen Ulloa [address] [address] Dear Patient, We have received your recent remote monitor check of your implanted device scheduled on 3/23/2021. Your remaining estimated battery life is 5 years  and your device is working normally & appropriately. You have had no unusual heart rates recorded. Your next remote monitor check is scheduled for 6/29/2021. If you have any questions, please call the Pacemaker/ICD clinic at the [facility] location at [phone]. Sincerely, 
 
Whit CLARK, RN Cardiac Device Clinic Coordinator [facility] [address] [place], [address] [place] [address] 
[phone]

## 2021-05-12 ENCOUNTER — OFFICE VISIT (OUTPATIENT)
Dept: FAMILY MEDICINE CLINIC | Age: 81
End: 2021-05-12
Payer: MEDICARE

## 2021-05-12 VITALS
SYSTOLIC BLOOD PRESSURE: 182 MMHG | TEMPERATURE: 97.5 F | RESPIRATION RATE: 18 BRPM | WEIGHT: 187 LBS | HEIGHT: 74 IN | OXYGEN SATURATION: 97 % | HEART RATE: 60 BPM | BODY MASS INDEX: 24 KG/M2 | DIASTOLIC BLOOD PRESSURE: 85 MMHG

## 2021-05-12 DIAGNOSIS — I48.91 ATRIAL FIBRILLATION, UNSPECIFIED TYPE (HCC): ICD-10-CM

## 2021-05-12 DIAGNOSIS — R73.03 PREDIABETES: ICD-10-CM

## 2021-05-12 DIAGNOSIS — R41.3 MEMORY LOSS: Primary | ICD-10-CM

## 2021-05-12 DIAGNOSIS — R35.0 FREQUENT URINATION: ICD-10-CM

## 2021-05-12 DIAGNOSIS — E53.8 B12 DEFICIENCY: ICD-10-CM

## 2021-05-12 DIAGNOSIS — Z13.31 SCREENING FOR DEPRESSION: ICD-10-CM

## 2021-05-12 LAB
BILIRUB UR QL STRIP: NEGATIVE
GLUCOSE UR-MCNC: NEGATIVE MG/DL
KETONES P FAST UR STRIP-MCNC: NEGATIVE MG/DL
PH UR STRIP: 5.5 [PH] (ref 4.6–8)
PROT UR QL STRIP: NORMAL
SP GR UR STRIP: 1.02 (ref 1–1.03)
UA UROBILINOGEN AMB POC: NORMAL (ref 0.2–1)
URINALYSIS CLARITY POC: CLEAR
URINALYSIS COLOR POC: YELLOW
URINE BLOOD POC: NEGATIVE
URINE LEUKOCYTES POC: NORMAL
URINE NITRITES POC: NEGATIVE

## 2021-05-12 PROCEDURE — G0439 PPPS, SUBSEQ VISIT: HCPCS | Performed by: FAMILY MEDICINE

## 2021-05-12 PROCEDURE — G8753 SYS BP > OR = 140: HCPCS | Performed by: FAMILY MEDICINE

## 2021-05-12 PROCEDURE — G8427 DOCREV CUR MEDS BY ELIG CLIN: HCPCS | Performed by: FAMILY MEDICINE

## 2021-05-12 PROCEDURE — G8754 DIAS BP LESS 90: HCPCS | Performed by: FAMILY MEDICINE

## 2021-05-12 PROCEDURE — G8510 SCR DEP NEG, NO PLAN REQD: HCPCS | Performed by: FAMILY MEDICINE

## 2021-05-12 PROCEDURE — G8536 NO DOC ELDER MAL SCRN: HCPCS | Performed by: FAMILY MEDICINE

## 2021-05-12 PROCEDURE — 81003 URINALYSIS AUTO W/O SCOPE: CPT | Performed by: FAMILY MEDICINE

## 2021-05-12 PROCEDURE — 99214 OFFICE O/P EST MOD 30 MIN: CPT | Performed by: FAMILY MEDICINE

## 2021-05-12 PROCEDURE — 1101F PT FALLS ASSESS-DOCD LE1/YR: CPT | Performed by: FAMILY MEDICINE

## 2021-05-12 PROCEDURE — G8420 CALC BMI NORM PARAMETERS: HCPCS | Performed by: FAMILY MEDICINE

## 2021-05-12 NOTE — PATIENT INSTRUCTIONS
Medicare Wellness Visit, Male    The best way to live healthy is to have a lifestyle where you eat a well-balanced diet, exercise regularly, limit alcohol use, and quit all forms of tobacco/nicotine, if applicable. Regular preventive services are another way to keep healthy. Preventive services (vaccines, screening tests, monitoring & exams) can help personalize your care plan, which helps you manage your own care. Screening tests can find health problems at the earliest stages, when they are easiest to treat. [facility] follows the current, evidence-based guidelines published by the [facility] Anibal Marcio ([facility]) when recommending preventive services for our patients. Because we follow these guidelines, sometimes recommendations change over time as research supports it. (For example, a prostate screening blood test is no longer routinely recommended for men with no symptoms). Of course, you and your doctor may decide to screen more often for some diseases, based on your risk and co-morbidities (chronic disease you are already diagnosed with). Preventive services for you include:  - Medicare offers their members a free annual wellness visit, which is time for you and your primary care provider to discuss and plan for your preventive service needs. Take advantage of this benefit every year!  -All adults over age 72 should receive the recommended pneumonia vaccines. Current USPSTF guidelines recommend a series of two vaccines for the best pneumonia protection.   -All adults should have a flu vaccine yearly and tetanus vaccine every 10 years.  -All adults age 48 and older should receive the shingles vaccines (series of two vaccines).        -All adults age 38-68 who are overweight should have a diabetes screening test once every three years.   -Other screening tests & preventive services for persons with diabetes include: an eye exam to screen for diabetic retinopathy, a kidney function test, a foot exam, and stricter control over your cholesterol.   -Cardiovascular screening for adults with routine risk involves an electrocardiogram (ECG) at intervals determined by the provider.   -Colorectal cancer screening should be done for adults age 54-65 with no increased risk factors for colorectal cancer. There are a number of acceptable methods of screening for this type of cancer. Each test has its own benefits and drawbacks. Discuss with your provider what is most appropriate for you during your annual wellness visit. The different tests include: colonoscopy (considered the best screening method), a fecal occult blood test, a fecal DNA test, and sigmoidoscopy.  -All adults born between [facility] should be screened once for Hepatitis C.  -An Abdominal Aortic Aneurysm (AAA) Screening is recommended for men age 73-68 who has ever smoked in their lifetime.      Here is a list of your current Health Maintenance items (your personalized list of preventive services) with a due date:  Health Maintenance Due   Topic Date Due    COVID-19 Vaccine (1) Never done    Shingles Vaccine (1 of 2) Never done

## 2021-05-12 NOTE — PROGRESS NOTES
Progress Note    he is a 79y.o. year old male who presents for evalution. Subjective:     Pt states he lost his car in [facility] parking lot recently and 2 women helped him find out then he was fine. This is making him concerned about his memory. Was feeling anxious this day. Not getting lost, not forgetting who people are. Wife is worried about his memory. B12 was borderline low last Fall. He is still taking a PO supplement. Reporting issues with freq urination. No pain no burning. No f/c. Denies hx of prostate sx or procedure. Been going on for 6 months now. Reviewed PmHx, RxHx, FmHx, SocHx, AllgHx and updated and dated in the chart. Review of Systems - negative except as listed above in the HPI    Objective:     Vitals:    05/12/21 1328   BP: (!) 182/85   Pulse: 60   Resp: 18   Temp: 97.5 °F (36.4 °C)   TempSrc: Oral   SpO2: 97%   Weight: 187 lb (84.8 kg)   Height: 6' 2\" (1.88 m)       Current Outpatient Medications   Medication Sig    tamsulosin (Flomax) 0.4 mg capsule Take 2 Caps by mouth nightly.  traZODone (DESYREL) 50 mg tablet Take 1 Tab by mouth nightly as needed for Sleep. May use up to 2 tabs if needed    nebivoloL (Bystolic) 20 mg tablet TAKE ONE TABLET BY MOUTH DAILY    multivitamin (ONE A DAY) tablet Take 1 Tab by mouth daily.  aspirin delayed-release 81 mg tablet Take 1 Tab by mouth daily. (Patient taking differently: Take 81 mg by mouth.)    fish oil-omega-3 fatty acids (FISH OIL) 340-1,000 mg capsule Take 1 Cap by mouth daily.  saw palmetto 160 mg cap Take 160 mg by mouth three (3) times daily.  methylPREDNISolone (MEDROL DOSEPACK) 4 mg tablet Take as directed     No current facility-administered medications for this visit.         Physical Examination: General appearance - alert, well appearing, and in no distress  Mental status - alert, oriented to person, place, and time  Chest - clear to auscultation, no wheezes, rales or rhonchi, symmetric air entry  Heart - normal rate, regular rhythm, normal S1, S2, no murmurs, rubs, clicks or gallops      Assessment/ Plan:   Diagnoses and all orders for this visit:    1. Memory loss  -     TSH 3RD GENERATION; Future  -     VITAMIN B12; Future  -     METABOLIC PANEL, COMPREHENSIVE; Future    2. Frequent urination  -     AMB POC URINALYSIS DIP STICK AUTO W/O MICRO    3. Prediabetes  -     HEMOGLOBIN A1C WITH EAG; Future  -     METABOLIC PANEL, COMPREHENSIVE; Future    4. B12 deficiency  -     VITAMIN B12; Future    5. Atrial fibrillation, unspecified type ([facility] [address]    6. Screening for depression  -     [facility]             I have discussed the diagnosis with the patient and the intended plan as seen in the above orders. The patient has received an after-visit summary and questions were answered concerning future plans. Pt conveyed understanding of plan. Medication Side Effects and Warnings were discussed with patient    An electronic signature was used to authenticate this note  Varun Robbins, DO  .1. What year is it? 1/1  2. What season? 1/1  3. What's the date? 0/1  4. What day is it? 1/1  5. What month? 1/1  6. Where are you now? 1/1  7. What state? 1/1  8. What county? 1/1  9. What town? 1/1  10. What room are we in?  1/1  11. Say:  I am going to name 3 objects. When I am finished, I want you to repeat them. Remember what they are because I am going to ask you to name them again in a few minutes. Say the following slowly at 1 second intervals \"Ball  /Car / Man\" 3  12. Spell the word \"WORLD\" backwards (D-L-R-O-W). 5  13. Now what were those 3 objects I asked you to remember? 1  14. Show pt two common objects (pen, watch) and ask them what they are 2  15. Say:  I would like you to repeat this phrase after me, \"No ifs, ands, or buts. \"  0/1  16. Say:  Read the words on this page and do what it says. Then hand the pt a piece of paper that says CLOSE YOUR EYES on it.   Score only if subject closes their eyes. 1/1  17. HAND the pt a paper and pencil and ask them to write a COMPLETE sentence. Spelling doesn't matter as long as the sentence makes sense. 1/1  18. Draw two pentagons intersecting then hand the pt a pencil w/ eraser and ask themto copy the picture. May allow multiple tries and wait until finished(1 minute max) and hands it back. Score only for correctly copied diagram.  1/1  19. Hand pt piece of paper and ask to TAKE THIS PAPER IN YOU LEFT HAND FOLD IT IN HALF ONCE WITH BOTH HANDS THEN PUT THE PAPER ON THE FLOOR.  (each step is worth 1 point for a total of 3). 3      Score Total 26/30 normal finding    This is the Subsequent Medicare Annual Wellness Exam, performed 12 months or more after the Initial AWV or the last Subsequent AWV    I have reviewed the patient's medical history in detail and updated the computerized patient record. Assessment/Plan   Education and counseling provided:  Are appropriate based on today's review and evaluation    1. Memory loss  -     TSH 3RD GENERATION; Future  -     VITAMIN B12; Future  -     METABOLIC PANEL, COMPREHENSIVE; Future  2. Frequent urination  -     AMB POC URINALYSIS DIP STICK AUTO W/O MICRO  3. Prediabetes  -     HEMOGLOBIN A1C WITH EAG; Future  -     METABOLIC PANEL, COMPREHENSIVE; Future  4. B12 deficiency  -     VITAMIN B12; Future  5. Atrial fibrillation, unspecified type ([facility] [address]  6.  Screening for depression  -     DEPRESSION SCREEN ANNUAL       Depression Risk Factor Screening     3 most recent PHQ Screens 5/12/2021   Little interest or pleasure in doing things Not at all   Feeling down, depressed, irritable, or hopeless Not at all   Total Score PHQ 2 0       Alcohol Risk Screen    Do you average more than 1 drink per night or more than 7 drinks a week: No    In the past three months have you have had more than 4 drinks containing alcohol on one occasion: No        Functional Ability and Level of Safety    Hearing: Hearing is good. Activities of Daily Living: The home contains: no safety equipment. Patient does total self care      Ambulation: with no difficulty     Fall Risk:  Fall Risk Assessment, last 12 mths 10/28/2020   Able to walk? Yes   Fall in past 12 months? No   Number of falls in past 12 months -      Abuse Screen:  Patient is not abused       Cognitive Screening    Has your family/caregiver stated any concerns about your memory: yes - wife     Cognitive Screening: Normal - MMSE (Mini Mental Status Exam)    Health Maintenance Due     Health Maintenance Due   Topic Date Due    COVID-19 Vaccine (1) Never done    Shingrix Vaccine Age 50> (1 of 2) Never done       Patient Care Team   Patient Care Team:  Roxnae Arora DO as PCP - General (Family Medicine)  Roxane Arora DO as PCP - [address] [place] [address] [facility]  Eduardo Mejia MD (Cardiology)  Viktoriya Wilson MD as Surgeon (Cardiothoracic Surgery)  Kian Gomez MD (Cardiology)    History     Patient Active Problem List   Diagnosis Code    Essential hypertension I10    Chronic atrial fibrillation ([address] I48.20    Pacemaker Z95.0    ACP (advance care planning) Z71.89    Anticoagulant drug declined Z53.20    Atrial fibrillation ([address] I48.91    Prediabetes R73.03     Past Medical History:   Diagnosis Date    A-fib [facility])     discovered 2008    Arthritis     Knees    Hypertension     Pacemaker     placed  Nov 2011 ([employer])    Sick sinus syndrome ([address]     heart monitor showed long pauses (> 4 sec) in 2011      Past Surgical History:   Procedure Laterality Date    HX HERNIA REPAIR      HX PACEMAKER       Current Outpatient Medications   Medication Sig Dispense Refill    tamsulosin (Flomax) 0.4 mg capsule Take 2 Caps by mouth nightly. 180 Cap 3    traZODone (DESYREL) 50 mg tablet Take 1 Tab by mouth nightly as needed for Sleep.  May use up to 2 tabs if needed 30 Tab 2    nebivoloL (Bystolic) 20 mg tablet TAKE ONE TABLET BY MOUTH DAILY 30 Tab 5  multivitamin (ONE A DAY) tablet Take 1 Tab by mouth daily.  aspirin delayed-release 81 mg tablet Take 1 Tab by mouth daily. (Patient taking differently: Take 81 mg by mouth.) 60 Tab 0    fish oil-omega-3 fatty acids (FISH OIL) 340-1,000 mg capsule Take 1 Cap by mouth daily.  saw palmetto 160 mg cap Take 160 mg by mouth three (3) times daily.       methylPREDNISolone (MEDROL DOSEPACK) 4 mg tablet Take as directed 1 Dose Pack 0     No Known Allergies    Family History   Problem Relation Age of Onset    Diabetes Maternal Aunt     Hypertension Mother     Dementia Father      Social History     Tobacco Use    Smoking status: Never Smoker    Smokeless tobacco: Never Used   Substance Use Topics    Alcohol use: No     Alcohol/week: 0.0 standard drinks         Maile Gaxiola, DO

## 2021-05-12 NOTE — PROGRESS NOTES
Chief Complaint   Patient presents with    Memory Loss     Patient presents in office today with c/o memory loss. David Asif Has has c/o a knot on his left side of his groin. States that it went away and then came back. Also has c/o ongoing frequent urination. No other concerns. 1. Have you been to the ER, urgent care clinic since your last visit? Hospitalized since your last visit? No    2. Have you seen or consulted any other health care providers outside of the [address] [place] [address] since your last visit? Include any pap smears or colon screening.  No    Learning Assessment 6/29/2018   PRIMARY LEARNER Patient   HIGHEST LEVEL OF EDUCATION - PRIMARY LEARNER  GRADUATED HIGH SCHOOL OR GED   BARRIERS PRIMARY LEARNER NONE   CO-LEARNER CAREGIVER No   PRIMARY LANGUAGE ENGLISH   LEARNER PREFERENCE PRIMARY DEMONSTRATION     -   ANSWERED BY Patient   RELATIONSHIP SELF

## 2021-05-13 LAB
ALBUMIN SERPL-MCNC: 3.8 G/DL (ref 3.5–5)
ALBUMIN/GLOB SERPL: 1.2 {RATIO} (ref 1.1–2.2)
ALP SERPL-CCNC: 118 U/L (ref 45–117)
ALT SERPL-CCNC: 24 U/L (ref 12–78)
ANION GAP SERPL CALC-SCNC: 6 MMOL/L (ref 5–15)
AST SERPL-CCNC: 20 U/L (ref 15–37)
BILIRUB SERPL-MCNC: 1.1 MG/DL (ref 0.2–1)
BUN SERPL-MCNC: 21 MG/DL (ref 6–20)
BUN/CREAT SERPL: 23 (ref 12–20)
CALCIUM SERPL-MCNC: 9 MG/DL (ref 8.5–10.1)
CHLORIDE SERPL-SCNC: 107 MMOL/L (ref 97–108)
CO2 SERPL-SCNC: 26 MMOL/L (ref 21–32)
CREAT SERPL-MCNC: 0.92 MG/DL (ref 0.7–1.3)
EST. AVERAGE GLUCOSE BLD GHB EST-MCNC: 131 MG/DL
GLOBULIN SER CALC-MCNC: 3.3 G/DL (ref 2–4)
GLUCOSE SERPL-MCNC: 118 MG/DL (ref 65–100)
HBA1C MFR BLD: 6.2 % (ref 4–5.6)
POTASSIUM SERPL-SCNC: 4.4 MMOL/L (ref 3.5–5.1)
PROT SERPL-MCNC: 7.1 G/DL (ref 6.4–8.2)
PSA SERPL-MCNC: 29.2 NG/ML (ref 0.01–4)
SODIUM SERPL-SCNC: 139 MMOL/L (ref 136–145)
TSH SERPL DL<=0.05 MIU/L-ACNC: 1.86 UIU/ML (ref 0.36–3.74)
VIT B12 SERPL-MCNC: 290 PG/ML (ref 193–986)

## 2021-05-13 NOTE — PROGRESS NOTES
Prostate cancer marker is very high he needs to see urology. Please refer to [facility] can we fax a copy of these labs to them as well once he has an appointment? Labs are otherwise stable.

## 2021-05-13 NOTE — PROGRESS NOTES
Called and spoke with patient in reference to labs. Gave him the number to [facility]. Patient verbalized understanding. Labs faxed to [facility]. Fax number [phone] confirmation number [phone].

## 2021-05-14 RX ORDER — NITROFURANTOIN 25; 75 MG/1; MG/1
100 CAPSULE ORAL 2 TIMES DAILY
Qty: 10 CAP | Refills: 0 | Status: SHIPPED | OUTPATIENT
Start: 2021-05-14 | End: 2021-05-19

## 2021-05-14 NOTE — PROGRESS NOTES
Is it for UTI. Macrobid sent in. Sensitivities are not back yet. If you need to change it we can call him next week and let him know otherwise take the [facility] for now.

## 2021-05-15 LAB
BACTERIA SPEC CULT: ABNORMAL
CC UR VC: ABNORMAL
SERVICE CMNT-IMP: ABNORMAL

## 2021-06-14 DIAGNOSIS — I48.20 CHRONIC ATRIAL FIBRILLATION (HCC): ICD-10-CM

## 2021-06-14 DIAGNOSIS — I10 ESSENTIAL HYPERTENSION WITH GOAL BLOOD PRESSURE LESS THAN 140/90: ICD-10-CM

## 2021-06-15 RX ORDER — NEBIVOLOL 20 MG/1
TABLET ORAL
Qty: 30 TABLET | Refills: 4 | Status: SHIPPED | OUTPATIENT
Start: 2021-06-15 | End: 2022-05-13

## 2021-07-09 ENCOUNTER — TELEPHONE (OUTPATIENT)
Dept: FAMILY MEDICINE CLINIC | Age: 81
End: 2021-07-09

## 2021-07-09 RX ORDER — AZITHROMYCIN 250 MG/1
TABLET, FILM COATED ORAL
Qty: 6 TABLET | Refills: 0 | Status: SHIPPED
Start: 2021-07-09 | End: 2021-08-17

## 2021-07-09 NOTE — TELEPHONE ENCOUNTER
Pt's wife called in wanting Rx called into pharmacy. States pt has headache, body aches, productive cough, yellow in color and fever and fatigued, pt has had symptoms x1week. Pt has been around family members that have the same symptoms.     [phone]

## 2021-07-09 NOTE — TELEPHONE ENCOUNTER
Called and spoke with wife. Advised of RX sent to pharmacy and to follow up in office if no improvement. Wife verbalized understanding.

## 2021-08-17 ENCOUNTER — OFFICE VISIT (OUTPATIENT)
Dept: FAMILY MEDICINE CLINIC | Age: 81
End: 2021-08-17
Payer: MEDICARE

## 2021-08-17 VITALS
DIASTOLIC BLOOD PRESSURE: 83 MMHG | HEIGHT: 74 IN | HEART RATE: 59 BPM | BODY MASS INDEX: 22.2 KG/M2 | RESPIRATION RATE: 18 BRPM | WEIGHT: 173 LBS | SYSTOLIC BLOOD PRESSURE: 150 MMHG | TEMPERATURE: 97.5 F | OXYGEN SATURATION: 96 %

## 2021-08-17 DIAGNOSIS — R35.0 URINARY FREQUENCY: ICD-10-CM

## 2021-08-17 DIAGNOSIS — K40.90 NON-RECURRENT UNILATERAL INGUINAL HERNIA WITHOUT OBSTRUCTION OR GANGRENE: ICD-10-CM

## 2021-08-17 DIAGNOSIS — E53.8 B12 DEFICIENCY: ICD-10-CM

## 2021-08-17 DIAGNOSIS — R41.3 MEMORY LOSS: Primary | ICD-10-CM

## 2021-08-17 LAB
BILIRUB UR QL STRIP: NEGATIVE
GLUCOSE UR-MCNC: NEGATIVE MG/DL
KETONES P FAST UR STRIP-MCNC: NEGATIVE MG/DL
PH UR STRIP: 5.5 [PH] (ref 4.6–8)
PROT UR QL STRIP: NORMAL
SP GR UR STRIP: 1.02 (ref 1–1.03)
UA UROBILINOGEN AMB POC: NORMAL (ref 0.2–1)
URINALYSIS CLARITY POC: CLEAR
URINALYSIS COLOR POC: YELLOW
URINE BLOOD POC: NORMAL
URINE LEUKOCYTES POC: NEGATIVE
URINE NITRITES POC: NEGATIVE

## 2021-08-17 PROCEDURE — G8420 CALC BMI NORM PARAMETERS: HCPCS | Performed by: FAMILY MEDICINE

## 2021-08-17 PROCEDURE — G8754 DIAS BP LESS 90: HCPCS | Performed by: FAMILY MEDICINE

## 2021-08-17 PROCEDURE — 81001 URINALYSIS AUTO W/SCOPE: CPT | Performed by: FAMILY MEDICINE

## 2021-08-17 PROCEDURE — G8753 SYS BP > OR = 140: HCPCS | Performed by: FAMILY MEDICINE

## 2021-08-17 PROCEDURE — G8427 DOCREV CUR MEDS BY ELIG CLIN: HCPCS | Performed by: FAMILY MEDICINE

## 2021-08-17 PROCEDURE — G8536 NO DOC ELDER MAL SCRN: HCPCS | Performed by: FAMILY MEDICINE

## 2021-08-17 PROCEDURE — G8510 SCR DEP NEG, NO PLAN REQD: HCPCS | Performed by: FAMILY MEDICINE

## 2021-08-17 PROCEDURE — 1101F PT FALLS ASSESS-DOCD LE1/YR: CPT | Performed by: FAMILY MEDICINE

## 2021-08-17 PROCEDURE — 99214 OFFICE O/P EST MOD 30 MIN: CPT | Performed by: FAMILY MEDICINE

## 2021-08-17 NOTE — PATIENT INSTRUCTIONS
A Healthy Lifestyle: Care Instructions  Your Care Instructions     A healthy lifestyle can help you feel good, stay at a healthy weight, and have plenty of energy for both work and play. A healthy lifestyle is something you can share with your whole family. A healthy lifestyle also can lower your risk for serious health problems, such as high blood pressure, heart disease, and diabetes. You can follow a few steps listed below to improve your health and the health of your family. Follow-up care is a key part of your treatment and safety. Be sure to make and go to all appointments, and call your doctor if you are having problems. It's also a good idea to know your test results and keep a list of the medicines you take. How can you care for yourself at home? · Do not eat too much sugar, fat, or fast foods. You can still have dessert and treats now and then. The goal is moderation. · Start small to improve your eating habits. Pay attention to portion sizes, drink less juice and soda pop, and eat more fruits and vegetables. ? Eat a healthy amount of food. A 3-ounce serving of meat, for example, is about the size of a deck of cards. Fill the rest of your plate with vegetables and whole grains. ? Limit the amount of soda and sports drinks you have every day. Drink more water when you are thirsty. ? Eat plenty of fruits and vegetables every day. Have an apple or some carrot sticks as an afternoon snack instead of a candy bar. Try to have fruits and/or vegetables at every meal.  · Make exercise part of your daily routine. You may want to start with simple activities, such as walking, bicycling, or slow swimming. Try to be active 30 to 60 minutes every day. You do not need to do all 30 to 60 minutes all at once. For example, you can exercise 3 times a day for 10 or 20 minutes.  Moderate exercise is safe for most people, but it is always a good idea to talk to your doctor before starting an exercise program.  · Keep moving. Betzaida Marts the lawn, work in the garden, or [employer]. Take the stairs instead of the elevator at work. · If you smoke, quit. People who smoke have an increased risk for heart attack, stroke, cancer, and other lung illnesses. Quitting is hard, but there are ways to boost your chance of quitting tobacco for good. ? Use nicotine gum, patches, or lozenges. ? Ask your doctor about stop-smoking programs and medicines. ? Keep trying. In addition to reducing your risk of diseases in the future, you will notice some benefits soon after you stop using tobacco. If you have shortness of breath or asthma symptoms, they will likely get better within a few weeks after you quit. · Limit how much alcohol you drink. Moderate amounts of alcohol (up to 2 drinks a day for men, 1 drink a day for women) are okay. But drinking too much can lead to liver problems, high blood pressure, and other health problems. Family health  If you have a family, there are many things you can do together to improve your health. · Eat meals together as a family as often as possible. · Eat healthy foods. This includes fruits, vegetables, lean meats and dairy, and whole grains. · Include your family in your fitness plan. Most people think of activities such as jogging or tennis as the way to fitness, but there are many ways you and your family can be more active. Anything that makes you breathe hard and gets your heart pumping is exercise. Here are some tips:  ? Walk to do errands or to take your child to school or the bus.  ? Go for a family bike ride after dinner instead of watching TV. Where can you learn more? Go to http://www.gray.com/  Enter X155 in the search box to learn more about \"A Healthy Lifestyle: Care Instructions. \"  Current as of: September 23, 2020               Content Version: 12.8  © 6938-5781 Healthwise, Incorporated.    Care instructions adapted under license by [employer] (which disclaims liability or warranty for this information). If you have questions about a medical condition or this instruction, always ask your healthcare professional. [address] any warranty or liability for your use of this information.

## 2021-08-17 NOTE — PROGRESS NOTES
Chief Complaint   Patient presents with    Swelling     Patient presents in office today with c/o swelling after hernia surgery. Has an apt with urology in September. They told him that he needed to see his PCP before he sees urology. No other concerns. 1. Have you been to the ER, urgent care clinic since your last visit? Hospitalized since your last visit? No    2. Have you seen or consulted any other health care providers outside of the [address] [place] [address] since your last visit? Include any pap smears or colon screening.  No    Learning Assessment 6/29/2018   PRIMARY LEARNER Patient   HIGHEST LEVEL OF EDUCATION - PRIMARY LEARNER  GRADUATED HIGH SCHOOL OR GED   BARRIERS PRIMARY LEARNER NONE   CO-LEARNER CAREGIVER No   PRIMARY LANGUAGE ENGLISH   LEARNER PREFERENCE PRIMARY DEMONSTRATION     -   ANSWERED BY Patient   RELATIONSHIP SELF

## 2021-08-17 NOTE — PROGRESS NOTES
Progress Note    he is a 79y.o. year old male who presents for evalution. Subjective:     Pt had hernia surgery at [facility] about 6 months ago on the L  Side and states he is now having swelling on L side. No pain at all. Wife reports urinary frequency, dribbling. Pt feels like he does not empty completely. He did f/u with urology Dr Melissa Randhawa and has another f/u in Sep.      Pt also with memory issues and Pt refuses to see neurology. Has not been driving, wife is doing the driving. Discussed he should not be driving at this point. B12 was lower end of normal at 290 will recheck today. Reviewed PmHx, RxHx, FmHx, SocHx, AllgHx and updated and dated in the chart. Review of Systems - negative except as listed above in the HPI    Objective:     Vitals:    08/17/21 1502   BP: (!) 150/83   Pulse: (!) 59   Resp: 18   Temp: 97.5 °F (36.4 °C)   TempSrc: Oral   SpO2: 96%   Weight: 173 lb (78.5 kg)   Height: 6' 2\" (1.88 m)       Current Outpatient Medications   Medication Sig    nebivoloL (Bystolic) 20 mg tablet TAKE ONE TABLET BY MOUTH DAILY    tamsulosin (Flomax) 0.4 mg capsule Take 2 Caps by mouth nightly.  multivitamin (ONE A DAY) tablet Take 1 Tab by mouth daily.  aspirin delayed-release 81 mg tablet Take 1 Tab by mouth daily. (Patient taking differently: Take 81 mg by mouth.)    fish oil-omega-3 fatty acids (FISH OIL) 340-1,000 mg capsule Take 1 Cap by mouth daily.  saw palmetto 160 mg cap Take 160 mg by mouth three (3) times daily.  traZODone (DESYREL) 50 mg tablet Take 1 Tab by mouth nightly as needed for Sleep. May use up to 2 tabs if needed (Patient not taking: Reported on 8/17/2021)    methylPREDNISolone (MEDROL DOSEPACK) 4 mg tablet Take as directed (Patient not taking: Reported on 8/17/2021)     No current facility-administered medications for this visit.        Physical Examination: General appearance - alert, well appearing, and in no distress   Male - HERNIA EXAM: no hernias found on exam, repair on left side intact, TESTICULAR EXAM: normal, no masses      Assessment/ Plan:   Diagnoses and all orders for this visit:    1. Memory loss  -     REFERRAL TO NEUROLOGY  Pt did agree to see neurology  2. Non-recurrent unilateral inguinal hernia without obstruction or gangrene  Repair feels to be intact, no hernia palpated. Believe he may be feeling the mesh  3. Urinary frequency  -     AMB POC URINALYSIS DIP STICK AUTO W/ MICRO  -     CULTURE, URINE; Future  He will f/u with urology regarding his prostate in sep. Check culture, trace blood and small LE  4. B12 deficiency  -     VITAMIN B12; Future       Follow-up and Dispositions    · Return if symptoms worsen or fail to improve. I have discussed the diagnosis with the patient and the intended plan as seen in the above orders. The patient has received an after-visit summary and questions were answered concerning future plans. Pt conveyed understanding of plan.     Medication Side Effects and Warnings were discussed with patient    An electronic signature was used to authenticate this note  Luba Mccoy,

## 2021-08-18 LAB — VIT B12 SERPL-MCNC: 463 PG/ML (ref 193–986)

## 2021-08-19 RX ORDER — AMOXICILLIN AND CLAVULANATE POTASSIUM 875; 125 MG/1; MG/1
1 TABLET, FILM COATED ORAL EVERY 12 HOURS
Qty: 14 TABLET | Refills: 0 | Status: SHIPPED | OUTPATIENT
Start: 2021-08-19 | End: 2021-08-26

## 2021-08-19 NOTE — PROGRESS NOTES
Called and spoke with wife Allison Bui in reference to results. Allison Bui verbalized understanding.

## 2021-08-20 LAB
BACTERIA SPEC CULT: ABNORMAL
CC UR VC: ABNORMAL
SERVICE CMNT-IMP: ABNORMAL

## 2021-08-20 RX ORDER — NITROFURANTOIN 25; 75 MG/1; MG/1
100 CAPSULE ORAL 2 TIMES DAILY
Qty: 10 CAPSULE | Refills: 0 | Status: SHIPPED | OUTPATIENT
Start: 2021-08-20 | End: 2021-08-25

## 2021-08-20 NOTE — PROGRESS NOTES
Stop the Augmentin and start the Macrobid I just sent in.   This will cover his urinary tract infection

## 2021-08-20 NOTE — PROGRESS NOTES
Pt wife, Molly Henry, notified as below per Dr. Gerry Ellsworth. Verbalizes understanding & is agreeable to plan.

## 2021-09-28 ENCOUNTER — OFFICE VISIT (OUTPATIENT)
Dept: FAMILY MEDICINE CLINIC | Age: 81
End: 2021-09-28
Payer: MEDICARE

## 2021-09-28 VITALS
HEIGHT: 74 IN | HEART RATE: 61 BPM | SYSTOLIC BLOOD PRESSURE: 118 MMHG | WEIGHT: 181.4 LBS | BODY MASS INDEX: 23.28 KG/M2 | DIASTOLIC BLOOD PRESSURE: 65 MMHG | OXYGEN SATURATION: 100 % | TEMPERATURE: 98 F

## 2021-09-28 DIAGNOSIS — B37.2 INTERTRIGINOUS CANDIDIASIS: Primary | ICD-10-CM

## 2021-09-28 PROCEDURE — G8510 SCR DEP NEG, NO PLAN REQD: HCPCS | Performed by: STUDENT IN AN ORGANIZED HEALTH CARE EDUCATION/TRAINING PROGRAM

## 2021-09-28 PROCEDURE — G8427 DOCREV CUR MEDS BY ELIG CLIN: HCPCS | Performed by: STUDENT IN AN ORGANIZED HEALTH CARE EDUCATION/TRAINING PROGRAM

## 2021-09-28 PROCEDURE — G8752 SYS BP LESS 140: HCPCS | Performed by: STUDENT IN AN ORGANIZED HEALTH CARE EDUCATION/TRAINING PROGRAM

## 2021-09-28 PROCEDURE — 1101F PT FALLS ASSESS-DOCD LE1/YR: CPT | Performed by: STUDENT IN AN ORGANIZED HEALTH CARE EDUCATION/TRAINING PROGRAM

## 2021-09-28 PROCEDURE — 99213 OFFICE O/P EST LOW 20 MIN: CPT | Performed by: STUDENT IN AN ORGANIZED HEALTH CARE EDUCATION/TRAINING PROGRAM

## 2021-09-28 PROCEDURE — G8754 DIAS BP LESS 90: HCPCS | Performed by: STUDENT IN AN ORGANIZED HEALTH CARE EDUCATION/TRAINING PROGRAM

## 2021-09-28 PROCEDURE — G8536 NO DOC ELDER MAL SCRN: HCPCS | Performed by: STUDENT IN AN ORGANIZED HEALTH CARE EDUCATION/TRAINING PROGRAM

## 2021-09-28 PROCEDURE — G8420 CALC BMI NORM PARAMETERS: HCPCS | Performed by: STUDENT IN AN ORGANIZED HEALTH CARE EDUCATION/TRAINING PROGRAM

## 2021-09-28 RX ORDER — NYSTATIN 100000 [USP'U]/G
POWDER TOPICAL 4 TIMES DAILY
Qty: 60 G | Refills: 2 | Status: SHIPPED | OUTPATIENT
Start: 2021-09-28 | End: 2022-03-07

## 2021-09-28 NOTE — PROGRESS NOTES
Cyrus Dominguez is a 79 y.o. male , id x 2(name and ). Reviewed record, history, and  medications. Chief Complaint   Patient presents with    Skin Problem     adult breakout/itchy rectum. Reports that he started breaking out last night in his groin and anal area. Pt is not sure if he is allergic to something. Pt states that it is not just itchy but it is painful. states that he put a lotion around his genitals and rectum. pt states that he was not able to sleep last night. Vitals:    21 1518   BP: 118/65   Pulse: 61   Temp: 98 °F (36.7 °C)   SpO2: 100%   Weight: 181 lb 6.4 oz (82.3 kg)   Height: 6' 2\" (1.88 m)       Coordination of Care Questionnaire:   1) Have you been to an emergency room, urgent care, or hospitalized since your last visit?   no       2. Have seen or consulted any other health care provider since your last visit? NO      3 most recent PHQ Screens 2021   Little interest or pleasure in doing things Not at all   Feeling down, depressed, irritable, or hopeless Not at all   Total Score PHQ 2 0       Patient is accompanied by self and wife I have received verbal consent from Cyrus Dominguez to discuss any/all medical information while they are present in the room.

## 2021-09-28 NOTE — PATIENT INSTRUCTIONS
Change briefs and pads frequently to stay dry    Use medicine and let Dr. Val Burger know if things aren't improving by the end of the week. Do not use any other ointments or lotions in your groin. You may use the suppositories for hemorrhoids.

## 2021-09-28 NOTE — PROGRESS NOTES
Progress Note    he is a 79y.o. year old male who presents for evalution. Chief Complaint   Patient presents with    Skin Problem     adult breakout/itchy rectum. Reports that he started breaking out last night in his groin and anal area. Pt is not sure if he is allergic to something. Pt states that it is not just itchy but it is painful. states that he put a lotion around his genitals and rectum. pt states that he was not able to sleep last night. Assessment/ Plan:   Diagnoses and all orders for this visit:    1. Intertriginous candidiasis  -     nystatin (MYCOSTATIN) powder; Apply  to affected area four (4) times daily. Use until at least 24 hours after rash resolved. Discussed importance of keeping skin dry to prevent irritation and infection. Follow-up and Dispositions    · Return if symptoms worsen or fail to improve. I have discussed the diagnosis with the patient and the intended plan as seen in the above orders. The patient has received an after-visit summary and questions were answered concerning future plans. Pt conveyed understanding of plan. Medication Side Effects and Warnings were discussed with patient        Subjective:     Chief Complaint   Patient presents with    Skin Problem     adult breakout/itchy rectum. Reports that he started breaking out last night in his groin and anal area. Pt is not sure if he is allergic to something. Pt states that it is not just itchy but it is painful. states that he put a lotion around his genitals and rectum. pt states that he was not able to sleep last night. Issues with rash with itching and pain  No excessive moisture. Using neosporin and lubriderm since rash developed. Reports urinary frequency. No incontinence. Last BM this morning, normal.  Occasionally uses suppositories for hemorrhoids. Reviewed PmHx, RxHx, FmHx, SocHx, AllgHx and updated and dated in the chart.     Review of Systems - negative except as listed above in the HPI    Objective:     Vitals:    09/28/21 1518   BP: 118/65   Pulse: 61   Temp: 98 °F (36.7 °C)   SpO2: 100%   Weight: 181 lb 6.4 oz (82.3 kg)   Height: 6' 2\" (1.88 m)       Current Outpatient Medications   Medication Sig    nystatin (MYCOSTATIN) powder Apply  to affected area four (4) times daily. Use until at least 24 hours after rash resolved.  nebivoloL (Bystolic) 20 mg tablet TAKE ONE TABLET BY MOUTH DAILY    tamsulosin (Flomax) 0.4 mg capsule Take 2 Caps by mouth nightly.  multivitamin (ONE A DAY) tablet Take 1 Tab by mouth daily.  aspirin delayed-release 81 mg tablet Take 1 Tab by mouth daily. (Patient taking differently: Take 81 mg by mouth.)    fish oil-omega-3 fatty acids (FISH OIL) 340-1,000 mg capsule Take 1 Cap by mouth daily.  saw palmetto 160 mg cap Take 160 mg by mouth three (3) times daily.  traZODone (DESYREL) 50 mg tablet Take 1 Tab by mouth nightly as needed for Sleep. May use up to 2 tabs if needed (Patient not taking: Reported on 8/17/2021)     No current facility-administered medications for this visit. Physical Exam  Vitals and nursing note reviewed. Constitutional:       General: He is not in acute distress. Appearance: Normal appearance. He is not ill-appearing, toxic-appearing or diaphoretic. HENT:      Head: Normocephalic and atraumatic. Eyes:      General: No scleral icterus. Right eye: No discharge. Left eye: No discharge. Conjunctiva/sclera: Conjunctivae normal.   Pulmonary:      Effort: Pulmonary effort is normal. No respiratory distress. Musculoskeletal:      Cervical back: No rigidity. Skin:     General: Skin is warm and dry. Findings: Rash (Diffuse erythema with satellite lesions throughout groin and gluteal cleft) present. Neurological:      General: No focal deficit present. Mental Status: He is alert.               Valeriano Mitchell MD

## 2021-10-05 ENCOUNTER — DOCUMENTATION ONLY (OUTPATIENT)
Dept: FAMILY MEDICINE CLINIC | Age: 81
End: 2021-10-05

## 2021-10-06 ENCOUNTER — TELEPHONE (OUTPATIENT)
Dept: FAMILY MEDICINE CLINIC | Age: 81
End: 2021-10-06

## 2021-10-06 NOTE — TELEPHONE ENCOUNTER
Noted.  Instructions were given at OV for medication discontinuation and drying powder use after discontinuation.

## 2021-10-11 NOTE — PROGRESS NOTES
[facility] pre op form was faxed on 10/6/2021  to [phone] by Adán Mckeon LPN  and placed in scan.    Fax confirmation #: [id number]

## 2021-11-03 ENCOUNTER — TELEPHONE (OUTPATIENT)
Dept: CARDIOLOGY CLINIC | Age: 81
End: 2021-11-03

## 2021-11-03 NOTE — TELEPHONE ENCOUNTER
Returned patient's wife's call, Bill. Attempted to troubleshoot over the phone, unsuccessful. Pt states they already attempted to call [facility] but were also unsuccessful. Advised that new home monitor was ordered today from [facility] pacer clinic. Also scheduled pt with in-clinic pacer check and appt with Ami Lee NP, 12/6/2021 at 1040 since overdue. Questions answered, appreciative of call.

## 2021-11-03 NOTE — TELEPHONE ENCOUNTER
Patient's wife calling to speak to some in the device clinic as she stated a letter was received that the patient's device is not working and remote signal was not received since June, the company was called and the patient was directed to call the office, please advise            [phone]

## 2021-12-01 PROBLEM — C61 PROSTATE CARCINOMA (HCC): Status: ACTIVE | Noted: 2021-12-01

## 2021-12-06 ENCOUNTER — OFFICE VISIT (OUTPATIENT)
Dept: CARDIOLOGY CLINIC | Age: 81
End: 2021-12-06

## 2021-12-06 ENCOUNTER — OFFICE VISIT (OUTPATIENT)
Dept: CARDIOLOGY CLINIC | Age: 81
End: 2021-12-06
Payer: MEDICARE

## 2021-12-06 VITALS
OXYGEN SATURATION: 7 % | DIASTOLIC BLOOD PRESSURE: 74 MMHG | SYSTOLIC BLOOD PRESSURE: 138 MMHG | BODY MASS INDEX: 24.05 KG/M2 | HEIGHT: 74 IN | HEART RATE: 64 BPM | WEIGHT: 187.4 LBS

## 2021-12-06 DIAGNOSIS — I48.91 ATRIAL FIBRILLATION, UNSPECIFIED TYPE (HCC): ICD-10-CM

## 2021-12-06 DIAGNOSIS — Z95.0 CARDIAC PACEMAKER IN SITU: Primary | ICD-10-CM

## 2021-12-06 PROCEDURE — 99214 OFFICE O/P EST MOD 30 MIN: CPT | Performed by: NURSE PRACTITIONER

## 2021-12-06 PROCEDURE — G8420 CALC BMI NORM PARAMETERS: HCPCS | Performed by: NURSE PRACTITIONER

## 2021-12-06 PROCEDURE — G8432 DEP SCR NOT DOC, RNG: HCPCS | Performed by: NURSE PRACTITIONER

## 2021-12-06 PROCEDURE — 93279 PRGRMG DEV EVAL PM/LDLS PM: CPT | Performed by: INTERNAL MEDICINE

## 2021-12-06 PROCEDURE — G8754 DIAS BP LESS 90: HCPCS | Performed by: NURSE PRACTITIONER

## 2021-12-06 PROCEDURE — G8536 NO DOC ELDER MAL SCRN: HCPCS | Performed by: NURSE PRACTITIONER

## 2021-12-06 PROCEDURE — 1101F PT FALLS ASSESS-DOCD LE1/YR: CPT | Performed by: NURSE PRACTITIONER

## 2021-12-06 PROCEDURE — G8752 SYS BP LESS 140: HCPCS | Performed by: NURSE PRACTITIONER

## 2021-12-06 PROCEDURE — G8427 DOCREV CUR MEDS BY ELIG CLIN: HCPCS | Performed by: NURSE PRACTITIONER

## 2021-12-06 NOTE — PROGRESS NOTES
HISTORY OF PRESENTING ILLNESS      Christopher Crow is a 79 y.o. male with hypertension, long-standing persistent atrial fibrillation, single-chamber PPM s/p WATCHMAN. 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 Previously he had discussed referral for evaluation with vascular surgery however patient declined. He is here for device follow up and denies cardiac complaints. He works out at [employer] 3 x a week without issue. PAST MEDICAL HISTORY     Past Medical History:   Diagnosis Date    A-fib [facility])     discovered 2008    Arthritis     Knees    Hypertension     Pacemaker     placed  Nov 2011 ([address])    Sick sinus syndrome [facility])     heart monitor showed long pauses (> 4 sec) in 2011           PAST SURGICAL HISTORY     Past Surgical History:   Procedure Laterality Date    HX HERNIA REPAIR      HX PACEMAKER            ALLERGIES     No Known Allergies       FAMILY HISTORY     Family History   Problem Relation Age of Onset    Diabetes Maternal Aunt     Hypertension Mother     Dementia Father     negative for cardiac disease       SOCIAL HISTORY     Social History     Socioeconomic History    Marital status:    Tobacco Use    Smoking status: Never Smoker    Smokeless tobacco: Never Used   Substance and Sexual Activity    Alcohol use: No     Alcohol/week: 0.0 standard drinks    Drug use: No         MEDICATIONS     Current Outpatient Medications   Medication Sig    nystatin (MYCOSTATIN) powder Apply  to affected area four (4) times daily. Use until at least 24 hours after rash resolved.  nebivoloL (Bystolic) 20 mg tablet TAKE ONE TABLET BY MOUTH DAILY    multivitamin (ONE A DAY) tablet Take 1 Tab by mouth daily.  aspirin delayed-release 81 mg tablet Take 1 Tab by mouth daily. (Patient taking differently: Take 81 mg by mouth daily.  Indications: Takes once or twice a week)    fish oil-omega-3 fatty acids (FISH OIL) 340-1,000 mg capsule Take 1 Cap by mouth daily.  saw palmetto 160 mg cap Take 160 mg by mouth three (3) times daily.  tamsulosin (Flomax) 0.4 mg capsule Take 2 Caps by mouth nightly. (Patient taking differently: Take 0.4 mg by mouth nightly.)    traZODone (DESYREL) 50 mg tablet Take 1 Tab by mouth nightly as needed for Sleep. May use up to 2 tabs if needed (Patient not taking: Reported on 8/17/2021)     No current facility-administered medications for this visit. I have reviewed the nurses notes, vitals, problem list, allergy list, medical history, family, social history and medications. REVIEW OF SYMPTOMS      General: Pt denies excessive weight gain or loss. Pt is able to conduct ADL's  HEENT: Denies blurred vision, headaches, hearing loss, epistaxis and difficulty swallowing. Respiratory: Denies cough, congestion, shortness of breath, LOMELI, wheezing or stridor. Cardiovascular: Denies precordial pain, palpitations, edema or PND  Gastrointestinal: Denies poor appetite, indigestion, abdominal pain or blood in stool  Genitourinary: Denies hematuria, dysuria, increased urinary frequency  Musculoskeletal: Denies joint pain or swelling from muscles or joints  Neurologic: Denies tremor, paresthesias, headache, or sensory motor disturbance  Psychiatric: Denies confusion, insomnia, depression  Integumentray: Denies rash, itching or ulcers. Hematologic: Denies easy bruising, bleeding       PHYSICAL EXAMINATION      Vitals: see vitals section  General: Well developed, in no acute distress. HEENT: No jaundice, oral mucosa moist, no oral ulcers  Neck: Supple, no stiffness, no lymphadenopathy, supple  Heart:  Normal S1/S2 negative S3 or S4. Regular, no murmur, gallop or rub, no jugular venous distention  Respiratory: Clear bilaterally x 4, no wheezing or rales  Abdomen:   Soft, non-tender, bowel sounds are active.    Extremities:  No edema, normal cap refill, no cyanosis. Left arm vascular ectasia  Musculoskeletal: No clubbing, no deformities  Neuro: A&Ox3, speech clear, gait stable, cooperative, no focal neurologic deficits  Skin: Skin color is normal. No rashes or lesions. Non diaphoretic, moist.  Vascular: 2+ pulses symmetric in all extremities       DIAGNOSTIC DATA      EKG:        LABORATORY DATA      Lab Results   Component Value Date/Time    WBC 5.9 06/29/2020 10:21 AM    HGB 15.6 06/29/2020 10:21 AM    HCT 48.9 06/29/2020 10:21 AM    PLATELET 992 15/90/1220 10:21 AM    MCV 98.6 06/29/2020 10:21 AM      Lab Results   Component Value Date/Time    Sodium 139 05/12/2021 02:33 PM    Potassium 4.4 05/12/2021 02:33 PM    Chloride 107 05/12/2021 02:33 PM    CO2 26 05/12/2021 02:33 PM    Anion gap 6 05/12/2021 02:33 PM    Glucose 118 (H) 05/12/2021 02:33 PM    BUN 21 (H) 05/12/2021 02:33 PM    Creatinine 0.92 05/12/2021 02:33 PM    BUN/Creatinine ratio 23 (H) 05/12/2021 02:33 PM    GFR est AA >60 05/12/2021 02:33 PM    GFR est non-AA >60 05/12/2021 02:33 PM    Calcium 9.0 05/12/2021 02:33 PM    Bilirubin, total 1.1 (H) 05/12/2021 02:33 PM    Alk. phosphatase 118 (H) 05/12/2021 02:33 PM    Protein, total 7.1 05/12/2021 02:33 PM    Albumin 3.8 05/12/2021 02:33 PM    Globulin 3.3 05/12/2021 02:33 PM    A-G Ratio 1.2 05/12/2021 02:33 PM    ALT (SGPT) 24 05/12/2021 02:33 PM           ASSESSMENT      1. Atrial fibrillation              A. Long-standing persistent              B. Poorly tolerant of antiplatelet therapy              C. WATCHMAN  2. Pacemaker                A. Single chamber              B. [address]  3. Lead malfunction              A. Insulation breach  4. Vascular ectasia       PLAN     He is doing well and device interrogation shows normal functioning. Continue follow up in device clinic remotely every 3 months and once per year in office.      FOLLOW-UP     1 year      Thank you, Nicky Mcneil DO for allowing me to participate in the care of this extraordinarily pleasant male. Please do not hesitate to contact me for further questions/concerns.      ALEIDA Campbell [facility].  [address] [facility], [facility], [address]  [phone] / [phone] Fax   [phone] / [phone] Fax

## 2021-12-06 NOTE — PROGRESS NOTES
Room EP3    XIHA 2018    -still working out x 3 days    Visit Vitals  /74 (BP 1 Location: Left upper arm, BP Patient Position: Sitting, BP Cuff Size: Adult)   Pulse 64   Ht 6' 2\" (1.88 m)   Wt 187 lb 6.4 oz (85 kg)   SpO2 (!) 7%   BMI 24.06 kg/m²         Chest pain: no  Shortness of breath: no  Edema: no  Palpitations, Skipped beats, Rapid heartbeat: no  Dizziness: no    New diagnosis/Surgeries: no    ER/Hospitalizations: no    Refills:

## 2021-12-06 NOTE — LETTER
12/6/2021    Patient: Alma Delia Reece   YOB: 1940   Date of Visit: 12/6/2021     Roni Bond[address]  [address] [place] [address]    Dear Roni Bond DO,      Thank you for referring Mr. Surendra Harrington to [address]  for evaluation. My notes for this consultation are attached. If you have questions, please do not hesitate to call me. I look forward to following your patient along with you.       Sincerely,    Mariaelena Coffey NP

## 2022-03-05 DIAGNOSIS — B37.2 INTERTRIGINOUS CANDIDIASIS: ICD-10-CM

## 2022-03-07 RX ORDER — NYSTATIN 100000 [USP'U]/G
POWDER TOPICAL
Qty: 60 G | Refills: 2 | Status: SHIPPED | OUTPATIENT
Start: 2022-03-07 | End: 2022-09-30

## 2022-03-10 ENCOUNTER — OFFICE VISIT (OUTPATIENT)
Dept: CARDIOLOGY CLINIC | Age: 82
End: 2022-03-10
Payer: MEDICARE

## 2022-03-10 DIAGNOSIS — Z95.0 CARDIAC PACEMAKER IN SITU: Primary | ICD-10-CM

## 2022-03-10 PROCEDURE — 93294 REM INTERROG EVL PM/LDLS PM: CPT | Performed by: INTERNAL MEDICINE

## 2022-03-10 PROCEDURE — 93296 REM INTERROG EVL PM/IDS: CPT | Performed by: INTERNAL MEDICINE

## 2022-03-10 NOTE — LETTER
3/14/2022 4:05 PM    Mr. Kathleen Henderson  [address]  [address] [address]    Dear Mr. Kathleen Henderson,    We have received your recent remote monitor check of your implanted device on 3/10/22. Your remaining estimated battery life is 3.5 years and your device is working normally & appropriately. There have been no unusual heart rates recorded this session. Your next remote monitor check is scheduled for  6/16/202. This is NOT an in-clinic appointment. This transmission is sent from your home monitor. If you have difficulty sending a transmission, please do NOT call our office. Instead, call tech support for your device as they are better able to assist.    [facility] [address]) [phone]    Your next clinic/office check is scheduled for Wednesday, 12/7/2022 at 10:40 am.  You will have your device checked then see the provider. Please bring a complete list of your medications with strengths and dosages to this appointment. Also, be prepared to discuss any symptoms you may be having since your last visit. Please plan to arrive 10 minutes early to allow time for check-in. [employer] parking is CLOSED once again, due to [address]. The parking lot entrance that is next to [address] is also CLOSED. If you have difficulty walking from the parking lot, please arrange to have someone drop you off to allow time to check into the office. You are allowed to have one visitor accompany you to your appointment and no children under the age of 13 are allowed. Masks are mandatory while in the building. If you have any questions, please call the Pacemaker/ICD clinic at the [facility] location at [phone]. We appreciate you staying remotely connected! Sincerely,    Sujit CLARK, RN  Cardiac Device Clinic Coordinator  [facility] [address] [place] [address].  [address] [place] [address], [address] [facility]  [phone]

## 2022-03-19 PROBLEM — Z53.20 ANTICOAGULANT DRUG DECLINED: Status: ACTIVE | Noted: 2018-07-16

## 2022-03-19 PROBLEM — I48.91 ATRIAL FIBRILLATION (HCC): Status: ACTIVE | Noted: 2018-08-16

## 2022-03-19 PROBLEM — R73.03 PREDIABETES: Status: ACTIVE | Noted: 2019-03-29

## 2022-03-20 PROBLEM — C61 PROSTATE CARCINOMA (HCC): Status: ACTIVE | Noted: 2021-12-01

## 2022-05-11 ENCOUNTER — OFFICE VISIT (OUTPATIENT)
Dept: FAMILY MEDICINE CLINIC | Age: 82
End: 2022-05-11
Payer: MEDICARE

## 2022-05-11 VITALS
SYSTOLIC BLOOD PRESSURE: 164 MMHG | OXYGEN SATURATION: 98 % | HEIGHT: 74 IN | BODY MASS INDEX: 23.61 KG/M2 | DIASTOLIC BLOOD PRESSURE: 96 MMHG | TEMPERATURE: 97.7 F | RESPIRATION RATE: 16 BRPM | WEIGHT: 184 LBS | HEART RATE: 60 BPM

## 2022-05-11 DIAGNOSIS — R41.3 MEMORY LOSS: ICD-10-CM

## 2022-05-11 DIAGNOSIS — C61 PROSTATE CARCINOMA (HCC): ICD-10-CM

## 2022-05-11 DIAGNOSIS — K21.9 GASTROESOPHAGEAL REFLUX DISEASE, UNSPECIFIED WHETHER ESOPHAGITIS PRESENT: Primary | ICD-10-CM

## 2022-05-11 DIAGNOSIS — I48.91 ATRIAL FIBRILLATION, UNSPECIFIED TYPE (HCC): ICD-10-CM

## 2022-05-11 DIAGNOSIS — E53.8 B12 DEFICIENCY: ICD-10-CM

## 2022-05-11 PROCEDURE — G8753 SYS BP > OR = 140: HCPCS | Performed by: FAMILY MEDICINE

## 2022-05-11 PROCEDURE — G8420 CALC BMI NORM PARAMETERS: HCPCS | Performed by: FAMILY MEDICINE

## 2022-05-11 PROCEDURE — 99214 OFFICE O/P EST MOD 30 MIN: CPT | Performed by: FAMILY MEDICINE

## 2022-05-11 PROCEDURE — G8510 SCR DEP NEG, NO PLAN REQD: HCPCS | Performed by: FAMILY MEDICINE

## 2022-05-11 PROCEDURE — G8755 DIAS BP > OR = 90: HCPCS | Performed by: FAMILY MEDICINE

## 2022-05-11 PROCEDURE — G8427 DOCREV CUR MEDS BY ELIG CLIN: HCPCS | Performed by: FAMILY MEDICINE

## 2022-05-11 PROCEDURE — 1101F PT FALLS ASSESS-DOCD LE1/YR: CPT | Performed by: FAMILY MEDICINE

## 2022-05-11 PROCEDURE — G8536 NO DOC ELDER MAL SCRN: HCPCS | Performed by: FAMILY MEDICINE

## 2022-05-11 RX ORDER — LANOLIN ALCOHOL/MO/W.PET/CERES
1000 CREAM (GRAM) TOPICAL DAILY
COMMUNITY

## 2022-05-11 RX ORDER — PANTOPRAZOLE SODIUM 20 MG/1
20 TABLET, DELAYED RELEASE ORAL DAILY
Qty: 30 TABLET | Refills: 2 | Status: SHIPPED | OUTPATIENT
Start: 2022-05-11

## 2022-05-11 RX ORDER — MELATONIN
1000 DAILY
COMMUNITY

## 2022-05-11 RX ORDER — CALCIUM CARBONATE 200(500)MG
1 TABLET,CHEWABLE ORAL DAILY
COMMUNITY

## 2022-05-11 RX ORDER — LYCOPENE 10 MG
CAPSULE ORAL
COMMUNITY

## 2022-05-11 NOTE — PATIENT INSTRUCTIONS
A Healthy Lifestyle: Care Instructions  Your Care Instructions     A healthy lifestyle can help you feel good, stay at a healthy weight, and have plenty of energy for both work and play. A healthy lifestyle is something you can share with your whole family. A healthy lifestyle also can lower your risk for serious health problems, such as high blood pressure, heart disease, and diabetes. You can follow a few steps listed below to improve your health and the health of your family. Follow-up care is a key part of your treatment and safety. Be sure to make and go to all appointments, and call your doctor if you are having problems. It's also a good idea to know your test results and keep a list of the medicines you take. How can you care for yourself at home? · Do not eat too much sugar, fat, or fast foods. You can still have dessert and treats now and then. The goal is moderation. · Start small to improve your eating habits. Pay attention to portion sizes, drink less juice and soda pop, and eat more fruits and vegetables. ? Eat a healthy amount of food. A 3-ounce serving of meat, for example, is about the size of a deck of cards. Fill the rest of your plate with vegetables and whole grains. ? Limit the amount of soda and sports drinks you have every day. Drink more water when you are thirsty. ? Eat plenty of fruits and vegetables every day. Have an apple or some carrot sticks as an afternoon snack instead of a candy bar. Try to have fruits and/or vegetables at every meal.  · Make exercise part of your daily routine. You may want to start with simple activities, such as walking, bicycling, or slow swimming. Try to be active 30 to 60 minutes every day. You do not need to do all 30 to 60 minutes all at once. For example, you can exercise 3 times a day for 10 or 20 minutes.  Moderate exercise is safe for most people, but it is always a good idea to talk to your doctor before starting an exercise program.  · Keep moving. Leela Moore the lawn, work in the garden, or [employer]. Take the stairs instead of the elevator at work. · If you smoke, quit. People who smoke have an increased risk for heart attack, stroke, cancer, and other lung illnesses. Quitting is hard, but there are ways to boost your chance of quitting tobacco for good. ? Use nicotine gum, patches, or lozenges. ? Ask your doctor about stop-smoking programs and medicines. ? Keep trying. In addition to reducing your risk of diseases in the future, you will notice some benefits soon after you stop using tobacco. If you have shortness of breath or asthma symptoms, they will likely get better within a few weeks after you quit. · Limit how much alcohol you drink. Moderate amounts of alcohol (up to 2 drinks a day for men, 1 drink a day for women) are okay. But drinking too much can lead to liver problems, high blood pressure, and other health problems. Family health  If you have a family, there are many things you can do together to improve your health. · Eat meals together as a family as often as possible. · Eat healthy foods. This includes fruits, vegetables, lean meats and dairy, and whole grains. · Include your family in your fitness plan. Most people think of activities such as jogging or tennis as the way to fitness, but there are many ways you and your family can be more active. Anything that makes you breathe hard and gets your heart pumping is exercise. Here are some tips:  ? Walk to do errands or to take your child to school or the bus.  ? Go for a family bike ride after dinner instead of watching TV. Where can you learn more? Go to http://www.gray.com/  Enter Y206 in the search box to learn more about \"A Healthy Lifestyle: Care Instructions. \"  Current as of: June 16, 2021               Content Version: 13.2  © 2646-5678 Healthwise, Incorporated.    Care instructions adapted under license by [employer] (which disclaims liability or warranty for this information). If you have questions about a medical condition or this instruction, always ask your healthcare professional. [address] any warranty or liability for your use of this information.

## 2022-05-11 NOTE — PROGRESS NOTES
Progress Note    he is a 79y.o. year old male who presents for evalution. Subjective:     Pt here with daughter and foods are going right thru him. Then states not diarrhea per se. If he eats he feels the urge to defecate, stool is solid. Is having food come back up and daughter states his wife says he is eating a lot of tums. Per daughter has his night and day mixed up. Sleeping during the day and up at night. Pt states he goes to sleep at 10pm and wakes up at 7am, will eat breakfast and then sits on couch and naps all day. No snoring. Wife does snore very loudly. Pt also with continued memory loss has refused neuro in past.  Pt continues to refuse neurology or neuroimaging. Seeing urology for prostate Ca. Reviewed PmHx, RxHx, FmHx, SocHx, AllgHx and updated and dated in the chart. Review of Systems - negative except as listed above in the HPI    Objective:     Vitals:    05/11/22 1053   BP: (!) 164/96   Pulse: 60   Resp: 16   Temp: 97.7 °F (36.5 °C)   TempSrc: Oral   SpO2: 98%   Weight: 184 lb (83.5 kg)   Height: 6' 2\" (1.88 m)       Current Outpatient Medications   Medication Sig    cyanocobalamin 1,000 mcg tablet Take 1,000 mcg by mouth daily.  lycopene 10 mg cap Take  by mouth.  cholecalciferol (Vitamin D3) (1000 Units /25 mcg) tablet Take 1,000 Units by mouth daily.  calcium carbonate (TUMS) 200 mg calcium (500 mg) chew Take 1 Tablet by mouth daily.  pantoprazole (PROTONIX) 20 mg tablet Take 1 Tablet by mouth daily.  nebivoloL (Bystolic) 20 mg tablet TAKE ONE TABLET BY MOUTH DAILY    tamsulosin (Flomax) 0.4 mg capsule Take 2 Caps by mouth nightly. (Patient taking differently: Take 0.4 mg by mouth nightly.)    aspirin delayed-release 81 mg tablet Take 1 Tab by mouth daily. (Patient taking differently: Take 81 mg by mouth daily. Indications: Takes once or twice a week)    saw palmetto 160 mg cap Take 160 mg by mouth three (3) times daily.     Nyamyc powder APPLY TO AFFECTED AREA(S) FOUR TIMES A DAY. USE UNTIL AT LEAST 24 HOURS AFTER RASH RESOLVED (Patient not taking: Reported on 5/11/2022)    multivitamin (ONE A DAY) tablet Take 1 Tab by mouth daily.  fish oil-omega-3 fatty acids (FISH OIL) 340-1,000 mg capsule Take 1 Cap by mouth daily. (Patient not taking: Reported on 5/11/2022)     No current facility-administered medications for this visit. Physical Examination: General appearance - alert, well appearing, and in no distress  Chest - clear to auscultation, no wheezes, rales or rhonchi, symmetric air entry  Heart - normal rate, regular rhythm, normal S1, S2, no murmurs, rubs, clicks or gallops  Abdomen - soft, nontender, nondistended, no masses or organomegaly      Assessment/ Plan:   Diagnoses and all orders for this visit:    1. Gastroesophageal reflux disease, unspecified whether esophagitis present  -     pantoprazole (PROTONIX) 20 mg tablet; Take 1 Tablet by mouth daily. 2. Atrial fibrillation, unspecified type ([address]  Sees cardology  3. Prostate carcinoma [facility])  Seeing urology for f/up next week  4. B12 deficiency  -     VITAMIN B12; Future  Taking supplement  5. Memory loss  Declines to do anything for this     Follow-up and Dispositions    · Return in about 4 weeks (around 6/8/2022), or if symptoms worsen or fail to improve, for med check protonix. I have discussed the diagnosis with the patient and the intended plan as seen in the above orders. The patient has received an after-visit summary and questions were answered concerning future plans. Pt conveyed understanding of plan.     Medication Side Effects and Warnings were discussed with patient    An electronic signature was used to authenticate this note  Dania Purvis DO

## 2022-05-11 NOTE — PROGRESS NOTES
Chief Complaint   Patient presents with    Memory Loss     Patient presents in office today with c/o memory loss. Also has c/o being unable to keep food in. States that he has no appetite and isn't eating much. When he eats his stomach hurts and he has diarrhea. States that this has been going on since February. Also has c/o sleeping all day. No other concerns. 1. Have you been to the ER, urgent care clinic since your last visit? Hospitalized since your last visit? No    2. Have you seen or consulted any other health care providers outside of the [address] [place] [address] since your last visit? Include any pap smears or colon screening.  No      Learning Assessment 6/29/2018   PRIMARY LEARNER Patient   HIGHEST LEVEL OF EDUCATION - PRIMARY LEARNER  GRADUATED HIGH SCHOOL OR GED   BARRIERS PRIMARY LEARNER NONE   CO-LEARNER CAREGIVER No   PRIMARY LANGUAGE ENGLISH   LEARNER PREFERENCE PRIMARY DEMONSTRATION     -   ANSWERED BY Patient   RELATIONSHIP SELF

## 2022-05-12 LAB — VIT B12 SERPL-MCNC: 480 PG/ML (ref 193–986)

## 2022-05-13 DIAGNOSIS — I48.20 CHRONIC ATRIAL FIBRILLATION (HCC): ICD-10-CM

## 2022-05-13 DIAGNOSIS — I10 ESSENTIAL HYPERTENSION WITH GOAL BLOOD PRESSURE LESS THAN 140/90: ICD-10-CM

## 2022-05-13 RX ORDER — NEBIVOLOL 20 MG/1
TABLET ORAL
Qty: 30 TABLET | Refills: 4 | Status: SHIPPED | OUTPATIENT
Start: 2022-05-13

## 2022-06-15 ENCOUNTER — OFFICE VISIT (OUTPATIENT)
Dept: FAMILY MEDICINE CLINIC | Age: 82
End: 2022-06-15
Payer: MEDICARE

## 2022-06-15 VITALS
HEIGHT: 74 IN | OXYGEN SATURATION: 96 % | WEIGHT: 185 LBS | TEMPERATURE: 97.5 F | SYSTOLIC BLOOD PRESSURE: 144 MMHG | DIASTOLIC BLOOD PRESSURE: 75 MMHG | HEART RATE: 60 BPM | RESPIRATION RATE: 18 BRPM | BODY MASS INDEX: 23.74 KG/M2

## 2022-06-15 DIAGNOSIS — Z00.00 MEDICARE ANNUAL WELLNESS VISIT, SUBSEQUENT: Primary | ICD-10-CM

## 2022-06-15 DIAGNOSIS — K21.9 GASTROESOPHAGEAL REFLUX DISEASE, UNSPECIFIED WHETHER ESOPHAGITIS PRESENT: ICD-10-CM

## 2022-06-15 PROCEDURE — G8753 SYS BP > OR = 140: HCPCS | Performed by: FAMILY MEDICINE

## 2022-06-15 PROCEDURE — 99213 OFFICE O/P EST LOW 20 MIN: CPT | Performed by: FAMILY MEDICINE

## 2022-06-15 PROCEDURE — G8427 DOCREV CUR MEDS BY ELIG CLIN: HCPCS | Performed by: FAMILY MEDICINE

## 2022-06-15 PROCEDURE — 1101F PT FALLS ASSESS-DOCD LE1/YR: CPT | Performed by: FAMILY MEDICINE

## 2022-06-15 PROCEDURE — G8420 CALC BMI NORM PARAMETERS: HCPCS | Performed by: FAMILY MEDICINE

## 2022-06-15 PROCEDURE — G0439 PPPS, SUBSEQ VISIT: HCPCS | Performed by: FAMILY MEDICINE

## 2022-06-15 PROCEDURE — 1123F ACP DISCUSS/DSCN MKR DOCD: CPT | Performed by: FAMILY MEDICINE

## 2022-06-15 PROCEDURE — G8536 NO DOC ELDER MAL SCRN: HCPCS | Performed by: FAMILY MEDICINE

## 2022-06-15 PROCEDURE — G8754 DIAS BP LESS 90: HCPCS | Performed by: FAMILY MEDICINE

## 2022-06-15 PROCEDURE — G8510 SCR DEP NEG, NO PLAN REQD: HCPCS | Performed by: FAMILY MEDICINE

## 2022-06-15 NOTE — PROGRESS NOTES
Chief Complaint   Patient presents with    Follow-up     Patient presents in office today for 1 month f/u. No concerns. 1. Have you been to the ER, urgent care clinic since your last visit? Hospitalized since your last visit? No    2. Have you seen or consulted any other health care providers outside of the [address] [place] [address] since your last visit? Include any pap smears or colon screening.  No    Learning Assessment 6/29/2018   PRIMARY LEARNER Patient   HIGHEST LEVEL OF EDUCATION - PRIMARY LEARNER  GRADUATED HIGH SCHOOL OR GED   BARRIERS PRIMARY LEARNER NONE   CO-LEARNER CAREGIVER No   PRIMARY LANGUAGE ENGLISH   LEARNER PREFERENCE PRIMARY DEMONSTRATION     -   ANSWERED BY Patient   RELATIONSHIP SELF

## 2022-06-15 NOTE — PROGRESS NOTES
Progress Note    he is a 79y.o. year old male who presents for evalution. Subjective:     Pt is not taking the protonix states this made him hurt. States it made his stomach hurt. He took it 1x. Discussed he did not give it a chance to work and he will try this. Weight went up 1 lb. Has the protonix at home. Reviewed PmHx, RxHx, FmHx, SocHx, AllgHx and updated and dated in the chart. Review of Systems - negative except as listed above in the HPI    Objective:     Vitals:    06/15/22 1006   BP: (!) 144/75   Pulse: 60   Resp: 18   Temp: 97.5 °F (36.4 °C)   TempSrc: Oral   SpO2: 96%   Weight: 185 lb (83.9 kg)   Height: 6' 2\" (1.88 m)       Current Outpatient Medications   Medication Sig    nebivoloL (BYSTOLIC) 20 mg tablet TAKE ONE TABLET BY MOUTH DAILY    cyanocobalamin 1,000 mcg tablet Take 1,000 mcg by mouth daily.  lycopene 10 mg cap Take  by mouth.  calcium carbonate (TUMS) 200 mg calcium (500 mg) chew Take 1 Tablet by mouth daily.  pantoprazole (PROTONIX) 20 mg tablet Take 1 Tablet by mouth daily.  tamsulosin (Flomax) 0.4 mg capsule Take 2 Caps by mouth nightly. (Patient taking differently: Take 0.4 mg by mouth nightly.)    multivitamin (ONE A DAY) tablet Take 1 Tab by mouth daily.  aspirin delayed-release 81 mg tablet Take 1 Tab by mouth daily. (Patient taking differently: Take 81 mg by mouth daily. Indications: Takes once or twice a week)    saw palmetto 160 mg cap Take 160 mg by mouth three (3) times daily.  cholecalciferol (Vitamin D3) (1000 Units /25 mcg) tablet Take 1,000 Units by mouth daily.  Nyamyc powder APPLY TO AFFECTED AREA(S) FOUR TIMES A DAY. USE UNTIL AT LEAST 24 HOURS AFTER RASH RESOLVED (Patient not taking: Reported on 5/11/2022)    fish oil-omega-3 fatty acids (FISH OIL) 340-1,000 mg capsule Take 1 Cap by mouth daily. (Patient not taking: Reported on 5/11/2022)     No current facility-administered medications for this visit.        Physical Examination: General appearance - alert, well appearing, and in no distress  Chest - clear to auscultation, no wheezes, rales or rhonchi, symmetric air entry  Heart - normal rate, regular rhythm, normal S1, S2, no murmurs, rubs, clicks or gallops  Abdomen - soft, nontender, nondistended, no masses or organomegaly      Assessment/ Plan:   Diagnoses and all orders for this visit:    1. Medicare annual wellness visit, subsequent    2. Gastroesophageal reflux disease, unspecified whether esophagitis present  Start the protonix again and give it a week to see if it helps w/ reflux     Follow-up and Dispositions    · Return in about 6 weeks (around 7/27/2022), or if symptoms worsen or fail to improve. I have discussed the diagnosis with the patient and the intended plan as seen in the above orders. The patient has received an after-visit summary and questions were answered concerning future plans. Pt conveyed understanding of plan. Medication Side Effects and Warnings were discussed with patient    An electronic signature was used to authenticate this note  Devyn Laughter, DO    This is the Subsequent Medicare Annual Wellness Exam, performed 12 months or more after the Initial AWV or the last Subsequent AWV    I have reviewed the patient's medical history in detail and updated the computerized patient record. Assessment/Plan   Education and counseling provided:  Are appropriate based on today's review and evaluation    1. Medicare annual wellness visit, subsequent  2.  Gastroesophageal reflux disease, unspecified whether esophagitis present       Depression Risk Factor Screening     3 most recent PHQ Screens 6/15/2022   Little interest or pleasure in doing things Not at all   Feeling down, depressed, irritable, or hopeless Not at all   Total Score PHQ 2 0       Alcohol & Drug Abuse Risk Screen    Do you average more than 1 drink per night or more than 7 drinks a week: No    In the past three months have you have had more than 4 drinks containing alcohol on one occasion: No          Functional Ability and Level of Safety    Hearing: Hearing is good. Activities of Daily Living: The home contains: grab bars  Patient does total self care      Ambulation: with no difficulty     Fall Risk:  Fall Risk Assessment, last 12 mths 12/6/2021   Able to walk? Yes   Fall in past 12 months?  0   Number of falls in past 12 months -      Abuse Screen:  Patient is not abused       Cognitive Screening    Has your family/caregiver stated any concerns about your memory: yes - pt refuses any further work up         Health Maintenance Due     Health Maintenance Due   Topic Date Due    COVID-19 Vaccine (1) Never done    Shingrix Vaccine Age 50> (1 of 2) Never done       Patient Care Team   Patient Care Team:  Priyanka Santos DO as PCP - General (Family Medicine)  Priyanka Santos DO as PCP - [facility] Empaneled Provider  Diane Ramos MD (Cardiovascular Disease Physician)  Marylin Little MD as Surgeon (Cardiothoracic Surgery)  Ramona Carr MD (Cardiovascular Disease Physician)    History     Patient Active Problem List   Diagnosis Code    Essential hypertension I10    Chronic atrial fibrillation ([address] I48.20    Pacemaker Z95.0    ACP (advance care planning) Z71.89    Anticoagulant drug declined Z53.20    Atrial fibrillation ([address] I48.91    Prediabetes R73.03    Prostate carcinoma ([address] C61     Past Medical History:   Diagnosis Date    A-fib [facility])     discovered 2008    Arthritis     Knees    Hypertension     Pacemaker     placed  Nov 2011 [facility])    Sick sinus syndrome [facility])     heart monitor showed long pauses (> 4 sec) in 2011      Past Surgical History:   Procedure Laterality Date    HX HERNIA REPAIR      HX PACEMAKER       Current Outpatient Medications   Medication Sig Dispense Refill    nebivoloL (BYSTOLIC) 20 mg tablet TAKE ONE TABLET BY MOUTH DAILY 30 Tablet 4    cyanocobalamin 1,000 mcg tablet Take 1,000 mcg by mouth daily.  lycopene 10 mg cap Take  by mouth.  calcium carbonate (TUMS) 200 mg calcium (500 mg) chew Take 1 Tablet by mouth daily.  pantoprazole (PROTONIX) 20 mg tablet Take 1 Tablet by mouth daily. 30 Tablet 2    tamsulosin (Flomax) 0.4 mg capsule Take 2 Caps by mouth nightly. (Patient taking differently: Take 0.4 mg by mouth nightly.) 180 Cap 3    multivitamin (ONE A DAY) tablet Take 1 Tab by mouth daily.  aspirin delayed-release 81 mg tablet Take 1 Tab by mouth daily. (Patient taking differently: Take 81 mg by mouth daily. Indications: Takes once or twice a week) 60 Tab 0    saw palmetto 160 mg cap Take 160 mg by mouth three (3) times daily.  cholecalciferol (Vitamin D3) (1000 Units /25 mcg) tablet Take 1,000 Units by mouth daily.  Nyamyc powder APPLY TO AFFECTED AREA(S) FOUR TIMES A DAY. USE UNTIL AT LEAST 24 HOURS AFTER RASH RESOLVED (Patient not taking: Reported on 5/11/2022) 60 g 2    fish oil-omega-3 fatty acids (FISH OIL) 340-1,000 mg capsule Take 1 Cap by mouth daily.  (Patient not taking: Reported on 5/11/2022)       No Known Allergies    Family History   Problem Relation Age of Onset    Diabetes Maternal Aunt     Hypertension Mother     Dementia Father      Social History     Tobacco Use    Smoking status: Never Smoker    Smokeless tobacco: Never Used   Substance Use Topics    Alcohol use: No     Alcohol/week: 0.0 standard drinks         Eric Gaxiola,

## 2022-06-15 NOTE — PATIENT INSTRUCTIONS
Medicare Wellness Visit, Male    The best way to live healthy is to have a lifestyle where you eat a well-balanced diet, exercise regularly, limit alcohol use, and quit all forms of tobacco/nicotine, if applicable. Regular preventive services are another way to keep healthy. Preventive services (vaccines, screening tests, monitoring & exams) can help personalize your care plan, which helps you manage your own care. Screening tests can find health problems at the earliest stages, when they are easiest to treat. [facility] follows the current, evidence-based guidelines published by the [facility] An[facility] Marcio ([facility]) when recommending preventive services for our patients. Because we follow these guidelines, sometimes recommendations change over time as research supports it. (For example, a prostate screening blood test is no longer routinely recommended for men with no symptoms). Of course, you and your doctor may decide to screen more often for some diseases, based on your risk and co-morbidities (chronic disease you are already diagnosed with). Preventive services for you include:  - Medicare offers their members a free annual wellness visit, which is time for you and your primary care provider to discuss and plan for your preventive service needs. Take advantage of this benefit every year!  -All adults over age 72 should receive the recommended pneumonia vaccines. Current USPSTF guidelines recommend a series of two vaccines for the best pneumonia protection.   -All adults should have a flu vaccine yearly and tetanus vaccine every 10 years.  -All adults age 48 and older should receive the shingles vaccines (series of two vaccines).        -All adults age 38-68 who are overweight should have a diabetes screening test once every three years.   -Other screening tests & preventive services for persons with diabetes include: an eye exam to screen for diabetic retinopathy, a kidney function test, a foot exam, and stricter control over your cholesterol.   -Cardiovascular screening for adults with routine risk involves an electrocardiogram (ECG) at intervals determined by the provider.   -Colorectal cancer screening should be done for adults age 54-65 with no increased risk factors for colorectal cancer. There are a number of acceptable methods of screening for this type of cancer. Each test has its own benefits and drawbacks. Discuss with your provider what is most appropriate for you during your annual wellness visit. The different tests include: colonoscopy (considered the best screening method), a fecal occult blood test, a fecal DNA test, and sigmoidoscopy.  -All adults born between [facility] should be screened once for Hepatitis C.  -An Abdominal Aortic Aneurysm (AAA) Screening is recommended for men age 73-68 who has ever smoked in their lifetime.      Here is a list of your current Health Maintenance items (your personalized list of preventive services) with a due date:  Health Maintenance Due   Topic Date Due    COVID-19 Vaccine (1) Never done    Shingles Vaccine (1 of 2) Never done

## 2022-06-16 ENCOUNTER — OFFICE VISIT (OUTPATIENT)
Dept: CARDIOLOGY CLINIC | Age: 82
End: 2022-06-16
Payer: MEDICARE

## 2022-06-16 DIAGNOSIS — Z95.0 CARDIAC PACEMAKER IN SITU: Primary | ICD-10-CM

## 2022-06-16 PROCEDURE — 93294 REM INTERROG EVL PM/LDLS PM: CPT | Performed by: INTERNAL MEDICINE

## 2022-06-16 PROCEDURE — 93296 REM INTERROG EVL PM/IDS: CPT | Performed by: INTERNAL MEDICINE

## 2022-06-20 NOTE — PROGRESS NOTES
Scheduled remote transmission. Device functioning appropriately as programmed. See scanned docments.  Chargeable visit

## 2022-09-20 ENCOUNTER — OFFICE VISIT (OUTPATIENT)
Dept: CARDIOLOGY CLINIC | Age: 82
End: 2022-09-20
Payer: MEDICARE

## 2022-09-20 DIAGNOSIS — Z95.0 CARDIAC PACEMAKER IN SITU: Primary | ICD-10-CM

## 2022-09-20 PROCEDURE — 93296 REM INTERROG EVL PM/IDS: CPT | Performed by: INTERNAL MEDICINE

## 2022-09-20 PROCEDURE — 93294 REM INTERROG EVL PM/LDLS PM: CPT | Performed by: INTERNAL MEDICINE

## 2022-09-30 ENCOUNTER — OFFICE VISIT (OUTPATIENT)
Dept: CARDIOLOGY CLINIC | Age: 82
End: 2022-09-30
Payer: MEDICARE

## 2022-09-30 VITALS
SYSTOLIC BLOOD PRESSURE: 150 MMHG | BODY MASS INDEX: 23.75 KG/M2 | OXYGEN SATURATION: 96 % | DIASTOLIC BLOOD PRESSURE: 90 MMHG | WEIGHT: 185 LBS | HEART RATE: 65 BPM

## 2022-09-30 DIAGNOSIS — I48.91 ATRIAL FIBRILLATION, UNSPECIFIED TYPE (HCC): ICD-10-CM

## 2022-09-30 DIAGNOSIS — Z95.0 CARDIAC PACEMAKER IN SITU: Primary | ICD-10-CM

## 2022-09-30 PROCEDURE — G8427 DOCREV CUR MEDS BY ELIG CLIN: HCPCS | Performed by: SPECIALIST

## 2022-09-30 PROCEDURE — 99214 OFFICE O/P EST MOD 30 MIN: CPT | Performed by: SPECIALIST

## 2022-09-30 PROCEDURE — G8420 CALC BMI NORM PARAMETERS: HCPCS | Performed by: SPECIALIST

## 2022-09-30 PROCEDURE — G8753 SYS BP > OR = 140: HCPCS | Performed by: SPECIALIST

## 2022-09-30 PROCEDURE — 93000 ELECTROCARDIOGRAM COMPLETE: CPT | Performed by: SPECIALIST

## 2022-09-30 PROCEDURE — 1123F ACP DISCUSS/DSCN MKR DOCD: CPT | Performed by: SPECIALIST

## 2022-09-30 PROCEDURE — G8432 DEP SCR NOT DOC, RNG: HCPCS | Performed by: SPECIALIST

## 2022-09-30 PROCEDURE — G8536 NO DOC ELDER MAL SCRN: HCPCS | Performed by: SPECIALIST

## 2022-09-30 PROCEDURE — G8755 DIAS BP > OR = 90: HCPCS | Performed by: SPECIALIST

## 2022-09-30 PROCEDURE — 1101F PT FALLS ASSESS-DOCD LE1/YR: CPT | Performed by: SPECIALIST

## 2022-09-30 NOTE — PROGRESS NOTES
Felicia Buerger is a 79 y.o. male          Chief Complaint   Patient presents with    Other     PACEMAKER     Vitals:    09/30/22 1118 09/30/22 1131   BP: (!) 150/92 (!) 150/90   BP 1 Location: Left upper arm Right upper arm   BP Patient Position: Sitting Sitting   BP Cuff Size: Adult Adult   Pulse: 65    Weight: 185 lb (83.9 kg)    SpO2: 96%          Chest pain NO  SOB NO  Dizziness NO  Swelling O  Recent hospital visit NO  Refills NO  COVID VACCINE STATUS NO  HAD COVID?  YES

## 2022-09-30 NOTE — PROGRESS NOTES
CARDIOLOGY OFFICE NOTE    Uli Collins MD, [address], [place], [address] [facility]  Phone [phone]; Fax [phone]  Mobile [phone]   Voice Mail [phone]    Primary care: Dagoberto Mcgee DO       ATTENTION:   This medical record was transcribed using an electronic medical records/speech recognition system. Although proofread, it may and can contain electronic, spelling and other errors. Corrections may be executed at a later time. Please feel free to contact us for any clarifications as needed. Kavya Holguin is a 79 y.o. male with  referred for atrial fibrillation, hypertension status post pacemaker followed by Dr. Darline Correa in the past.          Cardiac risk factors: sedentary life style, male gender, hypertension  I have personally obtained the history from the patient. HISTORY OF PRESENTING ILLNESS    Ms./Mr. Kavya Holguin  79 y.o. is a gentleman who was followed by Dr. Ros Rios in the past and then was followed by Dr. Darline Correa for pacemaker. He returns for follow-up since there is no electrophysiologist or  EP NP. His blood pressure is elevated today and they do not necessarily take it at the house. I asked that they do this in the meantime. He has no interval cardiac issues. No longer anticoagulated secondary to watchman device. His wife is very attentive and excellent and managing his care. She has a history of a bone marrow transplant 4 years ago for AML.      ACTIVE PROBLEM LIST     Patient Active Problem List    Diagnosis Date Noted    Atrial fibrillation ([address] 08/16/2018    Prostate carcinoma ([address] 12/01/2021    Prediabetes 03/29/2019    Anticoagulant drug declined 07/16/2018    ACP (advance care planning) 07/13/2016    Pacemaker 11/02/2015    Essential hypertension 07/20/2015    Chronic atrial fibrillation ([address] 07/20/2015           PAST MEDICAL HISTORY     Past Medical History:   Diagnosis Date    A-artur [facility])     discovered 2008    Arthritis     Knees    Hypertension     Pacemaker     placed  Nov 2011 ([employer])    Sick sinus syndrome [facility])     heart monitor showed long pauses (> 4 sec) in 2011           PAST SURGICAL HISTORY     Past Surgical History:   Procedure Laterality Date    HX HERNIA REPAIR      HX PACEMAKER            ALLERGIES     No Known Allergies       FAMILY HISTORY     Family History   Problem Relation Age of Onset    Diabetes Maternal Aunt     Hypertension Mother     Dementia Father     negative for cardiac disease       SOCIAL HISTORY     Social History     Socioeconomic History    Marital status:    Tobacco Use    Smoking status: Never    Smokeless tobacco: Never   Substance and Sexual Activity    Alcohol use: No     Alcohol/week: 0.0 standard drinks    Drug use: No     Social Determinants of Health     Financial Resource Strain: Low Risk     Difficulty of Paying Living Expenses: Not hard at all   Food Insecurity: No Food Insecurity    Worried About Running Out of Food in the Last Year: Never true    Ran Out of Food in the Last Year: Never true         MEDICATIONS     Current Outpatient Medications   Medication Sig    nebivoloL (BYSTOLIC) 20 mg tablet TAKE ONE TABLET BY MOUTH DAILY    cyanocobalamin 1,000 mcg tablet Take 1,000 mcg by mouth daily. lycopene 10 mg cap Take  by mouth. cholecalciferol (VITAMIN D3) (1000 Units /25 mcg) tablet Take 1,000 Units by mouth daily. calcium carbonate (TUMS) 200 mg calcium (500 mg) chew Take 1 Tablet by mouth daily. pantoprazole (PROTONIX) 20 mg tablet Take 1 Tablet by mouth daily. multivitamin (ONE A DAY) tablet Take 1 Tab by mouth daily. aspirin delayed-release 81 mg tablet Take 1 Tab by mouth daily. fish oil-omega-3 fatty acids 340-1,000 mg capsule Take 1 Capsule by mouth daily. saw palmetto 160 mg cap Take 160 mg by mouth three (3) times daily. Nyamyc powder APPLY TO AFFECTED AREA(S) FOUR TIMES A DAY.  USE UNTIL AT LEAST 24 HOURS AFTER RASH RESOLVED (Patient not taking: No sig reported)    tamsulosin (Flomax) 0.4 mg capsule Take 2 Caps by mouth nightly. (Patient not taking: Reported on 9/30/2022)     No current facility-administered medications for this visit. I have reviewed the nurses notes, vitals, problem list, allergy list, medical history, family, social history and medications. REVIEW OF SYMPTOMS    As per HPI  General: Pt denies excessive weight gain or loss. Pt is able to conduct ADL's  HEENT: Denies blurred vision, headaches, hearing loss, epistaxis and difficulty swallowing. Respiratory: Denies cough, congestion, shortness of breath, LOMELI, wheezing or stridor. Cardiovascular: Denies precordial pain, palpitations, edema or PND  Gastrointestinal: Denies poor appetite, indigestion, abdominal pain or blood in stool  Genitourinary: Denies hematuria, dysuria, increased urinary frequency  Musculoskeletal: Denies joint pain or swelling from muscles or joints  Neurologic: Denies tremor, paresthesias, headache, or sensory motor disturbance  Psychiatric: Denies confusion, insomnia, depression  Integumentray: Denies rash, itching or ulcers. Hematologic: Denies easy bruising, bleeding     PHYSICAL EXAMINATION      Vitals:    09/30/22 1118 09/30/22 1131   BP: (!) 150/92 (!) 150/90   Pulse: 65    SpO2: 96%    Weight: 185 lb (83.9 kg)      General: Well developed, in no acute distress. HEENT: No jaundice, oral mucosa moist, no oral ulcers  Neck: Supple, no stiffness, no lymphadenopathy, supple  Heart: Regular rate and rhythm  Respiratory: Clear bilaterally x 4, no wheezing or rales  Extremities:  No edema, normal cap refill, no cyanosis. Musculoskeletal: No clubbing, no deformities  Neuro: A&Ox3, speech clear, gait stable, cooperative, no focal neurologic deficits  Skin: Skin color is normal. No rashes or lesions. Non diaphoretic, moist.  Abdomen:   Soft, non-tender, bowel sounds are active.        EKG: Date: (9/3/2020 ventricular paced rhythm)     DIAGNOSTIC DATA     1. Pacer  12/11/2015[facility] Change and Lead Revision per Dr. Glenn Ocasio    2. Watchman  8/16/2018:Left atrial appendage occlusion utilizing WATCHMAN device per Dr. Glenn Ocasio    3. Stress Test  9/9/11-Stress Echo-normal, 10.1 mets, 8 min, EF 67%    4. Echo  9/9/11- EF 60%, CAMILA, mild MR/TR/PI, RVSP 42 mmHg  8/17/18-EF 55 % to 60 %, RVE, CAMILA, mild TR    5. Lipids  11/9/16- , HDL 62, , TG 69         LABORATORY DATA       Lab Results   Component Value Date/Time    WBC 5.9 06/29/2020 10:21 AM    HGB 15.6 06/29/2020 10:21 AM    HCT 48.9 06/29/2020 10:21 AM    PLATELET 224 09/85/9193 10:21 AM    MCV 98.6 06/29/2020 10:21 AM      Lab Results   Component Value Date/Time    Sodium 139 05/12/2021 02:33 PM    Potassium 4.4 05/12/2021 02:33 PM    Chloride 107 05/12/2021 02:33 PM    CO2 26 05/12/2021 02:33 PM    Anion gap 6 05/12/2021 02:33 PM    Glucose 118 (H) 05/12/2021 02:33 PM    BUN 21 (H) 05/12/2021 02:33 PM    Creatinine 0.92 05/12/2021 02:33 PM    BUN/Creatinine ratio 23 (H) 05/12/2021 02:33 PM    GFR est AA >60 05/12/2021 02:33 PM    GFR est non-AA >60 05/12/2021 02:33 PM    Calcium 9.0 05/12/2021 02:33 PM    Bilirubin, total 1.1 (H) 05/12/2021 02:33 PM    Alk. phosphatase 118 (H) 05/12/2021 02:33 PM    Protein, total 7.1 05/12/2021 02:33 PM    Albumin 3.8 05/12/2021 02:33 PM    Globulin 3.3 05/12/2021 02:33 PM    A-G Ratio 1.2 05/12/2021 02:33 PM    ALT (SGPT) 24 05/12/2021 02:33 PM            ICD-10-CM ICD-9-CM   1. Cardiac pacemaker in situ  Z95.0 V45.01   2. Atrial fibrillation, unspecified type (HCC)  I48.91 427.31        ASSESSMENT/RECOMMENDATIONS:.      1.  Dyslipidemia  -At his age I do believe that we do not need to be once who is undergone while got stuff to do her still aggressive in terms of treating the dyslipidemia but we will check a cholesterol profile and if it is mildly elevated may consider a low-dose statin.   2.  PPM for SSS  -Scheduled December 9 for pacemaker evaluation asked that he see the NP from EP  3. Atrial fibrillation  -He does have a watchman device in  -No issues with irregularity in rhythm  4. Hypertension  -They will keep track of his blood pressure over the next several weeks and provide me numbers  -I asked him to take his blood pressure sitting and standing  -Reduce sodium intake    Follow-up with me in 6 months  Orders Placed This Encounter    AMB POC EKG ROUTINE W/ 12 LEADS, INTER & REP     Order Specific Question:   Reason for Exam:     Answer:   PACER       We discussed the expected course, resolution and complications of the diagnosis(es) in detail. Medication risks, benefits, costs, interactions, and alternatives were discussed as indicated. I advised him to contact the office if his condition worsens, changes or fails to improve as anticipated. He expressed understanding with the diagnosis(es) and plan          Follow-up and Dispositions    Return in about 6 months (around 3/30/2023). I have discussed the diagnosis with  Christiane Null and the intended plan as seen in the above orders. Questions were answered concerning future plans. I have discussed medication side effects and warnings with the patient as well. Thank you,  Zakiya Mancuso DO for involving me in the care of  Christiane Null. Please do not hesitate to contact me for further questions/concerns. Uli Garcia MD, [facility] [address]      [place], [address] [place] [address] Drive      [phone] / [phone] Fax

## 2022-09-30 NOTE — LETTER
9/30/2022    Patient: Elinor Gallegos   YOB: 1940   Date of Visit: 9/30/2022     Robert Sauer[address]  [address] [place] [address]    Dear Robert Sauer DO,      Thank you for referring Mr. Maddy Holliday to [address]  for evaluation. My notes for this consultation are attached. If you have questions, please do not hesitate to call me. I look forward to following your patient along with you.       Sincerely,    Yani Cummings MD

## 2022-09-30 NOTE — PATIENT INSTRUCTIONS

## 2022-10-07 LAB
ALBUMIN SERPL-MCNC: 4.2 G/DL (ref 3.6–4.6)
ALP SERPL-CCNC: 95 IU/L (ref 44–121)
ALT SERPL-CCNC: 10 IU/L (ref 0–44)
AST SERPL-CCNC: 18 IU/L (ref 0–40)
BILIRUB DIRECT SERPL-MCNC: 0.28 MG/DL (ref 0–0.4)
BILIRUB SERPL-MCNC: 1.5 MG/DL (ref 0–1.2)
CHOLEST SERPL-MCNC: 153 MG/DL (ref 100–199)
HDLC SERPL-MCNC: 49 MG/DL
LDLC SERPL CALC-MCNC: 89 MG/DL (ref 0–99)
PROT SERPL-MCNC: 6.9 G/DL (ref 6–8.5)
TRIGL SERPL-MCNC: 76 MG/DL (ref 0–149)
VLDLC SERPL CALC-MCNC: 15 MG/DL (ref 5–40)

## 2022-10-20 DIAGNOSIS — I48.91 ATRIAL FIBRILLATION, UNSPECIFIED TYPE (HCC): ICD-10-CM

## 2022-10-20 DIAGNOSIS — Z95.0 CARDIAC PACEMAKER IN SITU: ICD-10-CM

## 2022-10-20 DIAGNOSIS — Z13.220 SCREENING CHOLESTEROL LEVEL: Primary | ICD-10-CM

## 2022-12-01 NOTE — PROGRESS NOTES
Cardiac Electrophysiology Office Follow-up    NAME:  Kofi Martinez   :  1940  MRM:  [id number]    Date:  2022     Primary Cardiologist: Dr. Tiffany Adler and Plan:     1. Chronic atrial fibrillation ([facility]  2. Pacemaker  3. Sick sinus syndrome ([facility] [address] [facility]  4. Essential hypertension  5. Presence of Watchman left atrial appendage closure device       SSS, s/p pacemaker. Device Interrogation    Device: [employer]: 2.5 years  Programmed Mode: VVIR     RV lead    Intrinsic amplitude:  13.4 mV    Pacing impedance:  645 ohms    Pacing threshold: 1.0V @ 0.4ms    %pacin    No sustained VT/VF or therapies    Programming changes:  Iterative programming was performed to test the leads sensing and threshold parameters without any permanent changes. Permanent atrial fibrillation.   - Rate controlled with Bystolic.   - S/p Watchman implant in 2018 with Dr. Attila Land .  - Continue aspirin. No bleeding issues. Hypertension.   - Controlled on Bystolic. Continue Q3 month remote device transmissions. Follow-up in the EP clinic in one year, or sooner for any concerns. Subjective: Kofi Martinez, a 79y.o. year-old who presents for followup of pacemaker. He is doing well from a cardiac standpoint and denies chest pain, palpitations, dyspnea, dizziness or syncope. Review of Systems:   12 point review of systems was performed.  All negative except for HPI    Exam:     Physical Exam:  /82 (BP 1 Location: Right upper arm, BP Patient Position: Sitting, BP Cuff Size: Large adult)   Pulse 60   Resp 18   Ht 6' 2\" (1.88 m)   Wt 82.6 kg (182 lb)   SpO2 98%   BMI 23.37 kg/m²     PHYSICAL EXAM  General:  Alert and oriented, in no acute distress  Head:  Atraumatic, normocephalic  Eyes:  extraocular muscles intact  Neck:  Supple, normal range of motion  Lungs:  Clear to auscultation bilaterally, no wheezes/rales/rhonchi   Cardiovascular:  Regular rate and rhythm, normal S1-S2, no murmurs/rubs/gallops  Abdomen:  Soft, nontender, nondistended, normoactive bowel sounds  Skin:  Intact, no rash  Extremities:, no clubbing, cyanosis, or edema  Musculoskeletal: normal range of motion  Neurological:  Alert and oriented, no focal neurologic deficits  Psychiatric:  Normal mood and affect      Medications:     Current Outpatient Medications   Medication Sig    nebivoloL (BYSTOLIC) 20 mg tablet TAKE ONE TABLET BY MOUTH DAILY    cyanocobalamin 1,000 mcg tablet Take 1,000 mcg by mouth daily. lycopene 10 mg cap Take  by mouth. cholecalciferol (VITAMIN D3) (1000 Units /25 mcg) tablet Take 1,000 Units by mouth daily. pantoprazole (PROTONIX) 20 mg tablet Take 1 Tablet by mouth daily. multivitamin (ONE A DAY) tablet Take 1 Tab by mouth daily. aspirin delayed-release 81 mg tablet Take 1 Tab by mouth daily. fish oil-omega-3 fatty acids 340-1,000 mg capsule Take 1 Capsule by mouth daily. saw palmetto 160 mg cap Take 160 mg by mouth three (3) times daily. calcium carbonate (TUMS) 200 mg calcium (500 mg) chew Take 1 Tablet by mouth daily. No current facility-administered medications for this visit. Diagnostic Data Review:       Results for orders placed or performed during the hospital encounter of 08/06/18   EKG, 12 LEAD, INITIAL   Result Value Ref Range    Ventricular Rate 60 BPM    Atrial Rate 197 BPM    QRS Duration 170 ms    Q-T Interval 470 ms    QTC Calculation (Bezet) 470 ms    Calculated R Axis -93 degrees    Calculated T Axis 56 degrees    Diagnosis       Wide QRS rhythm  Right bundle branch block  Possible Lateral infarct , age undetermined  Inferior infarct , age undetermined  No previous ECGs available  Confirmed by Denise Johnson ([id number]) on 8/6/2018 10:02:29 AM        1. Pacer  12/11/2015[facility] [place] [facility] Change and Lead Revision per Dr. Maria A Ceja    2.  Watchman  8/16/2018:Left atrial appendage occlusion utilizing WATCHMAN device per Dr. Jose Juan Poole    3. Stress Test  9/9/11-Stress Echo-normal, 10.1 mets, 8 min, EF 67%    4. Echo  9/9/11- EF 60%, CAMILA, mild MR/TR/PI, RVSP 42 mmHg  8/17/18-EF 55 % to 60 %, RVE, CAMILA, mild TR    5. Lipids  11/9/16- , HDL 62, , TG 69              ___________________________________________________    Hilton Montanez NP    Cardiac Electrophysiology  [facility]  [address]  [address] [facility], [address] [place] [address] [phone]

## 2022-12-09 ENCOUNTER — OFFICE VISIT (OUTPATIENT)
Dept: CARDIOLOGY CLINIC | Age: 82
End: 2022-12-09

## 2022-12-09 ENCOUNTER — OFFICE VISIT (OUTPATIENT)
Dept: CARDIOLOGY CLINIC | Age: 82
End: 2022-12-09
Payer: MEDICARE

## 2022-12-09 VITALS
HEIGHT: 74 IN | BODY MASS INDEX: 23.36 KG/M2 | SYSTOLIC BLOOD PRESSURE: 138 MMHG | RESPIRATION RATE: 18 BRPM | HEART RATE: 60 BPM | OXYGEN SATURATION: 98 % | WEIGHT: 182 LBS | DIASTOLIC BLOOD PRESSURE: 82 MMHG

## 2022-12-09 DIAGNOSIS — I49.5 SICK SINUS SYNDROME (HCC): ICD-10-CM

## 2022-12-09 DIAGNOSIS — I48.20 CHRONIC ATRIAL FIBRILLATION (HCC): Primary | ICD-10-CM

## 2022-12-09 DIAGNOSIS — Z95.0 CARDIAC PACEMAKER IN SITU: Primary | ICD-10-CM

## 2022-12-09 DIAGNOSIS — Z95.818 PRESENCE OF WATCHMAN LEFT ATRIAL APPENDAGE CLOSURE DEVICE: ICD-10-CM

## 2022-12-09 DIAGNOSIS — I10 ESSENTIAL HYPERTENSION: ICD-10-CM

## 2022-12-09 DIAGNOSIS — Z95.0 PACEMAKER: ICD-10-CM

## 2022-12-09 NOTE — PROGRESS NOTES
Room #: 5    No complaints. Chief Complaint   Patient presents with    Annual Exam    Pacemaker Check    Irregular Heart Beat     AF    Other     SSS       Visit Vitals  /82 (BP 1 Location: Right upper arm, BP Patient Position: Sitting, BP Cuff Size: Large adult)   Pulse 60   Resp 18   Ht 6' 2\" (1.88 m)   Wt 182 lb (82.6 kg)   SpO2 98%   BMI 23.37 kg/m²         Chest pain:  NO  Shortness of breath:  NO  Edema: NO  Palpitations, skipped beats, rapid heartbeat:  NO  Dizziness:  NO    1. Have you been to the ER, urgent care clinic since your last visit? Hospitalized since your last visit? NO    2. Have you seen or consulted any other health care providers outside of the [address] [place] [address] since your last visit? Include any pap smears or colon screening.  NO      Refills:  NO

## 2023-01-30 ENCOUNTER — TELEPHONE (OUTPATIENT)
Dept: CARDIOLOGY CLINIC | Age: 83
End: 2023-01-30

## 2023-01-30 NOTE — TELEPHONE ENCOUNTER
Pt daughter called to speak with the nurse about pt bp, pt went for pre-op on 1/27/23 for hernia repair and the pt bp was really elevated, pt will need to be cleared for surgery,  the daughter stated the wife took the pt bp over the weekend and this morning but not sure if cuff is acerate,pt scheduled for procedure on 2/8/23 please advise         Vivien Sheryl (daughter)  [phone]

## 2023-02-02 DIAGNOSIS — I10 ESSENTIAL HYPERTENSION WITH GOAL BLOOD PRESSURE LESS THAN 140/90: ICD-10-CM

## 2023-02-02 DIAGNOSIS — I48.20 CHRONIC ATRIAL FIBRILLATION (HCC): ICD-10-CM

## 2023-02-02 RX ORDER — NEBIVOLOL 20 MG/1
TABLET ORAL
Qty: 60 TABLET | Refills: 5 | Status: SHIPPED | OUTPATIENT
Start: 2023-02-02

## 2023-02-06 RX ORDER — AMLODIPINE BESYLATE 5 MG/1
5 TABLET ORAL DAILY
Qty: 30 TABLET | Refills: 5 | Status: SHIPPED | OUTPATIENT
Start: 2023-02-06

## 2023-02-06 RX ORDER — AMLODIPINE BESYLATE 5 MG/1
5 TABLET ORAL DAILY
COMMUNITY
End: 2023-02-06 | Stop reason: SDUPTHER

## 2023-02-27 ENCOUNTER — TELEPHONE (OUTPATIENT)
Dept: CARDIOLOGY CLINIC | Age: 83
End: 2023-02-27

## 2023-02-27 RX ORDER — AMLODIPINE BESYLATE 10 MG/1
10 TABLET ORAL DAILY
COMMUNITY
End: 2023-02-27 | Stop reason: SDUPTHER

## 2023-02-27 RX ORDER — AMLODIPINE BESYLATE 10 MG/1
10 TABLET ORAL DAILY
Qty: 90 TABLET | Refills: 2 | Status: SHIPPED | OUTPATIENT
Start: 2023-02-27

## 2023-02-27 NOTE — TELEPHONE ENCOUNTER
Pt's daughter is calling because the doctor double up on her father's amlodipine 5 mg. Prescription was suppose to be called into the pharmacy on 2/24/23 and it has not been. Pt's daughter is calling because he is completely out of his medicine. Pt is suppose to have one pill of 10 mg to take now instead of 2 5 mg pills. Pharmacy is confirmed.     Please assist.      [phone] Flora Azul (daughter)

## 2023-03-14 ENCOUNTER — OFFICE VISIT (OUTPATIENT)
Dept: CARDIOLOGY CLINIC | Age: 83
End: 2023-03-14

## 2023-03-14 DIAGNOSIS — Z95.0 PACEMAKER: Primary | ICD-10-CM

## 2023-03-16 ENCOUNTER — OFFICE VISIT (OUTPATIENT)
Dept: FAMILY MEDICINE CLINIC | Age: 83
End: 2023-03-16

## 2023-03-16 VITALS
SYSTOLIC BLOOD PRESSURE: 123 MMHG | RESPIRATION RATE: 18 BRPM | HEART RATE: 60 BPM | DIASTOLIC BLOOD PRESSURE: 76 MMHG | OXYGEN SATURATION: 94 % | TEMPERATURE: 97.4 F | WEIGHT: 193 LBS | BODY MASS INDEX: 24.77 KG/M2 | HEIGHT: 74 IN

## 2023-03-16 DIAGNOSIS — C61 PROSTATE CARCINOMA (HCC): ICD-10-CM

## 2023-03-16 DIAGNOSIS — I49.5 SICK SINUS SYNDROME (HCC): ICD-10-CM

## 2023-03-16 DIAGNOSIS — R60.0 LEG EDEMA, RIGHT: Primary | ICD-10-CM

## 2023-03-16 DIAGNOSIS — R60.0 BILATERAL LEG EDEMA: ICD-10-CM

## 2023-03-16 DIAGNOSIS — R01.1 HEART MURMUR: ICD-10-CM

## 2023-03-16 DIAGNOSIS — I10 ESSENTIAL HYPERTENSION: ICD-10-CM

## 2023-03-16 DIAGNOSIS — Z00.00 MEDICARE ANNUAL WELLNESS VISIT, SUBSEQUENT: ICD-10-CM

## 2023-03-16 DIAGNOSIS — R73.03 PREDIABETES: ICD-10-CM

## 2023-03-16 NOTE — PROGRESS NOTES
Chief Complaint   Patient presents with    Annual Wellness Visit    Labs            Patient presents in office today for AMW visit and fasting labs. Wife states that he had a hernia repair last month and didn't follow up with the surgeon. She would like to have his scar looked at to make sure it is healing well. She also states that he has some swelling and tightness in his right leg. Denies any pain. No other concerns. 1. Have you been to the ER, urgent care clinic since your last visit? Hospitalized since your last visit? No    2. Have you seen or consulted any other health care providers outside of the [address] [place] [address] since your last visit? Include any pap smears or colon screening.  No      Learning Assessment 6/29/2018   PRIMARY LEARNER Patient   HIGHEST LEVEL OF EDUCATION - PRIMARY LEARNER  GRADUATED HIGH SCHOOL OR GED   BARRIERS PRIMARY LEARNER NONE   CO-LEARNER CAREGIVER No   PRIMARY LANGUAGE ENGLISH   LEARNER PREFERENCE PRIMARY DEMONSTRATION     -   ANSWERED BY Patient   RELATIONSHIP SELF

## 2023-03-16 NOTE — PROGRESS NOTES
Progress Note    he is a 79y.o. year old male who presents for evalution. Subjective:     Pt here for his [address] and he had hernia repair did f/up with surgeon but would like for me to look at it. No issues. He is having so swelling in his R leg and wife states it is hard as a rock and his foot turns red. States this was before the surgery. No pain in leg. Edema in both legs but the R has worsened. No pain in either  Reviewed PmHx, RxHx, FmHx, SocHx, AllgHx and updated and dated in the chart. Review of Systems - negative except as listed above in the HPI    Objective:     Vitals:    03/16/23 0908   BP: 123/76   Pulse: 60   Resp: 18   Temp: 97.4 °F (36.3 °C)   TempSrc: Oral   SpO2: 94%   Weight: 193 lb (87.5 kg)   Height: 6' 2\" (1.88 m)       Current Outpatient Medications   Medication Sig    amLODIPine (NORVASC) 10 mg tablet Take 1 Tablet by mouth daily. nebivoloL (BYSTOLIC) 20 mg tablet TAKE ONE TABLET BY MOUTH DAILY    cyanocobalamin 1,000 mcg tablet Take 1,000 mcg by mouth daily. lycopene 10 mg cap Take  by mouth. cholecalciferol (VITAMIN D3) (1000 Units /25 mcg) tablet Take 1,000 Units by mouth daily. calcium carbonate (TUMS) 200 mg calcium (500 mg) chew Take 1 Tablet by mouth daily. pantoprazole (PROTONIX) 20 mg tablet Take 1 Tablet by mouth daily. multivitamin (ONE A DAY) tablet Take 1 Tab by mouth daily. aspirin delayed-release 81 mg tablet Take 1 Tab by mouth daily. fish oil-omega-3 fatty acids 340-1,000 mg capsule Take 1 Capsule by mouth daily. saw palmetto 160 mg cap Take 160 mg by mouth three (3) times daily. No current facility-administered medications for this visit.        Physical Examination: General appearance - alert, well appearing, and in no distress  Chest - clear to auscultation, no wheezes, rales or rhonchi, symmetric air entry  Heart - systolic murmur 2/6 throughout the precordium  Extremities - 2+ edema b/l  with slightly worse R compared to left.  No ttp. Right leg very firm nontender no erythema some chronic venous changes of the skin bilateral.    Assessment/ Plan:   Diagnoses and all orders for this visit:    1. Leg edema, right  -     DUPLEX LOWER EXT VENOUS BILAT; Future  -     METABOLIC PANEL, COMPREHENSIVE; Future    2. Sick sinus syndrome (HCC)  S/p ppm  3. Prostate carcinoma [facility])  Folows with urology  4. Bilateral leg edema  -     NT-PRO BNP; Future  -     DUPLEX LOWER EXT VENOUS BILAT; Future  -     METABOLIC PANEL, COMPREHENSIVE; Future    5. Essential hypertension  -     METABOLIC PANEL, COMPREHENSIVE; Future    6. Prediabetes  -     HEMOGLOBIN A1C WITH EAG; Future    7. Medicare annual wellness visit, subsequent    8. Heart murmur   Seems new, last Echo in 2018 aortic valve not well visualized. [facility]. Pt is asymptomatic. Doing his ADL's with no dizzy spells etc.  Follow-up and Dispositions    Return in about 6 months (around 9/16/2023), or if symptoms worsen or fail to improve. I have discussed the diagnosis with the patient and the intended plan as seen in the above orders. The patient has received an after-visit summary and questions were answered concerning future plans. Pt conveyed understanding of plan. Medication Side Effects and Warnings were discussed with patient    An electronic signature was used to authenticate this note  Tuan Mccaining, DO      This is the Subsequent Medicare Annual Wellness Exam, performed 12 months or more after the Initial AWV or the last Subsequent AWV    I have reviewed the patient's medical history in detail and updated the computerized patient record. Assessment/Plan   Education and counseling provided:  Are appropriate based on today's review and evaluation    1. Leg edema, right  -     DUPLEX LOWER EXT VENOUS BILAT; Future  -     METABOLIC PANEL, COMPREHENSIVE; Future  2. Sick sinus syndrome ([address]  3.  Prostate carcinoma ([facility] [address]  4. Bilateral leg edema  -     NT-PRO BNP; Future  -     DUPLEX LOWER EXT VENOUS BILAT; Future  -     METABOLIC PANEL, COMPREHENSIVE; Future  5. Essential hypertension  -     METABOLIC PANEL, COMPREHENSIVE; Future  6. Prediabetes  -     HEMOGLOBIN A1C WITH EAG; Future  7. Medicare annual wellness visit, subsequent  8. Heart murmur       Depression Risk Factor Screening     3 most recent PHQ Screens 3/16/2023   Little interest or pleasure in doing things Not at all   Feeling down, depressed, irritable, or hopeless Not at all   Total Score PHQ 2 0       Alcohol & Drug Abuse Risk Screen    Do you average more than 1 drink per night or more than 7 drinks a week: No    In the past three months have you have had more than 4 drinks containing alcohol on one occasion: No          Functional Ability and Level of Safety    Hearing: Hearing is good. Activities of Daily Living: The home contains: grab bars  Does not drive      Ambulation: with no difficulty     Fall Risk:  Fall Risk Assessment, last 12 mths 12/9/2022   Able to walk? Yes   Fall in past 12 months? 0   Do you feel unsteady?  0   Are you worried about falling 0   Number of falls in past 12 months -      Abuse Screen:  Patient is not abused       Cognitive Screening    Has your family/caregiver stated any concerns about your memory: Pt declines discussion         Health Maintenance Due     Health Maintenance Due   Topic Date Due    COVID-19 Vaccine (1) Never done    Shingles Vaccine (1 of 2) Never done    Flu Vaccine (1) 08/01/2022       Patient Care Team   Patient Care Team:  Kala Vasquez DO as PCP - General (Family Medicine)  Kala Vasquez DO as PCP - [facility] Empaneled Provider  Cate Reed MD (Cardiovascular Disease Physician)  Rashel Franks MD (Specialist Undefined)    History     Patient Active Problem List   Diagnosis Code    Essential hypertension I10    Chronic atrial fibrillation [facility]) I48.20    Pacemaker Z95.0    ACP (advance care planning) Z71.89    Anticoagulant drug declined Z53.20    Atrial fibrillation (HCC) I48.91    Prediabetes R73.03    Prostate carcinoma (HCC) C61    Sick sinus syndrome ([address] I49.5    Presence of Watchman left atrial appendage closure device Z95.818     Past Medical History:   Diagnosis Date    A-fib [facility])     discovered 2008    Arthritis     Knees    Hypertension     Pacemaker     placed  Nov 2011 ([address])    Sick sinus syndrome [facility])     heart monitor showed long pauses (> 4 sec) in 2011      Past Surgical History:   Procedure Laterality Date    HX HERNIA REPAIR      HX PACEMAKER       Current Outpatient Medications   Medication Sig Dispense Refill    amLODIPine (NORVASC) 10 mg tablet Take 1 Tablet by mouth daily. 90 Tablet 2    nebivoloL (BYSTOLIC) 20 mg tablet TAKE ONE TABLET BY MOUTH DAILY 60 Tablet 5    cyanocobalamin 1,000 mcg tablet Take 1,000 mcg by mouth daily. lycopene 10 mg cap Take  by mouth. cholecalciferol (VITAMIN D3) (1000 Units /25 mcg) tablet Take 1,000 Units by mouth daily. calcium carbonate (TUMS) 200 mg calcium (500 mg) chew Take 1 Tablet by mouth daily. pantoprazole (PROTONIX) 20 mg tablet Take 1 Tablet by mouth daily. 30 Tablet 2    multivitamin (ONE A DAY) tablet Take 1 Tab by mouth daily. aspirin delayed-release 81 mg tablet Take 1 Tab by mouth daily. 60 Tab 0    fish oil-omega-3 fatty acids 340-1,000 mg capsule Take 1 Capsule by mouth daily. saw palmetto 160 mg cap Take 160 mg by mouth three (3) times daily.        No Known Allergies    Family History   Problem Relation Age of Onset    Diabetes Maternal Aunt     Hypertension Mother     Dementia Father      Social History     Tobacco Use    Smoking status: Never    Smokeless tobacco: Never   Substance Use Topics    Alcohol use: No     Alcohol/week: 0.0 standard drinks         Rodrigo Gaxiola, DO

## 2023-03-16 NOTE — PATIENT INSTRUCTIONS
Medicare Wellness Visit, Male    The best way to live healthy is to have a lifestyle where you eat a well-balanced diet, exercise regularly, limit alcohol use, and quit all forms of tobacco/nicotine, if applicable. Regular preventive services are another way to keep healthy. Preventive services (vaccines, screening tests, monitoring & exams) can help personalize your care plan, which helps you manage your own care. Screening tests can find health problems at the earliest stages, when they are easiest to treat. [facility] follows the current, evidence-based guidelines published by the [facility] ([facility]) when recommending preventive services for our patients. Because we follow these guidelines, sometimes recommendations change over time as research supports it. (For example, a prostate screening blood test is no longer routinely recommended for men with no symptoms). Of course, you and your doctor may decide to screen more often for some diseases, based on your risk and co-morbidities (chronic disease you are already diagnosed with). Preventive services for you include:  - Medicare offers their members a free annual wellness visit, which is time for you and your primary care provider to discuss and plan for your preventive service needs.  Take advantage of this benefit every year!    -Over the age of 72 should receive the recommended pneumonia vaccines.   -All adults should have a flu vaccine yearly.  -All adults should receive a tetanus vaccine every 10 years.  -Over the age of 48 should receive the shingles vaccines.    -All adults should be screened once for Hepatitis C.  -All adults age 38-68 who are overweight should have a diabetes screening test once every three years.   -Other screening tests & preventive services for persons with diabetes include: an eye exam to screen for diabetic retinopathy, a kidney function test, a foot exam, and stricter control over your cholesterol.   -Cardiovascular screening for adults with routine risk involves an electrocardiogram (ECG) at intervals determined by the provider.     -Colorectal cancer screening should be done for adults age 43-69 with no increased risk factors for colorectal cancer. There are a number of acceptable methods of screening for this type of cancer. Each test has its own benefits and drawbacks. Discuss with your provider what is most appropriate for you during your annual wellness visit. The different tests include: colonoscopy (considered the best screening method), a fecal occult blood test, a fecal DNA test, and sigmoidoscopy.    -Lung cancer screening is recommended annually with a low dose CT scan for adults between age 54 and 68, who have smoked at least 30 pack years (equivalent of 1 pack per day for 30 days), and who is a current smoker or quit less than 15 years ago. -An Abdominal Aortic Aneurysm (AAA) Screening is recommended for men age 73-68 who has ever smoked in their lifetime.      Here is a list of your current Health Maintenance items (your personalized list of preventive services) with a due date:  Health Maintenance Due   Topic Date Due    COVID-19 Vaccine (1) Never done    Shingles Vaccine (1 of 2) Never done    Yearly Flu Vaccine (1) 08/01/2022

## 2023-03-17 DIAGNOSIS — I50.9 ACUTE CONGESTIVE HEART FAILURE, UNSPECIFIED HEART FAILURE TYPE (HCC): Primary | ICD-10-CM

## 2023-03-17 LAB
ALBUMIN SERPL-MCNC: 3.8 G/DL (ref 3.5–5)
ALBUMIN/GLOB SERPL: 1.2 (ref 1.1–2.2)
ALP SERPL-CCNC: 96 U/L (ref 45–117)
ALT SERPL-CCNC: 21 U/L (ref 12–78)
ANION GAP SERPL CALC-SCNC: 7 MMOL/L (ref 5–15)
AST SERPL-CCNC: 17 U/L (ref 15–37)
BILIRUB SERPL-MCNC: 1.5 MG/DL (ref 0.2–1)
BNP SERPL-MCNC: 2453 PG/ML
BUN SERPL-MCNC: 28 MG/DL (ref 6–20)
BUN/CREAT SERPL: 24 (ref 12–20)
CALCIUM SERPL-MCNC: 9.4 MG/DL (ref 8.5–10.1)
CHLORIDE SERPL-SCNC: 106 MMOL/L (ref 97–108)
CO2 SERPL-SCNC: 27 MMOL/L (ref 21–32)
CREAT SERPL-MCNC: 1.15 MG/DL (ref 0.7–1.3)
EST. AVERAGE GLUCOSE BLD GHB EST-MCNC: 140 MG/DL
GLOBULIN SER CALC-MCNC: 3.3 G/DL (ref 2–4)
GLUCOSE SERPL-MCNC: 121 MG/DL (ref 65–100)
HBA1C MFR BLD: 6.5 % (ref 4–5.6)
POTASSIUM SERPL-SCNC: 4.2 MMOL/L (ref 3.5–5.1)
PROT SERPL-MCNC: 7.1 G/DL (ref 6.4–8.2)
SODIUM SERPL-SCNC: 140 MMOL/L (ref 136–145)

## 2023-03-17 RX ORDER — POTASSIUM CHLORIDE 750 MG/1
10 TABLET, EXTENDED RELEASE ORAL DAILY
Qty: 30 TABLET | Refills: 1 | Status: SHIPPED | OUTPATIENT
Start: 2023-03-17

## 2023-03-17 RX ORDER — FUROSEMIDE 20 MG/1
TABLET ORAL
Qty: 30 TABLET | Refills: 1 | Status: SHIPPED | OUTPATIENT
Start: 2023-03-17

## 2023-03-17 NOTE — PROGRESS NOTES
Your heart failure marker came back elevated. This could be the cause of the swelling in your legs. I still wanted to get the Doppler which has not been scheduled yet and you need to. I went ahead and sent in Lasix 20 mg once daily in the morning. I also sent in potassium since this makes people waste potassium. You will need to take the potassium pill once a day as well. If you are having significant shortness of breath then you will need to go to the emergency department. Otherwise make sure you keep your appointment with the nurse practitioner with cardiology on April 6. Your diabetes is stable at 6.5 currently. We can recheck this in 6 months.

## 2023-03-17 NOTE — PROGRESS NOTES
Spoke with pt's wife in regards to lab results; stated she understood. Will have pt complete doppler.

## 2023-03-20 ENCOUNTER — HOSPITAL ENCOUNTER (OUTPATIENT)
Dept: ULTRASOUND IMAGING | Age: 83
Discharge: HOME | End: 2023-03-20
Attending: FAMILY MEDICINE
Payer: MEDICARE

## 2023-03-20 ENCOUNTER — TELEPHONE (OUTPATIENT)
Dept: FAMILY MEDICINE CLINIC | Age: 83
End: 2023-03-20

## 2023-03-20 DIAGNOSIS — R60.0 BILATERAL LEG EDEMA: ICD-10-CM

## 2023-03-20 DIAGNOSIS — R60.0 LEG EDEMA, RIGHT: ICD-10-CM

## 2023-03-20 PROCEDURE — 93970 EXTREMITY STUDY: CPT

## 2023-03-20 NOTE — TELEPHONE ENCOUNTER
Received call from imaging dept at [facility]. Patient was positive to blood clot. They are holding patient until cleared by provider to leave. I spoke with Dr. Charito Brice who advised that as long as patient is not having any SOB he is okay to go home but needs to follow up in office Wednesday with Dr. Prakash Rico to start on blood thinners. I spoke back with imaging who advised that patient is not experiencing any SOB. Gave them the okay to release patient and they relayed to patient that he will need to follow up with Dr. Prakash Rico. I called and spoke with patient's daughter Dmitry Matamoros (on HIPAA) and advised of findings reported by [facility] and that per Dr. Charito Brice he will need to follow up by Wednesday with Dr. Prakash Rico. Attempted to try to schedule a VV for tomorrow but she said none of her siblings would be able to be with patient tomorrow to complete VV. Worked him in for Wednesday at 10:00am with Dr. Prakash Rico.

## 2023-03-21 RX ORDER — NYSTATIN 100000 [USP'U]/G
POWDER TOPICAL
Qty: 60 G | Refills: 1 | Status: SHIPPED | OUTPATIENT
Start: 2023-03-21

## 2023-03-21 RX ORDER — APIXABAN 5 MG (74)
KIT ORAL
Qty: 1 DOSE PACK | Refills: 0 | Status: SHIPPED | OUTPATIENT
Start: 2023-03-21

## 2023-03-21 NOTE — TELEPHONE ENCOUNTER
Spoke with patient's wife Hunter Candelario. Eliquis starter pack sent then she will go pick this up and he will start this immediately. If they are affordability issues they will let me know right away I will need to bridge him with Lovenox and Coumadin. He is not having any shortness of breath or chest pain. Has follow-up with me in the office tomorrow.

## 2023-03-22 ENCOUNTER — OFFICE VISIT (OUTPATIENT)
Dept: FAMILY MEDICINE CLINIC | Age: 83
End: 2023-03-22
Payer: MEDICARE

## 2023-03-22 VITALS
TEMPERATURE: 97.5 F | HEART RATE: 59 BPM | BODY MASS INDEX: 24.9 KG/M2 | SYSTOLIC BLOOD PRESSURE: 112 MMHG | HEIGHT: 74 IN | WEIGHT: 194 LBS | OXYGEN SATURATION: 94 % | DIASTOLIC BLOOD PRESSURE: 73 MMHG | RESPIRATION RATE: 16 BRPM

## 2023-03-22 DIAGNOSIS — Z51.81 MEDICATION MONITORING ENCOUNTER: ICD-10-CM

## 2023-03-22 DIAGNOSIS — I82.5Y2 CHRONIC DEEP VEIN THROMBOSIS (DVT) OF PROXIMAL VEIN OF LEFT LOWER EXTREMITY (HCC): ICD-10-CM

## 2023-03-22 DIAGNOSIS — R79.89 ELEVATED BRAIN NATRIURETIC PEPTIDE (BNP) LEVEL: Primary | ICD-10-CM

## 2023-03-22 DIAGNOSIS — R60.0 BILATERAL LEG EDEMA: ICD-10-CM

## 2023-03-22 PROCEDURE — 99214 OFFICE O/P EST MOD 30 MIN: CPT | Performed by: FAMILY MEDICINE

## 2023-03-22 PROCEDURE — 1101F PT FALLS ASSESS-DOCD LE1/YR: CPT | Performed by: FAMILY MEDICINE

## 2023-03-22 PROCEDURE — G8432 DEP SCR NOT DOC, RNG: HCPCS | Performed by: FAMILY MEDICINE

## 2023-03-22 PROCEDURE — 3074F SYST BP LT 130 MM HG: CPT | Performed by: FAMILY MEDICINE

## 2023-03-22 PROCEDURE — G8420 CALC BMI NORM PARAMETERS: HCPCS | Performed by: FAMILY MEDICINE

## 2023-03-22 PROCEDURE — 3078F DIAST BP <80 MM HG: CPT | Performed by: FAMILY MEDICINE

## 2023-03-22 PROCEDURE — 1123F ACP DISCUSS/DSCN MKR DOCD: CPT | Performed by: FAMILY MEDICINE

## 2023-03-22 PROCEDURE — G8536 NO DOC ELDER MAL SCRN: HCPCS | Performed by: FAMILY MEDICINE

## 2023-03-22 PROCEDURE — G8427 DOCREV CUR MEDS BY ELIG CLIN: HCPCS | Performed by: FAMILY MEDICINE

## 2023-03-22 NOTE — PATIENT INSTRUCTIONS

## 2023-03-22 NOTE — PROGRESS NOTES
Progress Note    he is a 79y.o. year old male who presents for evalution. Subjective:     Pt here for follow up on DVT in L leg saphenofemoral junction. Tho no hx of DVT. This was read as chronic per the radiologist but he has no DVT history. We will use NOAC for 90 days. He did not start yet waiting for pharm to get in stock so sample given and he took his first dose of 10mg. He did not take the lasix yesterday or today. He has otherwise been taking the lasix since I rx up until Monday. Elevated pro BNp and no CP or SOB. Seeing cardio 4/6  Reviewed PmHx, RxHx, FmHx, SocHx, AllgHx and updated and dated in the chart. Review of Systems - negative except as listed above in the HPI    Objective:     Vitals:    03/22/23 1011   BP: 112/73   Pulse: (!) 59   Resp: 16   Temp: 97.5 °F (36.4 °C)   TempSrc: Oral   SpO2: 94%   Weight: 194 lb (88 kg)   Height: 6' 2\" (1.88 m)       Current Outpatient Medications   Medication Sig    apixaban (ELIQUIS) 5 mg tablet Take 1 Tablet by mouth two (2) times a day. Nyamyc powder APPLY TO AFFECTED AREA(S) FOUR TIMES A DAY. USE UNTIL AT LEAST 24 HOURS AFTER RASH RESOLVED    furosemide (LASIX) 20 mg tablet 1 tab PO Qam    potassium chloride (KLOR-CON M10) 10 mEq tablet Take 1 Tablet by mouth daily. amLODIPine (NORVASC) 10 mg tablet Take 1 Tablet by mouth daily. nebivoloL (BYSTOLIC) 20 mg tablet TAKE ONE TABLET BY MOUTH DAILY    cyanocobalamin 1,000 mcg tablet Take 1,000 mcg by mouth daily. lycopene 10 mg cap Take  by mouth. cholecalciferol (VITAMIN D3) (1000 Units /25 mcg) tablet Take 1,000 Units by mouth daily. calcium carbonate (TUMS) 200 mg calcium (500 mg) chew Take 1 Tablet by mouth daily. pantoprazole (PROTONIX) 20 mg tablet Take 1 Tablet by mouth daily. multivitamin (ONE A DAY) tablet Take 1 Tab by mouth daily. aspirin delayed-release 81 mg tablet Take 1 Tab by mouth daily.     fish oil-omega-3 fatty acids 340-1,000 mg capsule Take 1 Capsule by mouth daily. saw palmetto 160 mg cap Take 160 mg by mouth three (3) times daily. apixaban (Eliquis DVT-PE Treat 30D Start) 5 mg (74 tabs) starter pack As directed (Patient not taking: Reported on 3/22/2023)     No current facility-administered medications for this visit. Physical Examination: General appearance - alert, well appearing, and in no distress  Chest - clear to auscultation, no wheezes, rales or rhonchi, symmetric air entry  Heart - normal rate, regular rhythm, normal S1, S2, no murmurs, rubs, clicks or gallops      Assessment/ Plan:   Diagnoses and all orders for this visit:    1. Elevated brain natriuretic peptide (BNP) level  -     NT-PRO BNP; Future  -     METABOLIC PANEL, BASIC; Future  Restart the Lasix and potassium  2. Medication monitoring encounter  -     METABOLIC PANEL, BASIC; Future    3. Chronic deep vein thrombosis (DVT) of proximal vein of left lower extremity (HCC)  -     apixaban (ELIQUIS) 5 mg tablet; Take 1 Tablet by mouth two (2) times a day. Patient was given a 1 week sample pack of Eliquis and he took his first 2 pills. Discussed dosing with patient and wife and wrote down dosing instructions for patient and wife as well. 4. Bilateral leg edema  -     NT-PRO BNP; Future   Restart lasix and K  Follow-up and Dispositions    Return in about 3 months (around 6/22/2023), or if symptoms worsen or fail to improve. I have discussed the diagnosis with the patient and the intended plan as seen in the above orders. The patient has received an after-visit summary and questions were answered concerning future plans. Pt conveyed understanding of plan.     Medication Side Effects and Warnings were discussed with patient    An electronic signature was used to authenticate this note  Rachel Cazares DO

## 2023-03-22 NOTE — PROGRESS NOTES
Chief Complaint   Patient presents with    Follow-up     Patient presents in office today for follow up from duplex. Wife states that they were unable to  the Eliquis starter pack yesterday. She states that the pharmacy ordered it and said that it should be in today. No other concerns. 1. Have you been to the ER, urgent care clinic since your last visit? Hospitalized since your last visit? No    2. Have you seen or consulted any other health care providers outside of the [address] [place] [address] since your last visit? Include any pap smears or colon screening.  No      Learning Assessment 6/29/2018   PRIMARY LEARNER Patient   HIGHEST LEVEL OF EDUCATION - PRIMARY LEARNER  GRADUATED HIGH SCHOOL OR GED   BARRIERS PRIMARY LEARNER NONE   CO-LEARNER CAREGIVER No   PRIMARY LANGUAGE ENGLISH   LEARNER PREFERENCE PRIMARY DEMONSTRATION     -   ANSWERED BY Patient   RELATIONSHIP SELF

## 2023-03-23 LAB
ANION GAP SERPL CALC-SCNC: 6 MMOL/L (ref 5–15)
BNP SERPL-MCNC: 3526 PG/ML
BUN SERPL-MCNC: 30 MG/DL (ref 6–20)
BUN/CREAT SERPL: 29 (ref 12–20)
CALCIUM SERPL-MCNC: 8.9 MG/DL (ref 8.5–10.1)
CHLORIDE SERPL-SCNC: 108 MMOL/L (ref 97–108)
CO2 SERPL-SCNC: 25 MMOL/L (ref 21–32)
COMMENT, HOLDF: NORMAL
CREAT SERPL-MCNC: 1.03 MG/DL (ref 0.7–1.3)
GLUCOSE SERPL-MCNC: 132 MG/DL (ref 65–100)
POTASSIUM SERPL-SCNC: 4.3 MMOL/L (ref 3.5–5.1)
SAMPLES BEING HELD,HOLD: NORMAL
SODIUM SERPL-SCNC: 139 MMOL/L (ref 136–145)

## 2023-03-23 NOTE — PROGRESS NOTES
Your potassium and kidney functionare normal and this is great news. I hope all is well. Your pBNP is elevated and may indicate volume overload. If you are short of breath please let me know because in that case I would give you a diuretic lasix 20 mg to take for three days to see if you feel better. I also would like for you to have an echocardiogram in the office.      All the best,    [facility]    All the best,    [facility]

## 2023-03-27 ENCOUNTER — TELEPHONE (OUTPATIENT)
Dept: CARDIOLOGY CLINIC | Age: 83
End: 2023-03-27

## 2023-03-27 NOTE — TELEPHONE ENCOUNTER
Your potassium and kidney functionare normal and this is great news. I hope all is well. Your pBNP is elevated and may indicate volume overload. If you are short of breath please let me know because in that case I would give you a diuretic lasix 20 mg to take for three days to see if you feel better. I also would like for you to have an echocardiogram in the office.      All the best,     Thersa Bras     All the best,     Thersa Bras

## 2023-04-04 NOTE — PROGRESS NOTES
CARDIOLOGY OFFICE NOTE    [address], [place], [address] [facility]  Phone [phone]; Fax [phone]    Primary Cardiologist: Jeffrey Granda. Cezar Andrew MD, [facility]  Last Office Visit: 9/30/22    Primary care: Jolanta Tobias DO Alf Lozano is a 79 y.o. male with  referred for atrial fibrillation, hypertension status post pacemaker followed by Dr. Stefanie Montes De Oca in the past.          Cardiac risk factors: sedentary life style, male gender, hypertension  I have personally obtained the history from the patient. HISTORY OF PRESENTING ILLNESS     Patient presents for follow-up. BNP was elevated at PCP office and repeat by Dr. Megha Bryant (was 3,526), added lasix 20 mg daily, only took lasix for 3 days with some relief. Also, has shortness of breath, lower extremity edema. Has upper thigh DVT on left upper leg. Was started on eliquis. Has an echo scheduled for 5/24/23 and we are trying to get echo moved up. Daughter is at visit and tells me that patient sleeps all the time and has a lot of fatigue. Has shortness of breath on exertion that has worsened. Also has bilateral lower extremity edema that has worsened. Denies chest pain, palpitations. From previous OV \"Mr. Alf Lozano  79 y.o. is a gentleman who was followed by Dr. Pritesh Perez in the past and then was followed by Dr. Stefanie Montes De Oca for pacemaker. He returns for follow-up since there is no electrophysiologist or  EP NP. His blood pressure is elevated today and they do not necessarily take it at the house. I asked that they do this in the meantime. He has no interval cardiac issues. No longer anticoagulated secondary to watchman device. His wife is very attentive and excellent and managing his care. She has a history of a bone marrow transplant 4 years ago for AML. \"     ACTIVE PROBLEM LIST     Patient Active Problem List    Diagnosis Date Noted    Atrial fibrillation ([facility] 08/16/2018    Sick sinus syndrome (HCC)     Presence of Watchman left atrial appendage closure device     Prostate carcinoma ([facility] 12/01/2021    Prediabetes 03/29/2019    Anticoagulant drug declined 07/16/2018    ACP (advance care planning) 07/13/2016    Pacemaker 11/02/2015    Essential hypertension 07/20/2015    Chronic atrial fibrillation ([facility] 07/20/2015           PAST MEDICAL HISTORY     Past Medical History:   Diagnosis Date    A-fib [facility])     discovered 2008    Arthritis     Knees    Hypertension     Pacemaker     placed  Nov 2011 ([address])    Sick sinus syndrome ([facility]     heart monitor showed long pauses (> 4 sec) in 2011           PAST SURGICAL HISTORY     Past Surgical History:   Procedure Laterality Date    HX HERNIA REPAIR      HX PACEMAKER            ALLERGIES     No Known Allergies       FAMILY HISTORY     Family History   Problem Relation Age of Onset    Diabetes Maternal Aunt     Hypertension Mother     Dementia Father     negative for cardiac disease       SOCIAL HISTORY     Social History     Socioeconomic History    Marital status:    Tobacco Use    Smoking status: Never    Smokeless tobacco: Never   Substance and Sexual Activity    Alcohol use: No     Alcohol/week: 0.0 standard drinks    Drug use: No     Social Determinants of Health     Financial Resource Strain: Low Risk     Difficulty of Paying Living Expenses: Not hard at all   Food Insecurity: No Food Insecurity    Worried About Running Out of Food in the Last Year: Never true    Ran Out of Food in the Last Year: Never true         MEDICATIONS     Current Outpatient Medications   Medication Sig    apixaban (ELIQUIS) 5 mg tablet Take 1 Tablet by mouth two (2) times a day. Nyamyc powder APPLY TO AFFECTED AREA(S) FOUR TIMES A DAY. USE UNTIL AT LEAST 24 HOURS AFTER RASH RESOLVED    apixaban (Eliquis DVT-PE Treat 30D Start) 5 mg (74 tabs) starter pack As directed    furosemide (LASIX) 20 mg tablet 1 tab PO Qam    amLODIPine (NORVASC) 10 mg tablet Take 1 Tablet by mouth daily. nebivoloL (BYSTOLIC) 20 mg tablet TAKE ONE TABLET BY MOUTH DAILY    cyanocobalamin 1,000 mcg tablet Take 1 Tablet by mouth daily. lycopene 10 mg cap Take  by mouth. cholecalciferol (VITAMIN D3) (1000 Units /25 mcg) tablet Take 1 Tablet by mouth daily. calcium carbonate (TUMS) 200 mg calcium (500 mg) chew Take 1 Tablet by mouth daily. pantoprazole (PROTONIX) 20 mg tablet Take 1 Tablet by mouth daily. multivitamin (ONE A DAY) tablet Take 1 Tablet by mouth daily. aspirin delayed-release 81 mg tablet Take 1 Tab by mouth daily. fish oil-omega-3 fatty acids 340-1,000 mg capsule Take 1 Capsule by mouth daily. saw palmetto 160 mg cap Take 160 mg by mouth three (3) times daily. potassium chloride (KLOR-CON M10) 10 mEq tablet Take 1 Tablet by mouth daily. (Patient not taking: Reported on 4/6/2023)     No current facility-administered medications for this visit. I have reviewed the nurses notes, vitals, problem list, allergy list, medical history, family, social history and medications. REVIEW OF SYMPTOMS    As per HPI  General: Pt denies excessive weight gain or loss. Pt is able to conduct ADL's  HEENT: Denies blurred vision, headaches, hearing loss, epistaxis and difficulty swallowing. Respiratory: Denies cough, congestion, shortness of breath, LOMELI, wheezing or stridor. Cardiovascular: Denies precordial pain, palpitations, edema or PND  Gastrointestinal: Denies poor appetite, indigestion, abdominal pain or blood in stool  Genitourinary: Denies hematuria, dysuria, increased urinary frequency  Musculoskeletal: Denies joint pain or swelling from muscles or joints  Neurologic: Denies tremor, paresthesias, headache, or sensory motor disturbance  Psychiatric: Denies confusion, insomnia, depression  Integumentray: Denies rash, itching or ulcers.   Hematologic: Denies easy bruising, bleeding     PHYSICAL EXAMINATION      Vitals:    04/06/23 1009   BP: 124/70   Pulse: (!) 59   SpO2: 97% Weight: 194 lb (88 kg)   Height: 6' 2\" (1.88 m)       General: Well developed, in no acute distress. HEENT: No jaundice, oral mucosa moist, no oral ulcers  Neck: Supple, no stiffness, no lymphadenopathy, supple  Heart: Regular rate and rhythm  Respiratory: Clear bilaterally x 4, no wheezing or rales  Extremities:  No edema, normal cap refill, no cyanosis. Musculoskeletal: No clubbing, no deformities  Neuro: A&Ox3, speech clear, gait stable, cooperative, no focal neurologic deficits  Skin: Skin color is normal. No rashes or lesions. Non diaphoretic, moist.  Abdomen:   Soft, non-tender, bowel sounds are active. EKG: Date: (9/3/2020 ventricular paced rhythm)     DIAGNOSTIC DATA     1. Pacer  12/11/[facility] Change and Lead Revision per Dr. Karthik John    2. Watchman  8/16/2018:Left atrial appendage occlusion utilizing WATCHMAN device per Dr. Karthik John    3. Stress Test  9/9/11-Stress Echo-normal, 10.1 mets, 8 min, EF 67%    4. Echo  9/9/11- EF 60%, CAMILA, mild MR/TR/PI, RVSP 42 mmHg  8/17/18-EF 55 % to 60 %, RVE, CAMILA, mild TR    5. Lipids  11/9/16- , HDL 62, , TG 69         LABORATORY DATA       Lab Results   Component Value Date/Time    WBC 5.9 06/29/2020 10:21 AM    HGB 15.6 06/29/2020 10:21 AM    HCT 48.9 06/29/2020 10:21 AM    PLATELET 160 42/02/2267 10:21 AM    MCV 98.6 06/29/2020 10:21 AM      Lab Results   Component Value Date/Time    Sodium 139 03/22/2023 11:01 AM    Potassium 4.3 03/22/2023 11:01 AM    Chloride 108 03/22/2023 11:01 AM    CO2 25 03/22/2023 11:01 AM    Anion gap 6 03/22/2023 11:01 AM    Glucose 132 (H) 03/22/2023 11:01 AM    BUN 30 (H) 03/22/2023 11:01 AM    Creatinine 1.03 03/22/2023 11:01 AM    BUN/Creatinine ratio 29 (H) 03/22/2023 11:01 AM    GFR est AA >60 05/12/2021 02:33 PM    GFR est non-AA >60 05/12/2021 02:33 PM    Calcium 8.9 03/22/2023 11:01 AM    Bilirubin, total 1.5 (H) 03/16/2023 10:00 AM    Alk.  phosphatase 96 03/16/2023 10:00 AM    Protein, total 7.1 03/16/2023 10:00 AM    Albumin 3.8 03/16/2023 10:00 AM    Globulin 3.3 03/16/2023 10:00 AM    A-G Ratio 1.2 03/16/2023 10:00 AM    ALT (SGPT) 21 03/16/2023 10:00 AM            ICD-10-CM ICD-9-CM   1. Chronic atrial fibrillation (HCC)  I48.20 427.31   2. Essential hypertension  I10 401.9   3. Presence of Watchman left atrial appendage closure device  Z95.818 V45.09   4. Cardiac pacemaker in situ  Z95.0 V45.01          ASSESSMENT/RECOMMENDATIONS:.      Shortness of breath  - elevated BNP 3,526 from 3/22/23   - only took lasix x 3 days - will ask patient to take lasix 40 mg x 3 days and reduce to 20 mg daily   - will recheck bmp  - echo scheduled end of may - will follow-up with Dr. Moi Ramirez at that time    2. DVT  - has left DVT in left leg  - treated with eliquis by PCP   - lower extremity edema - asked to wear compression stockings, elevate extremities and reduce salt intake    3. Dyslipidemia  -LDL was 89 from 10/6/22    4. PPM for SSS  -followed by our EP clinic    5. Atrial fibrillation  -He does have a watchman device in  -No issues with irregularity in rhythm    6. Hypertension  -BP ok in office and well controlled at home  - asked pt to reduce salt intake    Follow-up with Dr. Moi Ramirez in [facility] 2023. Radha Hand, NP    [id number], [address]  [place] [address]  [address]  [address]  Ph: [phone]   Ph [phone]

## 2023-04-06 ENCOUNTER — OFFICE VISIT (OUTPATIENT)
Dept: CARDIOLOGY CLINIC | Age: 83
End: 2023-04-06
Payer: MEDICARE

## 2023-04-06 PROCEDURE — 3074F SYST BP LT 130 MM HG: CPT

## 2023-04-06 PROCEDURE — 1123F ACP DISCUSS/DSCN MKR DOCD: CPT

## 2023-04-06 PROCEDURE — 93000 ELECTROCARDIOGRAM COMPLETE: CPT

## 2023-04-06 PROCEDURE — 99214 OFFICE O/P EST MOD 30 MIN: CPT

## 2023-04-06 PROCEDURE — 3078F DIAST BP <80 MM HG: CPT

## 2023-04-06 NOTE — PROGRESS NOTES
Oxana Self is a 79 y.o. male    Vitals:    04/06/23 1009   BP: 124/70   BP 1 Location: Left upper arm   BP Patient Position: Sitting   BP Cuff Size: Adult   Pulse: (!) 59   Height: 6' 2\" (1.88 m)   Weight: 194 lb (88 kg)   SpO2: 97%       Chief Complaint   Patient presents with    Irregular Heart Beat     AFIB       Chest pain NO  SOB NO  Dizziness NO  Swelling LEGS  Recent hospital visit NO  Refills NO  COVID VACCINE NO  HAD COVID?  YES

## 2023-04-06 NOTE — LETTER
4/6/2023    Patient: Alf Lozano   YOB: 1940   Date of Visit: 4/6/2023     Lola Chester[address]  [facility]    Dear Lola Chester DO,      Thank you for referring Mr. Mick Ariza to [facility] for evaluation. My notes for this consultation are attached. If you have questions, please do not hesitate to call me. I look forward to following your patient along with you.       Sincerely,    SAJAN Renee

## 2023-05-10 ENCOUNTER — TELEPHONE (OUTPATIENT)
Age: 83
End: 2023-05-10

## 2023-05-10 DIAGNOSIS — I10 HTN (HYPERTENSION): ICD-10-CM

## 2023-05-10 DIAGNOSIS — I48.20 CHRONIC ATRIAL FIBRILLATION, UNSPECIFIED (HCC): Primary | ICD-10-CM

## 2023-05-18 LAB
BUN SERPL-MCNC: 25 MG/DL (ref 8–27)
BUN/CREAT SERPL: 23 (ref 10–24)
CALCIUM SERPL-MCNC: 9.7 MG/DL (ref 8.6–10.2)
CHLORIDE SERPL-SCNC: 101 MMOL/L (ref 96–106)
CO2 SERPL-SCNC: 24 MMOL/L (ref 20–29)
CREAT SERPL-MCNC: 1.07 MG/DL (ref 0.76–1.27)
EGFRCR SERPLBLD CKD-EPI 2021: 68 ML/MIN/1.73
GLUCOSE SERPL-MCNC: 129 MG/DL (ref 70–99)
POTASSIUM SERPL-SCNC: 5.1 MMOL/L (ref 3.5–5.2)
SODIUM SERPL-SCNC: 141 MMOL/L (ref 134–144)

## 2023-05-18 NOTE — RESULT ENCOUNTER NOTE
Dear  Cody,    Your test results are stable. Please let me know if you have any questions.    Best Regards,  FIDE Patino NP

## 2023-05-20 RX ORDER — ASPIRIN 81 MG/1
81 TABLET ORAL DAILY
COMMUNITY
Start: 2018-10-01

## 2023-05-20 RX ORDER — FUROSEMIDE 20 MG/1
TABLET ORAL
COMMUNITY
Start: 2023-03-17 | End: 2023-05-30

## 2023-05-20 RX ORDER — NYSTATIN 100000 [USP'U]/G
POWDER TOPICAL
COMMUNITY
Start: 2023-03-21

## 2023-05-20 RX ORDER — AMLODIPINE BESYLATE 10 MG/1
10 TABLET ORAL DAILY
COMMUNITY
Start: 2023-02-27 | End: 2023-06-23

## 2023-05-20 RX ORDER — UREA 10 %
200 LOTION (ML) TOPICAL DAILY
COMMUNITY

## 2023-05-20 RX ORDER — NEBIVOLOL 20 MG/1
1 TABLET ORAL DAILY
COMMUNITY
Start: 2023-02-02

## 2023-05-20 RX ORDER — PANTOPRAZOLE SODIUM 20 MG/1
20 TABLET, DELAYED RELEASE ORAL DAILY
COMMUNITY
Start: 2022-05-11 | End: 2023-06-21

## 2023-05-30 RX ORDER — FUROSEMIDE 20 MG/1
TABLET ORAL
Qty: 30 TABLET | Refills: 1 | Status: SHIPPED | OUTPATIENT
Start: 2023-05-30

## 2023-06-09 ENCOUNTER — TELEPHONE (OUTPATIENT)
Age: 83
End: 2023-06-09

## 2023-06-15 ENCOUNTER — NURSE ONLY (OUTPATIENT)
Age: 83
End: 2023-06-15

## 2023-06-15 DIAGNOSIS — Z95.0 CARDIAC PACEMAKER IN SITU: ICD-10-CM

## 2023-06-15 DIAGNOSIS — Z95.818 PRESENCE OF OTHER CARDIAC IMPLANTS AND GRAFTS: Primary | ICD-10-CM

## 2023-06-21 RX ORDER — PANTOPRAZOLE SODIUM 20 MG/1
TABLET, DELAYED RELEASE ORAL
Qty: 60 TABLET | Refills: 0 | Status: SHIPPED | OUTPATIENT
Start: 2023-06-21

## 2023-06-23 ENCOUNTER — OFFICE VISIT (OUTPATIENT)
Age: 83
End: 2023-06-23
Payer: COMMERCIAL

## 2023-06-23 VITALS
DIASTOLIC BLOOD PRESSURE: 80 MMHG | HEIGHT: 74 IN | BODY MASS INDEX: 24 KG/M2 | HEART RATE: 60 BPM | SYSTOLIC BLOOD PRESSURE: 126 MMHG | WEIGHT: 187 LBS | OXYGEN SATURATION: 98 %

## 2023-06-23 DIAGNOSIS — R06.09 DOE (DYSPNEA ON EXERTION): ICD-10-CM

## 2023-06-23 DIAGNOSIS — I48.91 ATRIAL FIBRILLATION, UNSPECIFIED TYPE (HCC): ICD-10-CM

## 2023-06-23 DIAGNOSIS — Z95.0 CARDIAC PACEMAKER IN SITU: Primary | ICD-10-CM

## 2023-06-23 DIAGNOSIS — I10 HYPERTENSION, UNSPECIFIED TYPE: ICD-10-CM

## 2023-06-23 PROCEDURE — 3074F SYST BP LT 130 MM HG: CPT | Performed by: SPECIALIST

## 2023-06-23 PROCEDURE — 1123F ACP DISCUSS/DSCN MKR DOCD: CPT | Performed by: SPECIALIST

## 2023-06-23 PROCEDURE — 3079F DIAST BP 80-89 MM HG: CPT | Performed by: SPECIALIST

## 2023-06-23 PROCEDURE — 99214 OFFICE O/P EST MOD 30 MIN: CPT | Performed by: SPECIALIST

## 2023-06-23 RX ORDER — VALSARTAN 160 MG/1
160 TABLET ORAL DAILY
Qty: 30 TABLET | Refills: 5 | Status: SHIPPED | OUTPATIENT
Start: 2023-06-23

## 2023-06-23 NOTE — PATIENT INSTRUCTIONS
1) I think there is a possibility that the lower extremity swelling you are having may be coming from your amlodipine also known as Norvasc. I would like for you to stop this medicine but hold onto it and put another medicine    2) I would like for you to start Diovan which is a angiotensin receptor binder for blood pressure. You will take this is 160 mg a day and follow your blood pressures closely at home    3) I would like you to switch your Lasix to every other day temporarily and weigh yourself daily. You should weigh yourself daily in the morning and if you see a 5 to 6 pound weight gain over 2-day. I like for you to take an extra 20 mg of your Lasix. 4) follow-up with me in approximately 6 to 8 weeks. It is  my pleasure to have the opportunity to be involved in taking care of you and to provide you the best cardiac care. At the end of every visit I  encourage you to eat healthy and clean and reduce your red meat intake as well as exercise 30 minutes 5 days a week to ensure a healthy heart. If you are a smoker , it will be essential that you stop smoking to reduce your risk of heart disease. Part of providing you the best heart care possible IS AN ACCURATE KNOWLEDGE OF YOUR MEDICINE. It  will be  essential at each office visit that you provide me with an accurate list of your medicines. When you come into the office you should have that list or another option is lining up your pill bottles  and taking a snapshot with your cell phone of all the medicines you are taking currently and show it to the nurse in the examining room. Inaccurate reporting of your medication to the nurse may lead to adverse events and medical errors. Thank you again for giving me the opportunity to be part of your heart care and it is my pleasure. All the best,    Saurav Carroll MD

## 2023-06-23 NOTE — PROGRESS NOTES
Chief Complaint   Patient presents with    Irregular Heart Beat     AFIB    Hypertension     Vitals:    23 1349   BP: 126/80   Site: Left Upper Arm   Position: Sitting   Cuff Size: Medium Adult   Pulse: 60   SpO2: 98%   Weight: 187 lb (84.8 kg)   Height: 6' 2\" (1.88 m)      /80 (Site: Left Upper Arm, Position: Sitting, Cuff Size: Medium Adult)   Pulse 60   Ht 6' 2\" (1.88 m)   Wt 187 lb (84.8 kg)   SpO2 98%   BMI 24.01 kg/m²      Chest pain             NO  SOB                       YES  Swelling                 LEGS AND FEET  Dizziness               NO  Recent hospital     NO  Refills                    NO         Covid vaccination  YES  Covid                     YES  NAME Crissy Chahal         1940      MRN    [id number]      LAST OFFICE APPOINTMENT: 2023     DIAGNOSIS    ICD-10-CM    1. Cardiac pacemaker in situ  Z95.0       2. Atrial fibrillation, unspecified type ([facility]  I48.91       3. Hypertension, unspecified type  I10       4. GLASS (dyspnea on exertion)  R06.09           HOME MEDICATION  Current Outpatient Medications   Medication Sig    pantoprazole (PROTONIX) 20 MG tablet TAKE ONE TABLET BY MOUTH DAILY    apixaban (ELIQUIS) 5 MG TABS tablet Take 1 tablet by mouth 2 times daily    furosemide (LASIX) 20 MG tablet TAKE ONE TABLET BY MOUTH EVERY MORNING    amLODIPine (NORVASC) 10 MG tablet Take 1 tablet by mouth daily    aspirin 81 MG EC tablet Take 1 tablet by mouth daily    calcium carbonate (OS-TENISHA) 1250 (500 Ca) MG chewable tablet Take 200 mg by mouth daily    vitamin D (CHOLECALCIFEROL) 25 MCG (1000 UT) TABS tablet Take 1 tablet by mouth daily    cyanocobalamin 1000 MCG tablet Take 1 tablet by mouth daily    Lycopene 10 MG CAPS Take by mouth    nebivolol (BYSTOLIC) 20 MG TABS tablet Take 1 tablet by mouth daily    nystatin (MYCOSTATIN) 781810 UNIT/GM powder APPLY TO AFFECTED AREA(S) FOUR TIMES A DAY.  USE UNTIL AT LEAST 24 HOURS AFTER RASH RESOLVED     No current

## 2023-06-23 NOTE — PROGRESS NOTES
CARDIOLOGY OFFICE NOTE    Saurav Cooley MD, [address], [place], [address] [facility]  Phone [phone]; Fax [phone]  Mobile [phone]   Voice Mail [phone]    Primary care: Jailene Haas DO       ATTENTION:   This medical record was transcribed using an electronic medical records/speech recognition system. Although proofread, it may and can contain electronic, spelling and other errors. Corrections may be executed at a later time. Please feel free to contact us for any clarifications as needed. Crissy Chahal is a 79 y.o. male with  referred for atrial fibrillation, hypertension status post pacemaker followed by Dr. Renee Simpson in the past.            Cardiac risk factors: sedentary life style, male gender, hypertension   I have personally obtained the history from the patient. HISTORY OF PRESENTING ILLNESS    Ms./Mr. Crissy Chahal  79 y.o. is very nice gentleman who comes in with his wife today. He has a history of atrial fibrillation, hypertension as well as lower extremity edema with elevated BNP of 3000 approximately. He is on Lasix at 20 mg a day and also on amlodipine I think his amlodipine may be contributing to his edema. His legs are better his wife states. He is being followed by EP as well for follow-up of his pacemaker. He had this checked recently.          ACTIVE PROBLEM LIST     Patient Active Problem List    Diagnosis Date Noted    Atrial fibrillation ([address] 08/16/2018    Sick sinus syndrome ([address]     Presence of Watchman left atrial appendage closure device     Prostate carcinoma ([address] 12/01/2021    Prediabetes 03/29/2019    Anticoagulant drug declined 07/16/2018    ACP (advance care planning) 07/13/2016    Pacemaker 11/02/2015    Chronic atrial fibrillation ([address] 07/20/2015    Essential hypertension 07/20/2015           PAST MEDICAL HISTORY     Past Medical History:   Diagnosis Date    A-fib ([address]

## 2023-06-26 RX ORDER — APIXABAN 5 MG/1
TABLET, FILM COATED ORAL
Qty: 60 TABLET | Refills: 5 | Status: SHIPPED | OUTPATIENT
Start: 2023-06-26

## 2023-07-10 SDOH — ECONOMIC STABILITY: INCOME INSECURITY: HOW HARD IS IT FOR YOU TO PAY FOR THE VERY BASICS LIKE FOOD, HOUSING, MEDICAL CARE, AND HEATING?: NOT HARD AT ALL

## 2023-07-10 SDOH — ECONOMIC STABILITY: FOOD INSECURITY: WITHIN THE PAST 12 MONTHS, YOU WORRIED THAT YOUR FOOD WOULD RUN OUT BEFORE YOU GOT MONEY TO BUY MORE.: NEVER TRUE

## 2023-07-10 SDOH — ECONOMIC STABILITY: TRANSPORTATION INSECURITY
IN THE PAST 12 MONTHS, HAS LACK OF TRANSPORTATION KEPT YOU FROM MEETINGS, WORK, OR FROM GETTING THINGS NEEDED FOR DAILY LIVING?: NO

## 2023-07-10 SDOH — ECONOMIC STABILITY: HOUSING INSECURITY
IN THE LAST 12 MONTHS, WAS THERE A TIME WHEN YOU DID NOT HAVE A STEADY PLACE TO SLEEP OR SLEPT IN A SHELTER (INCLUDING NOW)?: NO

## 2023-07-10 SDOH — ECONOMIC STABILITY: FOOD INSECURITY: WITHIN THE PAST 12 MONTHS, THE FOOD YOU BOUGHT JUST DIDN'T LAST AND YOU DIDN'T HAVE MONEY TO GET MORE.: NEVER TRUE

## 2023-07-11 RX ORDER — POTASSIUM CHLORIDE 750 MG/1
TABLET, EXTENDED RELEASE ORAL
Qty: 30 TABLET | Refills: 0 | Status: SHIPPED | OUTPATIENT
Start: 2023-07-11

## 2023-07-13 ENCOUNTER — OFFICE VISIT (OUTPATIENT)
Age: 83
End: 2023-07-13
Payer: COMMERCIAL

## 2023-07-13 ENCOUNTER — CLINICAL DOCUMENTATION (OUTPATIENT)
Age: 83
End: 2023-07-13

## 2023-07-13 VITALS
HEIGHT: 74 IN | RESPIRATION RATE: 16 BRPM | WEIGHT: 179 LBS | OXYGEN SATURATION: 96 % | SYSTOLIC BLOOD PRESSURE: 142 MMHG | TEMPERATURE: 97.4 F | HEART RATE: 59 BPM | DIASTOLIC BLOOD PRESSURE: 75 MMHG | BODY MASS INDEX: 22.97 KG/M2

## 2023-07-13 DIAGNOSIS — Z51.81 MEDICATION MONITORING ENCOUNTER: ICD-10-CM

## 2023-07-13 DIAGNOSIS — E11.9 TYPE 2 DIABETES MELLITUS WITHOUT COMPLICATION, WITHOUT LONG-TERM CURRENT USE OF INSULIN (HCC): ICD-10-CM

## 2023-07-13 DIAGNOSIS — R41.3 MEMORY LOSS: Primary | ICD-10-CM

## 2023-07-13 DIAGNOSIS — I50.9 CHRONIC CONGESTIVE HEART FAILURE, UNSPECIFIED HEART FAILURE TYPE (HCC): ICD-10-CM

## 2023-07-13 LAB
ANION GAP SERPL CALC-SCNC: 6 MMOL/L (ref 5–15)
BUN SERPL-MCNC: 26 MG/DL (ref 6–20)
BUN/CREAT SERPL: 22 (ref 12–20)
CALCIUM SERPL-MCNC: 9.3 MG/DL (ref 8.5–10.1)
CHLORIDE SERPL-SCNC: 104 MMOL/L (ref 97–108)
CO2 SERPL-SCNC: 29 MMOL/L (ref 21–32)
CREAT SERPL-MCNC: 1.19 MG/DL (ref 0.7–1.3)
GLUCOSE SERPL-MCNC: 116 MG/DL (ref 65–100)
POTASSIUM SERPL-SCNC: 4.2 MMOL/L (ref 3.5–5.1)
SODIUM SERPL-SCNC: 139 MMOL/L (ref 136–145)
TSH SERPL DL<=0.05 MIU/L-ACNC: 1.76 UIU/ML (ref 0.36–3.74)
VIT B12 SERPL-MCNC: 716 PG/ML (ref 193–986)

## 2023-07-13 PROCEDURE — 3078F DIAST BP <80 MM HG: CPT | Performed by: FAMILY MEDICINE

## 2023-07-13 PROCEDURE — 1123F ACP DISCUSS/DSCN MKR DOCD: CPT | Performed by: FAMILY MEDICINE

## 2023-07-13 PROCEDURE — 3044F HG A1C LEVEL LT 7.0%: CPT | Performed by: FAMILY MEDICINE

## 2023-07-13 PROCEDURE — 99214 OFFICE O/P EST MOD 30 MIN: CPT | Performed by: FAMILY MEDICINE

## 2023-07-13 PROCEDURE — 3077F SYST BP >= 140 MM HG: CPT | Performed by: FAMILY MEDICINE

## 2023-07-13 RX ORDER — LUTEIN 6 MG
TABLET ORAL
COMMUNITY

## 2023-07-13 RX ORDER — AMOXICILLIN 500 MG
CAPSULE ORAL
COMMUNITY

## 2023-07-13 ASSESSMENT — PATIENT HEALTH QUESTIONNAIRE - PHQ9
SUM OF ALL RESPONSES TO PHQ QUESTIONS 1-9: 0
SUM OF ALL RESPONSES TO PHQ9 QUESTIONS 1 & 2: 0
1. LITTLE INTEREST OR PLEASURE IN DOING THINGS: 0
SUM OF ALL RESPONSES TO PHQ QUESTIONS 1-9: 0
2. FEELING DOWN, DEPRESSED OR HOPELESS: 0
SUM OF ALL RESPONSES TO PHQ QUESTIONS 1-9: 0
SUM OF ALL RESPONSES TO PHQ QUESTIONS 1-9: 0

## 2023-07-13 NOTE — PROGRESS NOTES
Chief Complaint   Patient presents with    Follow-up     Patient presents in office today for 3 month f/u. No concerns. 1. \"Have you been to the ER, urgent care clinic since your last visit? Hospitalized since your last visit? \" No    2. \"Have you seen or consulted any other health care providers outside of the [address] [place] [address] since your last visit? \" No     3. For patients aged 43-73: Has the patient had a colonoscopy / FIT/ Cologuard? NA - based on age      If the patient is female:    4. For patients aged 43-66: Has the patient had a mammogram within the past 2 years? NA - based on age or sex      11. For patients aged 21-65: Has the patient had a pap smear?  NA - based on age or sex

## 2023-07-14 ENCOUNTER — TELEPHONE (OUTPATIENT)
Age: 83
End: 2023-07-14

## 2023-07-14 LAB
EST. AVERAGE GLUCOSE BLD GHB EST-MCNC: 126 MG/DL
HBA1C MFR BLD: 6 % (ref 4–5.6)

## 2023-07-18 ENCOUNTER — TELEPHONE (OUTPATIENT)
Age: 83
End: 2023-07-18

## 2023-07-18 RX ORDER — PANTOPRAZOLE SODIUM 20 MG/1
20 TABLET, DELAYED RELEASE ORAL DAILY
Qty: 90 TABLET | Refills: 3 | Status: SHIPPED | OUTPATIENT
Start: 2023-07-18

## 2023-07-18 NOTE — TELEPHONE ENCOUNTER
We received a fax refill request for Yony Jose. Please escribe Pantoprazole [address] to their pharmacy. The pharmacy is correct in the chart and they are requesting a ? day supply.

## 2023-08-07 ENCOUNTER — OFFICE VISIT (OUTPATIENT)
Age: 83
End: 2023-08-07
Payer: COMMERCIAL

## 2023-08-07 VITALS — HEIGHT: 70 IN | RESPIRATION RATE: 18 BRPM | OXYGEN SATURATION: 97 % | BODY MASS INDEX: 25.68 KG/M2

## 2023-08-07 DIAGNOSIS — F03.B11 MODERATE DEMENTIA WITH AGITATION, UNSPECIFIED DEMENTIA TYPE (HCC): Primary | ICD-10-CM

## 2023-08-07 PROCEDURE — 99204 OFFICE O/P NEW MOD 45 MIN: CPT | Performed by: PSYCHIATRY & NEUROLOGY

## 2023-08-07 PROCEDURE — 1123F ACP DISCUSS/DSCN MKR DOCD: CPT | Performed by: PSYCHIATRY & NEUROLOGY

## 2023-08-07 RX ORDER — DONEPEZIL HYDROCHLORIDE 10 MG/1
10 TABLET, FILM COATED ORAL DAILY
Qty: 90 TABLET | Refills: 3 | Status: SHIPPED | OUTPATIENT
Start: 2023-08-07

## 2023-08-07 RX ORDER — DONEPEZIL HYDROCHLORIDE 5 MG/1
5 TABLET, FILM COATED ORAL NIGHTLY
Qty: 30 TABLET | Refills: 0 | Status: SHIPPED | OUTPATIENT
Start: 2023-08-07 | End: 2023-09-06

## 2023-08-07 ASSESSMENT — PATIENT HEALTH QUESTIONNAIRE - PHQ9
2. FEELING DOWN, DEPRESSED OR HOPELESS: 0
SUM OF ALL RESPONSES TO PHQ QUESTIONS 1-9: 0
SUM OF ALL RESPONSES TO PHQ9 QUESTIONS 1 & 2: 0
1. LITTLE INTEREST OR PLEASURE IN DOING THINGS: 0
SUM OF ALL RESPONSES TO PHQ QUESTIONS 1-9: 0

## 2023-08-07 NOTE — PATIENT INSTRUCTIONS
[address] [place] [address] Neuroscience Test Result Communication    Test results are available in [employer]. [employer] is the patient portal into our electronic health record. This feature allows patients to see diagnostic test results, immunizations, allergies, past medical and surgical history, current medications, and send messages directly to providers. Our team members at the  can provide additional information and assist with registration. The [employer] support team can be reached at [phone]. In some cases, a provider might need time to explain the results in detail during a follow-up appointment. This might include additional information or context that will help patients understand the reason for next steps in the plan of care recommended by their provider. If a patient chooses to receive diagnostic testing at an imaging center outside of the [facility], it is the patient's responsibility to bring the imaging report and disc to their [address] [place] [address] follow-up appointment. If the test results reveal anything that is particularly noteworthy, we will contact you to discuss the matter and, if necessary, schedule a follow-up appointment at an earlier date. If you have not received your test results by [employer] or other communication within 7 days, please contact our office. An inquiry can be sent to your provider using [employer]. Alternatively, appointments can be scheduled via telephone to review results. If a follow-up appointment to review results has not been scheduled, [facility] office can be reached at [phone]. For appointments at our [facility] or [facility] office, please call [phone]. [address] [facility]   Statement to Patients  April 1, 2014      In an effort to ensure the large volume of patient prescription refills is processed in the most efficient and expeditious

## 2023-08-07 NOTE — PROGRESS NOTES
2014 [facility]  Neurology Clinics and [address] [place] at [facility] Neurology Clinics at [address], [address] [place] [address] Drive  [phone] Office  [phone] Facsimile           Referring: Latoya Neal DO      Chief Complaint   Patient presents with    New Patient     Patient is here for possible dementia. He is here today with his wife. They report that he is having memory problems. Mrs. Didier Garcia stated that he use to take a shower every night now she has to make him take one. He seems to be having behavioral problems. 79-year-old gentleman who presents today accompanied by his wife for initial neurologic consultation regarding progressive memory difficulty. Both provide history. He tells me that he just thinks he has trouble remembering certain things. He is unable to elaborate further. He according to his wife has had progressive cognitive decline over the last year and a half. He forgets conversations. He repeats. She had to take over the finances. She notes that there are times where he gets quite agitated. He can awaken from a nap being very angry. No family history of dementia. Office visit with Dr. Dev Castellano dated July 13, 2023 where he was presenting as an evaluation regarding having his license reinstated. He has not driven since last November. Patient's wife noted he was having memory issues. No car accidents. Daughter was concerned about his driving. He failed the MMSE as well as the clock drawing. He was sent to [facility] to require a skill and knowledge test prior to licensing. He was referred to Dr. Conor Hernandez for neuropsychological evaluation.   Laboratory analysis was undertaken    Laboratory analysis July 13, 2023  TSH normal  C96 normal  Metabolic panel unremarkable  A1c 6    No cranial imaging  Past Medical History:   Diagnosis Date    A-fib [facility])     discovered 2008    Arthritis

## 2023-08-08 ENCOUNTER — TELEPHONE (OUTPATIENT)
Age: 83
End: 2023-08-08

## 2023-08-08 NOTE — TELEPHONE ENCOUNTER
Patients daughter stated she spoke to [facility] [employer] and they stated patient needa authorization for EEG. Sheridan Shrestha would like to confirm if authorization was sent. EEG is scheduled for tomorrow 8/9/23 at 8:45a.     Please contact

## 2023-08-09 ENCOUNTER — PROCEDURE VISIT (OUTPATIENT)
Age: 83
End: 2023-08-09

## 2023-08-09 DIAGNOSIS — F03.B11 MODERATE DEMENTIA WITH AGITATION, UNSPECIFIED DEMENTIA TYPE (HCC): Primary | ICD-10-CM

## 2023-08-12 NOTE — PROCEDURES
[facility] AT [place]   Electroencephalogram Report    Procedure ID: [id number] Procedure Date: 8/9/2023   Patient Name: Jaswant Ireland YOB: 1940   Procedure Type: Routine Medical Record No: [id number]       An EEG is requested in this 79-year-old man to evaluate for epileptiform abnormality. Medication said to include Aricept, Eliquis, Protonix, Diovan, Lasix, Bystolic    This tracing is obtained during the awake state. During wakefulness there are intermittent runs of posteriorly dominant and symmetric low to medium amplitude 8 cps activities which attenuate with eye opening. Lower voltage faster frequency activities are seen symmetrically over the anterior head regions. Hyperventilation is not performed. Intermittent photic stimulation is not performed. Sleep is not attained.     Interpretation  This EEG recorded during the awake state is normal.        Kristina Atkins MD

## 2023-08-15 RX ORDER — POTASSIUM CHLORIDE 750 MG/1
TABLET, EXTENDED RELEASE ORAL
Qty: 90 TABLET | Refills: 3 | Status: SHIPPED | OUTPATIENT
Start: 2023-08-15

## 2023-08-15 RX ORDER — FUROSEMIDE 20 MG/1
TABLET ORAL
Qty: 90 TABLET | Refills: 3 | Status: SHIPPED | OUTPATIENT
Start: 2023-08-15

## 2023-08-21 ENCOUNTER — OFFICE VISIT (OUTPATIENT)
Age: 83
End: 2023-08-21
Payer: COMMERCIAL

## 2023-08-21 VITALS
BODY MASS INDEX: 23.54 KG/M2 | SYSTOLIC BLOOD PRESSURE: 134 MMHG | OXYGEN SATURATION: 94 % | HEART RATE: 60 BPM | DIASTOLIC BLOOD PRESSURE: 78 MMHG | HEIGHT: 74 IN | WEIGHT: 183.4 LBS

## 2023-08-21 DIAGNOSIS — I48.91 ATRIAL FIBRILLATION, UNSPECIFIED TYPE (HCC): ICD-10-CM

## 2023-08-21 DIAGNOSIS — Z95.0 CARDIAC PACEMAKER IN SITU: Primary | ICD-10-CM

## 2023-08-21 DIAGNOSIS — D68.69 SECONDARY HYPERCOAGULABLE STATE (HCC): ICD-10-CM

## 2023-08-21 DIAGNOSIS — I10 HYPERTENSION, UNSPECIFIED TYPE: ICD-10-CM

## 2023-08-21 DIAGNOSIS — R06.09 DOE (DYSPNEA ON EXERTION): ICD-10-CM

## 2023-08-21 PROCEDURE — 3078F DIAST BP <80 MM HG: CPT | Performed by: SPECIALIST

## 2023-08-21 PROCEDURE — 1123F ACP DISCUSS/DSCN MKR DOCD: CPT | Performed by: SPECIALIST

## 2023-08-21 PROCEDURE — 3075F SYST BP GE 130 - 139MM HG: CPT | Performed by: SPECIALIST

## 2023-08-21 PROCEDURE — 99214 OFFICE O/P EST MOD 30 MIN: CPT | Performed by: SPECIALIST

## 2023-08-21 NOTE — PROGRESS NOTES
Vanna Carrasquillo is a 79 y.o. male    had concerns including Atrial Fibrillation and Hypertension. Vitals:    23 1211   BP: 134/78   Site: Left Upper Arm   Position: Sitting   Pulse: 60   SpO2: 94%   Weight: 183 lb 6.4 oz (83.2 kg)   Height: 6' 2\" (1.88 m)        Chest pain No    SOB at times     Dizziness No    Swelling  some in his feet;right lower leg wound    Refills No    Covid Vaccinated No      1. Have you been to the ER, urgent care clinic since your last visit? Hospitalized since your last visit? no    2. Have you seen or consulted any other health care providers outside of the [address] [place] [address] Avenue since your last visit? Include any pap smears or colon screening. No    NAME Vanna Carrasquillo         1940      MRN    [id number]      LAST OFFICE APPOINTMENT: 2023     DIAGNOSIS  No diagnosis found. HOME MEDICATION  Current Outpatient Medications   Medication Sig    potassium chloride (KLOR-CON M) 10 MEQ extended release tablet TAKE 1 TABLET BY MOUTH DAILY    furosemide (LASIX) 20 MG tablet TAKE ONE TABLET BY MOUTH EVERY MORNING    donepezil (ARICEPT) 5 MG tablet Take 1 tablet by mouth nightly When this bottle is empty start 10mg size.   Do not refill this Rx    donepezil (ARICEPT) 10 MG tablet Take 1 tablet by mouth daily    pantoprazole (PROTONIX) 20 MG tablet Take 1 tablet by mouth daily    Omega-3 Fatty Acids (FISH OIL) 1200 MG CAPS Take by mouth    Lutein 20 MG TABS Take by mouth    apixaban (ELIQUIS) 5 MG TABS tablet TAKE 1 TABLET BY MOUTH TWICE A DAY    valsartan (DIOVAN) 160 MG tablet Take 1 tablet by mouth daily    aspirin 81 MG EC tablet Take 1 tablet by mouth daily    vitamin D (CHOLECALCIFEROL) 25 MCG (1000 UT) TABS tablet Take 1 tablet by mouth daily    cyanocobalamin 1000 MCG tablet Take 1 tablet by mouth daily    Lycopene 10 MG CAPS Take by mouth    nebivolol (BYSTOLIC) 20 MG TABS tablet Take 1 tablet by mouth daily    calcium carbonate (OS-TENISHA) 1250 (500 Ca) MG

## 2023-08-21 NOTE — PROGRESS NOTES
CARDIOLOGY OFFICE NOTE    Saurav Perez MD, [facility] [address], [place]  Phone [phone]; Fax [phone]  Mobile [phone]   Voice Mail [phone]    Primary care: Bisi Sneed DO       ATTENTION:   This medical record was transcribed using an electronic medical records/speech recognition system. Although proofread, it may and can contain electronic, spelling and other errors. Corrections may be executed at a later time. Please feel free to contact us for any clarifications as needed. Monica Rao is a 79 y.o. male with  referred for atrial fibrillation, hypertension status post pacemaker followed by Dr. Ramesh Moraels in the past.            Cardiac risk factors: sedentary life style, male gender, hypertension   I have personally obtained the history from the patient. HISTORY OF PRESENTING ILLNESS    Ms./Mr. Monica Rao  79 y.o. comes to the office today with his wife to ensure that his lower extremity and edema improved off amlodipine and his blood pressure was adequate on the Diovan. He is doing well with no interval cardiac complaints. Swelling his legs has improved. He did bump his leg up against something in his right leg and they took the bandage off Josiephine [place] had a layer of skin removed and its red underneath and I told them that they want him seen in wound clinic I be more than happy to arrange this. His wife states it would only mean 1 more office visit and she will call me should the appearance of the wound cause any concern.        ACTIVE PROBLEM LIST     Patient Active Problem List    Diagnosis Date Noted    Atrial fibrillation ([address]) 08/16/2018    Secondary hypercoagulable state ([address]) 08/21/2023    Type 2 diabetes mellitus without complication, without long-term current use of insulin ([address]) 07/13/2023    Sick sinus syndrome (HCC)     Presence of Watchman left atrial appendage closure device

## 2023-08-21 NOTE — PATIENT INSTRUCTIONS

## 2023-08-30 ENCOUNTER — HOSPITAL ENCOUNTER (OUTPATIENT)
Facility: HOSPITAL | Age: 83
Discharge: HOME | End: 2023-09-02
Payer: MEDICARE

## 2023-08-30 ENCOUNTER — HOSPITAL ENCOUNTER (OUTPATIENT)
Facility: HOSPITAL | Age: 83
Discharge: HOME | End: 2023-09-01
Payer: MEDICARE

## 2023-08-30 DIAGNOSIS — F03.B11 MODERATE DEMENTIA WITH AGITATION, UNSPECIFIED DEMENTIA TYPE (HCC): ICD-10-CM

## 2023-08-30 PROCEDURE — 93880 EXTRACRANIAL BILAT STUDY: CPT | Performed by: SURGERY

## 2023-08-30 PROCEDURE — 93880 EXTRACRANIAL BILAT STUDY: CPT

## 2023-08-30 PROCEDURE — 70450 CT HEAD/BRAIN W/O DYE: CPT

## 2023-08-31 PROBLEM — F03.B11 MODERATE DEMENTIA WITH AGITATION (HCC): Status: ACTIVE | Noted: 2023-08-31

## 2023-08-31 LAB
VAS LEFT CCA DIST EDV: 10.7 CM/S
VAS LEFT CCA DIST PSV: 70.3 CM/S
VAS LEFT CCA PROX EDV: 13 CM/S
VAS LEFT CCA PROX PSV: 91.7 CM/S
VAS LEFT ECA EDV: 15.3 CM/S
VAS LEFT ECA PSV: 122 CM/S
VAS LEFT ICA DIST EDV: 9.9 CM/S
VAS LEFT ICA DIST PSV: 67.2 CM/S
VAS LEFT ICA PROX EDV: 13 CM/S
VAS LEFT ICA PROX PSV: 108 CM/S
VAS LEFT ICA/CCA PSV: 1.54
VAS LEFT SUBCLAVIAN PROX EDV: 0 CM/S
VAS LEFT SUBCLAVIAN PROX PSV: 75.6 CM/S
VAS LEFT VERTEBRAL EDV: 1.53 CM/S
VAS LEFT VERTEBRAL PSV: 44.3 CM/S
VAS RIGHT CCA DIST EDV: 9.9 CM/S
VAS RIGHT CCA DIST PSV: 73.3 CM/S
VAS RIGHT CCA PROX EDV: 9.9 CM/S
VAS RIGHT CCA PROX PSV: 82.5 CM/S
VAS RIGHT ECA EDV: 0 CM/S
VAS RIGHT ECA PSV: 105 CM/S
VAS RIGHT ICA DIST EDV: 30.7 CM/S
VAS RIGHT ICA DIST PSV: 113 CM/S
VAS RIGHT ICA PROX EDV: 15.3 CM/S
VAS RIGHT ICA PROX PSV: 66.5 CM/S
VAS RIGHT ICA/CCA PSV: 0.91
VAS RIGHT SUBCLAVIAN PROX EDV: 0 CM/S
VAS RIGHT SUBCLAVIAN PROX PSV: 51.9 CM/S
VAS RIGHT VERTEBRAL EDV: 12.2 CM/S
VAS RIGHT VERTEBRAL PSV: 90.1 CM/S

## 2023-09-07 RX ORDER — DONEPEZIL HYDROCHLORIDE 5 MG/1
TABLET, FILM COATED ORAL
Qty: 30 TABLET | Refills: 0 | OUTPATIENT
Start: 2023-09-07

## 2023-09-22 ENCOUNTER — TELEPHONE (OUTPATIENT)
Age: 83
End: 2023-09-22

## 2023-09-22 NOTE — TELEPHONE ENCOUNTER
Janeth Seaman Eddicintia/patient's daughter states that patient seems to be chocking after eating. She seems to think it is acid and would like to discuss something that may help.  Ms Lowe's phone: [phone]

## 2023-09-22 NOTE — TELEPHONE ENCOUNTER
Called and spoke with David Horton. Advised to try Pepcid or Prilosec OTC. She states that they have started trying to give him Pepcid. Advised if no improvement with trying the Pepcid to have him see either Dr. Sangeeta Garvin or GI. He does currently have an apt scheduled for 10/18/23. She states that they will just f/u at that time.

## 2023-10-17 NOTE — PROGRESS NOTES
Intake Note      Patient Name: Susan Escobar  YOB: 1940    Age: 79 y.o. Date of Intake: 10/18/2023   Education: 12 Ethnicity White   Gender: Male Referring Provider: Dr. Brady Else:  Susan Escobar  was referred for evaluation by his Primary Care Physician to assist in differential diagnosis and individualized treatment planning. he understood the rationale and procedures for evaluation, as well as the limits to confidentiality, and agreed to participate. he consented to have this report made available to his  treating providers through his  electronic medical records. History Sources: Patient, Spouse, Relative (granddaughter), and Medical Record    HISTORY OF PRESENT ILLNESS:  The patient is an 79-year-old male with pertinent medical history noted for pacemaker, prediabetes, atrial fibrillation, essential hypertension, prostate cancer, type 2 diabetes mellitus, and moderate dementia with agitation. The patient presented for clinical interview accompanied by his wife and granddaughter who assisted with establishing history. According to collateral sources, the patient has experienced insidious onset of gradually progressive declines in his cognitive functioning. They describe his cognitive decline to include short-term episodic memory deficits such as forgetting recent conversations/events and repeating questions. They also reported executive dysfunction, such as difficulties making basic decisions (e.g., why he should wear). Pertaining to the patient's emotional functioning, the patient has reportedly been more irritable and easily agitated. This is in contrast to a relatively unremarkable psychiatric history. The patient has never been treated for clinically significant psychiatric symptomatology.   His wife also reported, and the patient displayed, evidence of other neuropsychiatric symptoms, including delusional believes that his wife

## 2023-10-18 ENCOUNTER — TELEPHONE (OUTPATIENT)
Age: 83
End: 2023-10-18

## 2023-10-18 ENCOUNTER — OFFICE VISIT (OUTPATIENT)
Age: 83
End: 2023-10-18
Payer: MEDICARE

## 2023-10-18 DIAGNOSIS — F03.B11 MODERATE DEMENTIA WITH AGITATION, UNSPECIFIED DEMENTIA TYPE (HCC): Primary | ICD-10-CM

## 2023-10-18 PROCEDURE — 90791 PSYCH DIAGNOSTIC EVALUATION: CPT | Performed by: CLINICAL NEUROPSYCHOLOGIST

## 2023-10-18 PROCEDURE — 1036F TOBACCO NON-USER: CPT | Performed by: CLINICAL NEUROPSYCHOLOGIST

## 2023-10-18 PROCEDURE — 1123F ACP DISCUSS/DSCN MKR DOCD: CPT | Performed by: CLINICAL NEUROPSYCHOLOGIST

## 2023-10-18 PROCEDURE — 90785 PSYTX COMPLEX INTERACTIVE: CPT | Performed by: CLINICAL NEUROPSYCHOLOGIST

## 2023-10-18 NOTE — TELEPHONE ENCOUNTER
Contacted [employer] no PA is needed for [id number], [id number], [id number], [id number], [id number].     Transaction ID: [id number]

## 2023-11-02 ENCOUNTER — TELEPHONE (OUTPATIENT)
Age: 83
End: 2023-11-02

## 2023-11-02 NOTE — TELEPHONE ENCOUNTER
Pt's daughter, Moni Estrella, called to r/s Pt's appt for today at 1 pm. Pt is very sick and unable to walk.  Please call [phone]

## 2023-11-06 ENCOUNTER — TELEPHONE (OUTPATIENT)
Age: 83
End: 2023-11-06

## 2023-11-06 NOTE — TELEPHONE ENCOUNTER
Pt daughter calling to schedule testing. Please call her back directly as she is the one who schedules all of her parents appointments.      [phone]

## 2023-11-15 ENCOUNTER — TELEPHONE (OUTPATIENT)
Age: 83
End: 2023-11-15

## 2023-11-15 NOTE — TELEPHONE ENCOUNTER
Patients daughter Girs Guardado is returning a call for rescheduling testing.  She can be reached at [phone]

## 2023-11-20 ENCOUNTER — PROCEDURE VISIT (OUTPATIENT)
Age: 83
End: 2023-11-20
Payer: MEDICARE

## 2023-11-20 ENCOUNTER — TELEPHONE (OUTPATIENT)
Age: 83
End: 2023-11-20

## 2023-11-20 DIAGNOSIS — G30.9 MIXED ALZHEIMER'S AND VASCULAR DEMENTIA WITH BEHAVIOR DISTURBANCES (HCC): Primary | ICD-10-CM

## 2023-11-20 DIAGNOSIS — F02.818 MIXED ALZHEIMER'S AND VASCULAR DEMENTIA WITH BEHAVIOR DISTURBANCES (HCC): Primary | ICD-10-CM

## 2023-11-20 DIAGNOSIS — F01.518 MIXED ALZHEIMER'S AND VASCULAR DEMENTIA WITH BEHAVIOR DISTURBANCES (HCC): Primary | ICD-10-CM

## 2023-11-20 PROCEDURE — 96138 PSYCL/NRPSYC TECH 1ST: CPT | Performed by: CLINICAL NEUROPSYCHOLOGIST

## 2023-11-20 PROCEDURE — 96136 PSYCL/NRPSYC TST PHY/QHP 1ST: CPT | Performed by: CLINICAL NEUROPSYCHOLOGIST

## 2023-11-20 PROCEDURE — 96139 PSYCL/NRPSYC TST TECH EA: CPT | Performed by: CLINICAL NEUROPSYCHOLOGIST

## 2023-11-20 NOTE — TELEPHONE ENCOUNTER
Patients daughter Chandu Ojeda would like a call back to discuss if pt can have a vv on 12/4 or possibly changing to another date due to a conflict with another apptmt.  Please call daughter at [phone]

## 2023-12-04 NOTE — PROGRESS NOTES
Neuropsychological Evaluation Report      Patient Name: Aman Cabrera  YOB: 1940    Age: 79 y.o. Date of Intake: 10/18/2023   Education: 12 Date of Testin2023   Gender: Male Ethnicity: White     Referring Provider: Dr. Dontrell Barajas:  Aman Cabrera  was referred for neuropsychological evaluation by his Primary Care Physician to obtain a quantitative assessment of his current level of neurocognitive functioning, assist in differential diagnosis, and aid in individualized treatment planning. The patient understood the rationale and procedures for evaluation, as well as the limits to confidentiality, and they agreed to participate. The patient consented to have this report made available to his  treating providers through his  electronic medical records. This evaluation was completed with the patient by aPco Edwards PsyD with the exception of testing by technician, which was completed by [facility] under the supervision of Dr. Matthew Palomino. History Sources: Patient, Spouse, Relative (granddaughter), Medical Record, and Test Data    SUMMARY AND IMPRESSION:  The following section is a summary of the patient's pertinent test results and impressions. A more thorough review of the patient's background and test scores can be found below. Results from the patient's neuropsychological evaluation revealed moderate to advanced global cognitive impairment (2-3 standard deviations below the mean) suggestive of a moderate dementia. While the global profile of cognitive dysfunction is not specific to any etiology, I suspect a multifactorial process as the cause of his deficits with contributions from Alzheimer's disease and cerebrovascular disease. 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  During this appointment, the

## 2023-12-05 PROBLEM — F02.818 MIXED ALZHEIMER'S AND VASCULAR DEMENTIA WITH BEHAVIOR DISTURBANCES (HCC): Status: ACTIVE | Noted: 2023-12-05

## 2023-12-05 PROBLEM — G30.9 MIXED ALZHEIMER'S AND VASCULAR DEMENTIA WITH BEHAVIOR DISTURBANCES (HCC): Status: ACTIVE | Noted: 2023-12-05

## 2023-12-05 PROBLEM — F01.518 MIXED ALZHEIMER'S AND VASCULAR DEMENTIA WITH BEHAVIOR DISTURBANCES (HCC): Status: ACTIVE | Noted: 2023-12-05

## 2023-12-06 ENCOUNTER — PROCEDURE VISIT (OUTPATIENT)
Age: 83
End: 2023-12-06

## 2023-12-06 ENCOUNTER — OFFICE VISIT (OUTPATIENT)
Age: 83
End: 2023-12-06

## 2023-12-06 VITALS
WEIGHT: 169.8 LBS | BODY MASS INDEX: 21.79 KG/M2 | SYSTOLIC BLOOD PRESSURE: 132 MMHG | OXYGEN SATURATION: 95 % | HEIGHT: 74 IN | RESPIRATION RATE: 14 BRPM | HEART RATE: 60 BPM | DIASTOLIC BLOOD PRESSURE: 70 MMHG

## 2023-12-06 DIAGNOSIS — Z95.0 CARDIAC PACEMAKER IN SITU: Primary | ICD-10-CM

## 2023-12-06 DIAGNOSIS — I49.5 SICK SINUS SYNDROME (HCC): ICD-10-CM

## 2023-12-06 DIAGNOSIS — I10 PRIMARY HYPERTENSION: ICD-10-CM

## 2023-12-06 DIAGNOSIS — Z95.0 PACEMAKER: Primary | ICD-10-CM

## 2023-12-06 DIAGNOSIS — I48.91 ATRIAL FIBRILLATION, UNSPECIFIED TYPE (HCC): ICD-10-CM

## 2023-12-06 PROCEDURE — 3074F SYST BP LT 130 MM HG: CPT | Performed by: INTERNAL MEDICINE

## 2023-12-06 PROCEDURE — G8420 CALC BMI NORM PARAMETERS: HCPCS | Performed by: INTERNAL MEDICINE

## 2023-12-06 PROCEDURE — 3078F DIAST BP <80 MM HG: CPT | Performed by: INTERNAL MEDICINE

## 2023-12-06 PROCEDURE — 99214 OFFICE O/P EST MOD 30 MIN: CPT | Performed by: INTERNAL MEDICINE

## 2023-12-06 PROCEDURE — 1123F ACP DISCUSS/DSCN MKR DOCD: CPT | Performed by: INTERNAL MEDICINE

## 2023-12-06 PROCEDURE — 1036F TOBACCO NON-USER: CPT | Performed by: INTERNAL MEDICINE

## 2023-12-06 PROCEDURE — G8427 DOCREV CUR MEDS BY ELIG CLIN: HCPCS | Performed by: INTERNAL MEDICINE

## 2023-12-06 PROCEDURE — 93000 ELECTROCARDIOGRAM COMPLETE: CPT | Performed by: INTERNAL MEDICINE

## 2023-12-06 PROCEDURE — G8484 FLU IMMUNIZE NO ADMIN: HCPCS | Performed by: INTERNAL MEDICINE

## 2023-12-07 ENCOUNTER — TELEMEDICINE (OUTPATIENT)
Age: 83
End: 2023-12-07
Payer: MEDICARE

## 2023-12-07 DIAGNOSIS — F02.818 MIXED ALZHEIMER'S AND VASCULAR DEMENTIA WITH BEHAVIOR DISTURBANCES (HCC): Primary | ICD-10-CM

## 2023-12-07 DIAGNOSIS — G30.9 MIXED ALZHEIMER'S AND VASCULAR DEMENTIA WITH BEHAVIOR DISTURBANCES (HCC): Primary | ICD-10-CM

## 2023-12-07 DIAGNOSIS — F01.518 MIXED ALZHEIMER'S AND VASCULAR DEMENTIA WITH BEHAVIOR DISTURBANCES (HCC): Primary | ICD-10-CM

## 2023-12-07 PROCEDURE — 96132 NRPSYC TST EVAL PHYS/QHP 1ST: CPT | Performed by: CLINICAL NEUROPSYCHOLOGIST

## 2023-12-07 PROCEDURE — 96133 NRPSYC TST EVAL PHYS/QHP EA: CPT | Performed by: CLINICAL NEUROPSYCHOLOGIST

## 2023-12-07 NOTE — PROGRESS NOTES
Prior to seeing the patient I reviewed pertinent records, including the previously completed report, the records in [address] [place] [address], and any updated visits from other providers since the patient's last visit. Today, I engaged in a psychoeducational and supportive feedback session with the patient. I provided psychotherapy in the form of psychoeducation and support with respect to the results of the recent Neuropsychological Evaluation, including discussing individual tests as well as patient's areas of neurocognitive strength versus weakness. We discussed, in detail, the following:  Reviewed findings of evaluation with patient and his family including test scores, diagnosis, and suspected contributing factors  Discussed recommendations outlined in report  Answered questions to the best of my ability    Education was provided regarding my diagnostic impressions, and we discussed treatment plan/options. I also answered numerous questions related to the clinical findings, including discussing various methods to improve cognition and mood. Supportive/Cognitive Behavioral/Solution Focused psychotherapy provided. The patient needs to:    Follow-up with referring provider for ongoing management  Seek a higher level of care  Emphasize modifiable protective behaviors for cognitive functioning such as exercise, diet, and cognitive stimulation    TIME:  Clinical Interview: 1155 - 1215 = 20 minutes  Testing and scoring by provider: 16 minutes  Testing by technician: 1518 - 2056 = 147 minutes  Scoring by technician: 28 minutes  Neuropsychological testing evaluation services*: 153 minutes + 10 minutes feedback = 163 minutes total    BILLING:  13110 x 1 Unit  96136 x 1 Unit  96138 x 1 Unit  96139 x 5 Units  96132 x 1 Unit  96133 x 2 Units    *Neuropsychological testing evaluation services include: Integration of patient data, interpretation of standardized test results and clinical data, clinical decision-making, treatment

## 2023-12-21 ENCOUNTER — OFFICE VISIT (OUTPATIENT)
Age: 83
End: 2023-12-21
Payer: MEDICARE

## 2023-12-21 ENCOUNTER — HOSPITAL ENCOUNTER (OUTPATIENT)
Facility: HOSPITAL | Age: 83
Discharge: HOME | End: 2023-12-24
Payer: MEDICARE

## 2023-12-21 VITALS
TEMPERATURE: 97.3 F | HEIGHT: 74 IN | HEART RATE: 60 BPM | OXYGEN SATURATION: 98 % | WEIGHT: 167 LBS | BODY MASS INDEX: 21.43 KG/M2 | SYSTOLIC BLOOD PRESSURE: 118 MMHG | DIASTOLIC BLOOD PRESSURE: 71 MMHG | RESPIRATION RATE: 18 BRPM

## 2023-12-21 DIAGNOSIS — R05.3 CHRONIC COUGH: ICD-10-CM

## 2023-12-21 DIAGNOSIS — R05.3 CHRONIC COUGH: Primary | ICD-10-CM

## 2023-12-21 PROCEDURE — 3074F SYST BP LT 130 MM HG: CPT | Performed by: NURSE PRACTITIONER

## 2023-12-21 PROCEDURE — 71046 X-RAY EXAM CHEST 2 VIEWS: CPT

## 2023-12-21 PROCEDURE — 1123F ACP DISCUSS/DSCN MKR DOCD: CPT | Performed by: NURSE PRACTITIONER

## 2023-12-21 PROCEDURE — 1036F TOBACCO NON-USER: CPT | Performed by: NURSE PRACTITIONER

## 2023-12-21 PROCEDURE — 3078F DIAST BP <80 MM HG: CPT | Performed by: NURSE PRACTITIONER

## 2023-12-21 PROCEDURE — G8420 CALC BMI NORM PARAMETERS: HCPCS | Performed by: NURSE PRACTITIONER

## 2023-12-21 PROCEDURE — 99213 OFFICE O/P EST LOW 20 MIN: CPT | Performed by: NURSE PRACTITIONER

## 2023-12-21 PROCEDURE — G8484 FLU IMMUNIZE NO ADMIN: HCPCS | Performed by: NURSE PRACTITIONER

## 2023-12-21 PROCEDURE — G8427 DOCREV CUR MEDS BY ELIG CLIN: HCPCS | Performed by: NURSE PRACTITIONER

## 2023-12-21 RX ORDER — ASPIRIN 81 MG/1
81 TABLET ORAL DAILY
COMMUNITY

## 2023-12-21 NOTE — PROGRESS NOTES
Chief Complaint   Patient presents with    Cough     X 2 months: Copious Phlegm    Anticoagulation     Requesting alternative to Eliquis     1. Have you been to the ER, urgent care clinic since your last visit? Hospitalized since your last visit? No    2. Have you seen or consulted any other health care providers outside of the [address] [place] [address] Avenue since your last visit? Include any pap smears or colon screening.  Yes Where: Neurology

## 2023-12-26 RX ORDER — PANTOPRAZOLE SODIUM 20 MG/1
20 TABLET, DELAYED RELEASE ORAL DAILY
Qty: 90 TABLET | Refills: 3 | Status: SHIPPED | OUTPATIENT
Start: 2023-12-26

## 2024-01-01 ENCOUNTER — TELEPHONE (OUTPATIENT)
Age: 84
End: 2024-01-01

## 2024-01-23 ENCOUNTER — TELEPHONE (OUTPATIENT)
Age: 84
End: 2024-01-23

## 2024-01-23 NOTE — TELEPHONE ENCOUNTER
Called and spoke with daughter. She states that he went into the living room this morning and didn't know who his wife was. She states that he ws feeling very weak and fell to the floor. His wife called their son to help get him up and declined calling EMS. She states that she has concerns that the weakness could be related to anemia. I advised that per Pily, he may have a UTI and needs to go to the ED for work up. She advised that she also did have concerns of UTI but that they will likely not go to ED. She states that they can bring him for an apt but that it about it. I advised that given his sxs, we would likely send him to the ED. She states that she will discuss with her mother and brother and see what they decide.

## 2024-01-23 NOTE — TELEPHONE ENCOUNTER
Pily's Dilcia/patient's daughter concerned about patient being weak and urinating on himself. Dilcia's phone :[phone]

## 2024-02-19 RX ORDER — NEBIVOLOL 20 MG/1
1 TABLET ORAL DAILY
Qty: 90 TABLET | Refills: 1 | Status: SHIPPED | OUTPATIENT
Start: 2024-02-19

## 2024-02-23 ENCOUNTER — OFFICE VISIT (OUTPATIENT)
Age: 84
End: 2024-02-23
Payer: MEDICARE

## 2024-02-23 VITALS
HEIGHT: 70 IN | BODY MASS INDEX: 24.34 KG/M2 | HEART RATE: 60 BPM | OXYGEN SATURATION: 99 % | WEIGHT: 170 LBS | SYSTOLIC BLOOD PRESSURE: 142 MMHG | DIASTOLIC BLOOD PRESSURE: 82 MMHG

## 2024-02-23 DIAGNOSIS — I10 PRIMARY HYPERTENSION: Primary | ICD-10-CM

## 2024-02-23 DIAGNOSIS — Z79.01 ANTICOAGULATED: ICD-10-CM

## 2024-02-23 DIAGNOSIS — I49.5 SICK SINUS SYNDROME (HCC): ICD-10-CM

## 2024-02-23 DIAGNOSIS — Z95.0 PACEMAKER: ICD-10-CM

## 2024-02-23 DIAGNOSIS — I48.91 ATRIAL FIBRILLATION, UNSPECIFIED TYPE (HCC): ICD-10-CM

## 2024-02-23 PROCEDURE — G8427 DOCREV CUR MEDS BY ELIG CLIN: HCPCS | Performed by: SPECIALIST

## 2024-02-23 PROCEDURE — 3077F SYST BP >= 140 MM HG: CPT | Performed by: SPECIALIST

## 2024-02-23 PROCEDURE — 1036F TOBACCO NON-USER: CPT | Performed by: SPECIALIST

## 2024-02-23 PROCEDURE — 3079F DIAST BP 80-89 MM HG: CPT | Performed by: SPECIALIST

## 2024-02-23 PROCEDURE — G8484 FLU IMMUNIZE NO ADMIN: HCPCS | Performed by: SPECIALIST

## 2024-02-23 PROCEDURE — 1123F ACP DISCUSS/DSCN MKR DOCD: CPT | Performed by: SPECIALIST

## 2024-02-23 PROCEDURE — 99214 OFFICE O/P EST MOD 30 MIN: CPT | Performed by: SPECIALIST

## 2024-02-23 PROCEDURE — G8420 CALC BMI NORM PARAMETERS: HCPCS | Performed by: SPECIALIST

## 2024-02-23 NOTE — PROGRESS NOTES
Chief Complaint   Patient presents with    Hypertension    Atrial Fibrillation    Other     GLASS     Vitals:    24 1042   BP: (!) 142/82   Site: Left Upper Arm   Position: Sitting   Pulse: 60   SpO2: 99%   Weight: 77.1 kg (170 lb)   Height: 1.778 m (5' 10\")       Chest pain: DENIED     Recent hospital stays: DENIED     Refills: DENIED     NAME Last Ross         1940      MRN    [id number]      LAST OFFICE APPOINTMENT: 2023     DIAGNOSIS  No diagnosis found.    HOME MEDICATION  Current Outpatient Medications   Medication Sig    nebivolol (BYSTOLIC) 20 MG TABS tablet TAKE ONE TABLET BY MOUTH DAILY    pantoprazole (PROTONIX) 20 MG tablet Take 1 tablet by mouth daily    valsartan (DIOVAN) 160 MG tablet TAKE 1 TABLET BY MOUTH DAILY    aspirin 81 MG EC tablet Take 1 tablet by mouth daily    apixaban (ELIQUIS) 5 MG TABS tablet Take 1 tablet by mouth 2 times daily    potassium chloride (KLOR-CON M) 10 MEQ extended release tablet TAKE 1 TABLET BY MOUTH DAILY (Patient taking differently: Taking every other day)    furosemide (LASIX) 20 MG tablet TAKE ONE TABLET BY MOUTH EVERY MORNING (Patient taking differently: Take 1 tablet by mouth daily Taking every other day)    donepezil (ARICEPT) 10 MG tablet Take 1 tablet by mouth daily    vitamin D (CHOLECALCIFEROL) 25 MCG (1000 UT) TABS tablet Take 1 tablet by mouth daily    Lycopene 10 MG CAPS Take by mouth    Omega-3 Fatty Acids (FISH OIL) 1200 MG CAPS Take by mouth (Patient not taking: Reported on 2023)    cyanocobalamin 1000 MCG tablet Take 1 tablet by mouth daily (Patient not taking: Reported on 2023)     No current facility-administered medications for this visit.       VITAL SIGNS  Wt Readings from Last 3 Encounters:   24 77.1 kg (170 lb)   23 75.8 kg (167 lb)   23 77 kg (169 lb 12.8 oz)     BP Readings from Last 3 Encounters:   24 (!) 142/82   23 118/71   23 132/70     Pulse Readings from Last 3 
disturbance  Psychiatric: Denies confusion, insomnia, depression  Integumentray: Denies rash, itching or ulcers.  Hematologic: Denies easy bruising, bleeding     PHYSICAL EXAMINATION      Vitals:    02/23/24 1042   BP: (!) 142/82   Site: Left Upper Arm   Position: Sitting   Pulse: 60   SpO2: 99%   Weight: 77.1 kg (170 lb)   Height: 1.778 m (5' 10\")     General: Well developed, in no acute distress.  Chronically ill-appearing weak and deconditioned  HEENT: No jaundice  Neck: Supple, no stiffness  Heart:  Normal S1/S2 negative S3 or S4. Regular, no murmur, gallop or rub, no jugular venous distention  Respiratory: Clear but at his bases he has some dry crackles  Extremities:  No edema, normal cap refill, no cyanosis.  Musculoskeletal: No clubbing, no deformities  Neuro: A&Ox3, speech clear, cane for stability, cooperative, no focal neurologic deficits  Skin: Does have an excoriation over the right pretibial area with a layer of skin removed and underneath is red and beefy does not appear to be infected.         DIAGNOSTIC DATA     1. Pacer  12/11/2015- [employer] Pacemaker Generator Change and Lead Revision per Dr. Shukri Franks    2. Watchman  8/16/2018:Left atrial appendage occlusion utilizing WATCHMAN device per Dr. Shukri Franks    3. Stress Test  9/9/11-Stress Echo-normal, 10.1 mets, 8 min, EF 67%    4. Echo  9/9/11- EF 60%, RHYS, mild MR/TR/PI, RVSP 42 mmHg  8/17/18-EF 55 % to 60 %, RVE, RHYS, mild TR  6/14/23-EF 50 - 55%, Mild global hypokinesis,Trileaflet AV -Mild sclerosis of AV cusp, Mildly thickened MV leaflet, mild MR with a posterior directed jet, RVSP 33 mmHg, RHYS, Lead present in the right atrium.    5. Lipids  11/9/16- , HDL 62, , TG 69  10/6/22- , HDL 49, LDL 89, TG 76    6. 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    4. Carotid Duplex  8/30/23-1.  Bilateral subclavian arteries are patent.  2.  Bilateral common

## 2024-02-27 ENCOUNTER — TELEMEDICINE (OUTPATIENT)
Age: 84
End: 2024-02-27
Payer: MEDICARE

## 2024-02-27 DIAGNOSIS — E11.9 TYPE 2 DIABETES MELLITUS WITHOUT COMPLICATION, WITHOUT LONG-TERM CURRENT USE OF INSULIN (HCC): Primary | ICD-10-CM

## 2024-02-27 DIAGNOSIS — F50.89 PATHOLOGIC ICE EATING: ICD-10-CM

## 2024-02-27 DIAGNOSIS — J69.0 ASPIRATION PNEUMONIA, UNSPECIFIED ASPIRATION PNEUMONIA TYPE, UNSPECIFIED LATERALITY, UNSPECIFIED PART OF LUNG (HCC): ICD-10-CM

## 2024-02-27 DIAGNOSIS — T17.908A ASPIRATION INTO AIRWAY, INITIAL ENCOUNTER: ICD-10-CM
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
## 2024-02-27 PROCEDURE — G8427 DOCREV CUR MEDS BY ELIG CLIN: HCPCS | Performed by: FAMILY MEDICINE

## 2024-02-27 PROCEDURE — G8420 CALC BMI NORM PARAMETERS: HCPCS | Performed by: FAMILY MEDICINE

## 2024-02-27 PROCEDURE — G8484 FLU IMMUNIZE NO ADMIN: HCPCS | Performed by: FAMILY MEDICINE

## 2024-02-27 PROCEDURE — 99214 OFFICE O/P EST MOD 30 MIN: CPT | Performed by: FAMILY MEDICINE

## 2024-02-27 PROCEDURE — 1036F TOBACCO NON-USER: CPT | Performed by: FAMILY MEDICINE

## 2024-02-27 PROCEDURE — 1123F ACP DISCUSS/DSCN MKR DOCD: CPT | Performed by: FAMILY MEDICINE

## 2024-02-27 ASSESSMENT — PATIENT HEALTH QUESTIONNAIRE - PHQ9
1. LITTLE INTEREST OR PLEASURE IN DOING THINGS: 0
SUM OF ALL RESPONSES TO PHQ QUESTIONS 1-9: 0
2. FEELING DOWN, DEPRESSED OR HOPELESS: 0
SUM OF ALL RESPONSES TO PHQ9 QUESTIONS 1 & 2: 0

## 2024-02-27 NOTE — PATIENT INSTRUCTIONS

## 2024-02-27 NOTE — PROGRESS NOTES
Progress Note    he is a 83 y.o. year old male who presents for evaluation.    Subjective:     Patient here for follow-up after his cardiologist had sent me a message regarding concerns of aspiration.  Having increased phlegm production and coughing while eating.  There have been no specific choking incidents per se.  He is also throwing up when he eats.  Cardiology had given them the number for GI as well which I recommend he make an appointment with sensible likely due to with weight.      They report the patient has been chewing a lot of ice or concern about possible anemia.  Patient also with diabetes due for recheck this has been very well-controlled.  Reviewed PmHx, RxHx, FmHx, SocHx, AllgHx and updated and dated in the chart.    Review of Systems - negative except as listed above in the HPI    Objective:   There were no vitals filed for this visit.    Current Outpatient Medications   Medication Sig    nebivolol (BYSTOLIC) 20 MG TABS tablet TAKE ONE TABLET BY MOUTH DAILY    pantoprazole (PROTONIX) 20 MG tablet Take 1 tablet by mouth daily    valsartan (DIOVAN) 160 MG tablet TAKE 1 TABLET BY MOUTH DAILY    aspirin 81 MG EC tablet Take 1 tablet by mouth daily    apixaban (ELIQUIS) 5 MG TABS tablet Take 1 tablet by mouth 2 times daily    potassium chloride (KLOR-CON M) 10 MEQ extended release tablet TAKE 1 TABLET BY MOUTH DAILY (Patient taking differently: Taking every other day)    furosemide (LASIX) 20 MG tablet TAKE ONE TABLET BY MOUTH EVERY MORNING (Patient taking differently: Take 1 tablet by mouth daily Taking every other day)    donepezil (ARICEPT) 10 MG tablet Take 1 tablet by mouth daily    vitamin D (CHOLECALCIFEROL) 25 MCG (1000 UT) TABS tablet Take 1 tablet by mouth daily    Lycopene 10 MG CAPS Take by mouth    Omega-3 Fatty Acids (FISH OIL) 1200 MG CAPS Take by mouth (Patient not taking: Reported on 12/21/2023)    cyanocobalamin 1000 MCG tablet Take 1 tablet by mouth daily (Patient not taking:

## 2024-02-29 DIAGNOSIS — E11.9 TYPE 2 DIABETES MELLITUS WITHOUT COMPLICATION, WITHOUT LONG-TERM CURRENT USE OF INSULIN (HCC): ICD-10-CM

## 2024-02-29 DIAGNOSIS — F50.89 PATHOLOGIC ICE EATING: ICD-10-CM

## 2024-03-01 LAB
ERYTHROCYTE [DISTWIDTH] IN BLOOD BY AUTOMATED COUNT: 13.6 % (ref 11.6–15.4)
HBA1C MFR BLD: 6.2 % (ref 4.8–5.6)
HCT VFR BLD AUTO: 36.6 % (ref 37.5–51)
HGB BLD-MCNC: 11.9 G/DL (ref 13–17.7)
MCH RBC QN AUTO: 30 PG (ref 26.6–33)
MCHC RBC AUTO-ENTMCNC: 32.5 G/DL (ref 31.5–35.7)
MCV RBC AUTO: 92 FL (ref 79–97)
PLATELET # BLD AUTO: 221 X10E3/UL (ref 150–450)
RBC # BLD AUTO: 3.97 X10E6/UL (ref 4.14–5.8)
WBC # BLD AUTO: 6.1 X10E3/UL (ref 3.4–10.8)

## 2024-03-06 ENCOUNTER — HOSPITAL ENCOUNTER (OUTPATIENT)
Facility: HOSPITAL | Age: 84
Discharge: HOME | End: 2024-03-09
Attending: FAMILY MEDICINE
Payer: MEDICARE

## 2024-03-06 DIAGNOSIS — J69.0 ASPIRATION PNEUMONIA, UNSPECIFIED ASPIRATION PNEUMONIA TYPE, UNSPECIFIED LATERALITY, UNSPECIFIED PART OF LUNG (HCC): ICD-10-CM

## 2024-03-06 DIAGNOSIS — T17.908A ASPIRATION INTO AIRWAY, INITIAL ENCOUNTER: ICD-10-CM
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
## 2024-03-06 PROCEDURE — 92611 MOTION FLUOROSCOPY/SWALLOW: CPT

## 2024-03-06 PROCEDURE — 74230 X-RAY XM SWLNG FUNCJ C+: CPT

## 2024-03-06 NOTE — PROGRESS NOTES
[facility] [place]  SPEECH PATHOLOGY MODIFIED BARIUM SWALLOW STUDY  Patient: Last Ross (83 y.o. male)  Date: 3/6/2024  Referring Provider:  GIA Doyle    SUBJECTIVE:   Patient and family report coughing at meals, but more overwhelming, he regurgitates a moderate amount of material 5-10 min after each meal-up to 6 cups a day.    This has occurred for several months. He also coughs up material at night.  ?reflux  No recent PNA.    Wife reports he was recently started on multi-vitamin, vitamin C and iron pills.      OBJECTIVE:   Past Medical History:   Past Medical History:   Diagnosis Date    A-fib (HCC)     discovered 2008    Arthritis     Knees    Hypertension     Pacemaker     placed  Nov 2011 ("[employer]")    Sick sinus syndrome ([facility])     heart monitor showed long pauses (> 4 sec) in 2011     Past Surgical History:   Procedure Laterality Date    HERNIA REPAIR      PACEMAKER       Current Dietary Status:  regular, thins  Type of Study: Modified barium swallow  Film Views: Lateral  Radiologist: Ganesh  Patient Position: upright in Hausted chair      Trial 1: Trial 2:   Consistencies Administered:  (thins, purees, jv cracker, barium pill)     How Presented: Spoon;Self-fed/presented;SLP-fed/Presented;Straw;Successive Swallows;Cup/gulp     Bolus Acceptance: Impaired (tended to take large sips, especially with pill)  ORAL PHASE:      Bolus Formation/Control: No impairment       Propulsion: No impairment       Oral Residue: None      PHARYNGEAL PHASE:      Timing: Pooling 1-5 sec     Penetration:  (frequent transient penetration with thins that cleared with force of the swallow)     Aspiration/Timing:  (patient aspirated thins when he drank a large sip with pill in the mouth); aspiration was silent. He had a delayed cough that was too late to clear.     Pharyngeal Clearance: Vallecular residue;Pyriform residue;Less than 10% (with solids. some reduced awareness)     Attempted

## 2024-03-11 DIAGNOSIS — T17.908A ASPIRATION INTO AIRWAY, INITIAL ENCOUNTER: Primary | ICD-10-CM

## 2024-06-12 DIAGNOSIS — R11.10 REGURGITATION OF FOOD: Primary | ICD-10-CM

## 2024-06-20 ENCOUNTER — OFFICE VISIT (OUTPATIENT)
Age: 84
End: 2024-06-20
Payer: MEDICARE

## 2024-06-20 VITALS
DIASTOLIC BLOOD PRESSURE: 79 MMHG | RESPIRATION RATE: 16 BRPM | OXYGEN SATURATION: 92 % | HEIGHT: 74 IN | SYSTOLIC BLOOD PRESSURE: 146 MMHG | HEART RATE: 59 BPM | BODY MASS INDEX: 23.23 KG/M2 | TEMPERATURE: 98.1 F | WEIGHT: 181 LBS

## 2024-06-20 DIAGNOSIS — R64 CACHEXIA (HCC): ICD-10-CM

## 2024-06-20 DIAGNOSIS — R11.10 VOMITING, UNSPECIFIED VOMITING TYPE, UNSPECIFIED WHETHER NAUSEA PRESENT: ICD-10-CM

## 2024-06-20 DIAGNOSIS — G30.9 MIXED ALZHEIMER'S AND VASCULAR DEMENTIA WITH BEHAVIOR DISTURBANCES (HCC): ICD-10-CM

## 2024-06-20 DIAGNOSIS — D64.9 ANEMIA, UNSPECIFIED TYPE: ICD-10-CM

## 2024-06-20 DIAGNOSIS — I10 ESSENTIAL HYPERTENSION: ICD-10-CM

## 2024-06-20 DIAGNOSIS — R13.10 DYSPHAGIA, UNSPECIFIED TYPE: Primary | ICD-10-CM

## 2024-06-20 DIAGNOSIS — F02.818 MIXED ALZHEIMER'S AND VASCULAR DEMENTIA WITH BEHAVIOR DISTURBANCES (HCC): ICD-10-CM

## 2024-06-20 DIAGNOSIS — R73.03 PREDIABETES: ICD-10-CM

## 2024-06-20 DIAGNOSIS — F01.518 MIXED ALZHEIMER'S AND VASCULAR DEMENTIA WITH BEHAVIOR DISTURBANCES (HCC): ICD-10-CM

## 2024-06-20 DIAGNOSIS — R13.10 DYSPHAGIA, UNSPECIFIED TYPE: ICD-10-CM

## 2024-06-20 PROCEDURE — 3078F DIAST BP <80 MM HG: CPT | Performed by: FAMILY MEDICINE

## 2024-06-20 PROCEDURE — 3077F SYST BP >= 140 MM HG: CPT | Performed by: FAMILY MEDICINE

## 2024-06-20 PROCEDURE — 1123F ACP DISCUSS/DSCN MKR DOCD: CPT | Performed by: FAMILY MEDICINE

## 2024-06-20 PROCEDURE — 99214 OFFICE O/P EST MOD 30 MIN: CPT | Performed by: FAMILY MEDICINE

## 2024-06-20 PROCEDURE — G8427 DOCREV CUR MEDS BY ELIG CLIN: HCPCS | Performed by: FAMILY MEDICINE

## 2024-06-20 PROCEDURE — G8420 CALC BMI NORM PARAMETERS: HCPCS | Performed by: FAMILY MEDICINE

## 2024-06-20 PROCEDURE — 1036F TOBACCO NON-USER: CPT | Performed by: FAMILY MEDICINE

## 2024-06-20 SDOH — ECONOMIC STABILITY: FOOD INSECURITY: WITHIN THE PAST 12 MONTHS, YOU WORRIED THAT YOUR FOOD WOULD RUN OUT BEFORE YOU GOT MONEY TO BUY MORE.: NEVER TRUE

## 2024-06-20 SDOH — ECONOMIC STABILITY: FOOD INSECURITY: WITHIN THE PAST 12 MONTHS, THE FOOD YOU BOUGHT JUST DIDN'T LAST AND YOU DIDN'T HAVE MONEY TO GET MORE.: NEVER TRUE

## 2024-06-20 SDOH — ECONOMIC STABILITY: INCOME INSECURITY: HOW HARD IS IT FOR YOU TO PAY FOR THE VERY BASICS LIKE FOOD, HOUSING, MEDICAL CARE, AND HEATING?: NOT HARD AT ALL

## 2024-06-20 NOTE — PROGRESS NOTES
Chief Complaint   Patient presents with    Follow-up     Labs       Patient presents in office today for f/u and labs.  Has labs from GI that he would like to see if you can order.  Has c/o feeling shaky.  Also has c/o swelling.  No other concerns.      \"Have you been to the ER, urgent care clinic since your last visit?  Hospitalized since your last visit?\"    NO    “Have you seen or consulted any other health care providers outside of [facility] since your last visit?”    NO            Click Here for Release of Records Request

## 2024-06-20 NOTE — TELEPHONE ENCOUNTER
Daughter ( Dilcia) wants you to read the [employer] message please before pt comes in You do not have to call her back

## 2024-06-20 NOTE — PROGRESS NOTES
Progress Note    he is a 84 y.o. year old male who presents for evaluation.    Subjective:     Here for f/up, mild anemia last check at 11.9, normocytic.  Has been regurgitating food seeing GI plan for EGD.  Will reorder labs from GI to save a trip and needlestick.  Alzheimer dementia, lives with wife, daughter participates and messages me from [place].  He is actually up 14 lbs but wife says he gains then loses.  Craves ice.  Prediabetes at 6.2, not too concerned for age.  Memory is unfortunately continuing to decline per wife.      BP at goal for age.    Hemoglobin A1C   Date Value Ref Range Status   02/27/2024 6.2 (H) 4.8 - 5.6 % Final     Comment:                 Prediabetes: 5.7 - 6.4           Diabetes: >6.4           Glycemic control for adults with diabetes: <7.0           Reviewed PmHx, RxHx, FmHx, SocHx, AllgHx and updated and dated in the chart.    Review of Systems - negative except as listed above in the HPI    Objective:     Vitals:    06/20/24 1551   BP: (!) 146/79   Site: Right Upper Arm   Position: Sitting   Pulse: 59   Resp: 16   Temp: 98.1 °F (36.7 °C)   TempSrc: Oral   SpO2: 92%   Weight: 82.1 kg (181 lb)   Height: 1.88 m (6' 2\")       Current Outpatient Medications   Medication Sig    Multiple Vitamin (MULTI VITAMIN DAILY PO) Take by mouth    nebivolol (BYSTOLIC) 20 MG TABS tablet TAKE ONE TABLET BY MOUTH DAILY    valsartan (DIOVAN) 160 MG tablet TAKE 1 TABLET BY MOUTH DAILY    aspirin 81 MG EC tablet Take 1 tablet by mouth daily    apixaban (ELIQUIS) 5 MG TABS tablet Take 1 tablet by mouth 2 times daily    potassium chloride (KLOR-CON M) 10 MEQ extended release tablet TAKE 1 TABLET BY MOUTH DAILY (Patient taking differently: Taking every other day)    furosemide (LASIX) 20 MG tablet TAKE ONE TABLET BY MOUTH EVERY MORNING (Patient taking differently: Take 1 tablet by mouth daily Taking every other day)    donepezil (ARICEPT) 10 MG tablet Take 1 tablet by mouth daily    Lycopene 10 MG CAPS Take

## 2024-06-20 NOTE — PATIENT INSTRUCTIONS

## 2024-06-21 LAB
ALBUMIN SERPL-MCNC: 3.4 G/DL (ref 3.5–5)
ALBUMIN/GLOB SERPL: 1 (ref 1.1–2.2)
ALP SERPL-CCNC: 89 U/L (ref 45–117)
ALT SERPL-CCNC: 18 U/L (ref 12–78)
ANION GAP SERPL CALC-SCNC: 4 MMOL/L (ref 5–15)
AST SERPL-CCNC: 20 U/L (ref 15–37)
BILIRUB SERPL-MCNC: 1.6 MG/DL (ref 0.2–1)
BUN SERPL-MCNC: 30 MG/DL (ref 6–20)
BUN/CREAT SERPL: 25 (ref 12–20)
CALCIUM SERPL-MCNC: 9.1 MG/DL (ref 8.5–10.1)
CHLORIDE SERPL-SCNC: 107 MMOL/L (ref 97–108)
CO2 SERPL-SCNC: 27 MMOL/L (ref 21–32)
CREAT SERPL-MCNC: 1.21 MG/DL (ref 0.7–1.3)
ERYTHROCYTE [DISTWIDTH] IN BLOOD BY AUTOMATED COUNT: 14.7 % (ref 11.5–14.5)
EST. AVERAGE GLUCOSE BLD GHB EST-MCNC: 128 MG/DL
FERRITIN SERPL-MCNC: 74 NG/ML (ref 26–388)
GLOBULIN SER CALC-MCNC: 3.5 G/DL (ref 2–4)
GLUCOSE SERPL-MCNC: 140 MG/DL (ref 65–100)
HBA1C MFR BLD: 6.1 % (ref 4–5.6)
HCT VFR BLD AUTO: 35.2 % (ref 36.6–50.3)
HGB BLD-MCNC: 11.5 G/DL (ref 12.1–17)
IRON SATN MFR SERPL: 15 % (ref 20–50)
IRON SERPL-MCNC: 39 UG/DL (ref 35–150)
MAGNESIUM SERPL-MCNC: 2.1 MG/DL (ref 1.6–2.4)
MCH RBC QN AUTO: 31 PG (ref 26–34)
MCHC RBC AUTO-ENTMCNC: 32.7 G/DL (ref 30–36.5)
MCV RBC AUTO: 94.9 FL (ref 80–99)
NRBC # BLD: 0 K/UL (ref 0–0.01)
NRBC BLD-RTO: 0 PER 100 WBC
PLATELET # BLD AUTO: 213 K/UL (ref 150–400)
PMV BLD AUTO: 10.8 FL (ref 8.9–12.9)
POTASSIUM SERPL-SCNC: 4.1 MMOL/L (ref 3.5–5.1)
PROT SERPL-MCNC: 6.9 G/DL (ref 6.4–8.2)
RBC # BLD AUTO: 3.71 M/UL (ref 4.1–5.7)
SODIUM SERPL-SCNC: 138 MMOL/L (ref 136–145)
TIBC SERPL-MCNC: 264 UG/DL (ref 250–450)
TSH SERPL DL<=0.05 MIU/L-ACNC: 0.95 UIU/ML (ref 0.36–3.74)
WBC # BLD AUTO: 12.5 K/UL (ref 4.1–11.1)

## 2024-06-25 ENCOUNTER — TELEPHONE (OUTPATIENT)
Age: 84
End: 2024-06-25

## 2024-06-25 NOTE — TELEPHONE ENCOUNTER
Pt needs cardiac clearance and Eliquis/ASA 81 mg instructions for EGD 6/28/24. Last seen in office 2/2024 dx Watchman, pacemaker, afib and HTN. Echo done 6/23 EF 50-55%

## 2024-06-26 ENCOUNTER — TELEPHONE (OUTPATIENT)
Age: 84
End: 2024-06-26

## 2024-06-28 RX ORDER — VALSARTAN 160 MG/1
160 TABLET ORAL DAILY
Qty: 90 TABLET | Refills: 1 | Status: SHIPPED | OUTPATIENT
Start: 2024-06-28

## 2024-07-08 ENCOUNTER — TELEPHONE (OUTPATIENT)
Age: 84
End: 2024-07-08

## 2024-07-08 ENCOUNTER — PATIENT MESSAGE (OUTPATIENT)
Age: 84
End: 2024-07-08

## 2024-07-08 NOTE — PROGRESS NOTES
Advised by scheduling staff that they were contacted by patient's daughter (Kary) today who informed them the patient passed away on 7/6/2024.  Marked chart accordingly.

## 2024-07-08 NOTE — TELEPHONE ENCOUNTER
From: Last Ross  To: Pily Oreilly  Sent: 7/8/2024 9:30 AM EDT  Subject: Care    Please be advised tash passed away sat, July 6th. Thank you for all your care thru the years. Please note in his chart.

## 2024-07-21 PROCEDURE — 93294 REM INTERROG EVL PM/LDLS PM: CPT | Performed by: INTERNAL MEDICINE
